# Patient Record
Sex: FEMALE | Race: WHITE | Employment: OTHER | URBAN - METROPOLITAN AREA
[De-identification: names, ages, dates, MRNs, and addresses within clinical notes are randomized per-mention and may not be internally consistent; named-entity substitution may affect disease eponyms.]

---

## 2018-10-20 ENCOUNTER — APPOINTMENT (OUTPATIENT)
Dept: GENERAL RADIOLOGY | Age: 73
End: 2018-10-20
Payer: MEDICARE

## 2018-10-20 ENCOUNTER — HOSPITAL ENCOUNTER (EMERGENCY)
Age: 73
Discharge: HOME | End: 2018-10-20
Attending: EMERGENCY MEDICINE
Payer: MEDICARE

## 2018-10-20 VITALS
OXYGEN SATURATION: 96 % | WEIGHT: 120 LBS | TEMPERATURE: 98 F | BODY MASS INDEX: 18.19 KG/M2 | HEIGHT: 68 IN | RESPIRATION RATE: 16 BRPM | SYSTOLIC BLOOD PRESSURE: 148 MMHG | HEART RATE: 45 BPM | DIASTOLIC BLOOD PRESSURE: 54 MMHG

## 2018-10-20 DIAGNOSIS — M54.10 RADICULAR LOW BACK PAIN: ICD-10-CM

## 2018-10-20 DIAGNOSIS — M25.552 LEFT HIP PAIN: Primary | ICD-10-CM

## 2018-10-20 PROCEDURE — 73502 X-RAY EXAM HIP UNI 2-3 VIEWS: CPT

## 2018-10-20 PROCEDURE — 6370000000 HC RX 637 (ALT 250 FOR IP): Performed by: EMERGENCY MEDICINE

## 2018-10-20 PROCEDURE — 72100 X-RAY EXAM L-S SPINE 2/3 VWS: CPT

## 2018-10-20 PROCEDURE — 99283 EMERGENCY DEPT VISIT LOW MDM: CPT

## 2018-10-20 PROCEDURE — 96372 THER/PROPH/DIAG INJ SC/IM: CPT

## 2018-10-20 PROCEDURE — 6360000002 HC RX W HCPCS: Performed by: EMERGENCY MEDICINE

## 2018-10-20 RX ORDER — OXYCODONE AND ACETAMINOPHEN 10; 325 MG/1; MG/1
1 TABLET ORAL EVERY 8 HOURS PRN
Qty: 18 TABLET | Refills: 0 | Status: SHIPPED | OUTPATIENT
Start: 2018-10-20 | End: 2018-10-30

## 2018-10-20 RX ORDER — OXYCODONE AND ACETAMINOPHEN 10; 325 MG/1; MG/1
1 TABLET ORAL ONCE
Status: COMPLETED | OUTPATIENT
Start: 2018-10-20 | End: 2018-10-20

## 2018-10-20 RX ORDER — DEXAMETHASONE SODIUM PHOSPHATE 10 MG/ML
10 INJECTION, SOLUTION INTRAMUSCULAR; INTRAVENOUS ONCE
Status: COMPLETED | OUTPATIENT
Start: 2018-10-20 | End: 2018-10-20

## 2018-10-20 RX ORDER — CYCLOBENZAPRINE HCL 10 MG
10 TABLET ORAL 3 TIMES DAILY PRN
Qty: 20 TABLET | Refills: 0 | Status: SHIPPED | OUTPATIENT
Start: 2018-10-20 | End: 2018-10-30

## 2018-10-20 RX ORDER — ORPHENADRINE CITRATE 30 MG/ML
60 INJECTION INTRAMUSCULAR; INTRAVENOUS ONCE
Status: COMPLETED | OUTPATIENT
Start: 2018-10-20 | End: 2018-10-20

## 2018-10-20 RX ORDER — PREDNISONE 20 MG/1
TABLET ORAL
Qty: 10 TABLET | Refills: 0 | Status: SHIPPED | OUTPATIENT
Start: 2018-10-20 | End: 2018-10-30

## 2018-10-20 RX ADMIN — ORPHENADRINE CITRATE 60 MG: 30 INJECTION INTRAMUSCULAR; INTRAVENOUS at 07:56

## 2018-10-20 RX ADMIN — DEXAMETHASONE SODIUM PHOSPHATE 10 MG: 10 INJECTION INTRAMUSCULAR; INTRAVENOUS at 07:56

## 2018-10-20 RX ADMIN — OXYCODONE HYDROCHLORIDE AND ACETAMINOPHEN 1 TABLET: 10; 325 TABLET ORAL at 09:45

## 2018-10-20 RX ADMIN — HYDROMORPHONE HYDROCHLORIDE 1 MG: 1 INJECTION, SOLUTION INTRAMUSCULAR; INTRAVENOUS; SUBCUTANEOUS at 07:56

## 2018-10-20 RX ADMIN — OXYCODONE HYDROCHLORIDE AND ACETAMINOPHEN 1 TABLET: 10; 325 TABLET ORAL at 11:24

## 2018-10-20 ASSESSMENT — PAIN DESCRIPTION - ORIENTATION
ORIENTATION: LEFT
ORIENTATION: LEFT

## 2018-10-20 ASSESSMENT — PAIN SCALES - GENERAL
PAINLEVEL_OUTOF10: 10
PAINLEVEL_OUTOF10: 4
PAINLEVEL_OUTOF10: 9
PAINLEVEL_OUTOF10: 4
PAINLEVEL_OUTOF10: 10

## 2018-10-20 ASSESSMENT — ENCOUNTER SYMPTOMS
VOMITING: 0
DIARRHEA: 0
COUGH: 0
EYE REDNESS: 0
EYE DISCHARGE: 0
ABDOMINAL DISTENTION: 0
SORE THROAT: 0
SHORTNESS OF BREATH: 0
SINUS PRESSURE: 0
BACK PAIN: 0
EYE PAIN: 0
WHEEZING: 0
NAUSEA: 0

## 2018-10-20 ASSESSMENT — PAIN DESCRIPTION - LOCATION
LOCATION: HIP
LOCATION: HIP

## 2018-10-20 ASSESSMENT — PAIN DESCRIPTION - PAIN TYPE: TYPE: CHRONIC PAIN

## 2018-10-20 NOTE — ED NOTES
Discharge instructions given, medications and follow up instructions reviewed. Patient verbalized understanding, no other noted or stated problems at this time. Patient will follow up with physicians as directed.       Martina Steel RN  10/20/18 [id number]

## 2018-10-20 NOTE — ED PROVIDER NOTES
Patient with long history of sciatic-type symptoms. The last couple weeks, she's had more frequent symptoms on the left but, she states that she is normally able to [facility] it off\" it is not that bad. This morning, she is awake and early morning with severe left-sided symptoms. No recent trauma. The history is provided by the patient and the spouse. Leg Injury   Location:  Hip  Time since incident:  2 weeks  Injury: no    Hip location:  L hip  Pain details:     Quality:  Shooting    Radiates to: Let flank. Severity:  Severe    Onset quality:  Gradual    Duration:  2 weeks    Timing:  Constant    Progression:  Waxing and waning  Chronicity:  Chronic  Dislocation: no    Relieved by: position. Exacerbated by: movement. Ineffective treatments:  None tried  Associated symptoms: no back pain and no fever        Review of Systems   Constitutional: Negative for chills and fever. HENT: Negative for ear pain, sinus pressure and sore throat. Eyes: Negative for pain, discharge and redness. Respiratory: Negative for cough, shortness of breath and wheezing. Cardiovascular: Negative for chest pain. Gastrointestinal: Negative for abdominal distention, diarrhea, nausea and vomiting. Genitourinary: Negative for dysuria and frequency. Musculoskeletal: Positive for arthralgias. Negative for back pain. Skin: Negative for rash and wound. Neurological: Negative for weakness and headaches. Hematological: Negative for adenopathy. All other systems reviewed and are negative. Physical Exam   Constitutional: She is oriented to person, place, and time. She appears well-developed and well-nourished. HENT:   Head: Normocephalic and atraumatic. Eyes: Pupils are equal, round, and reactive to light. Neck: Normal range of motion. Neck supple. Cardiovascular: Normal rate, regular rhythm and normal heart sounds. No murmur heard.   Pulmonary/Chest: Effort normal and breath sounds normal. No respiratory allergies. -------------------------------------------------- RESULTS -------------------------------------------------  Labs:  No results found for this visit on 10/20/18. Radiology:  XR HIP LEFT (2-3 VIEWS)   Final Result   No acute osseous abnormality. XR LUMBAR SPINE (2-3 VIEWS)   Final Result   Nonspecific straightening of the lumbar lordosis with mild lumbar   spondylosis          ------------------------- NURSING NOTES AND VITALS REVIEWED ---------------------------  Date / Time Roomed:  10/20/2018  7:20 AM  ED Bed Assignment:  13/13    The nursing notes within the ED encounter and vital signs as below have been reviewed. BP (!) 148/54   Pulse (!) 45   Temp 98 °F (36.7 °C) (Oral)   Resp 16   Ht 5' 8\" (1.727 m)   Wt 120 lb (54.4 kg)   SpO2 96%   BMI 18.25 kg/m²   Oxygen Saturation Interpretation: Normal      ------------------------------------------ PROGRESS NOTES ------------------------------------------  11:22 AM  I have spoken with the patient and discussed todays results, in addition to providing specific details for the plan of care and counseling regarding the diagnosis and prognosis. Their questions are answered at this time and they are agreeable with the plan. I discussed at length with them reasons for immediate return here for re evaluation. They will followup with their primary care physician by calling their office on Monday.      --------------------------------- ADDITIONAL PROVIDER NOTES ---------------------------------  At this time the patient is without objective evidence of an acute process requiring hospitalization or inpatient management. They have remained hemodynamically stable throughout their entire ED visit and are stable for discharge with outpatient follow-up. The plan has been discussed in detail and they are aware of the specific conditions for emergent return, as well as the importance of follow-up.       New Prescriptions    CYCLOBENZAPRINE

## 2019-12-18 ENCOUNTER — HOSPITAL ENCOUNTER (OUTPATIENT)
Dept: GENERAL RADIOLOGY | Age: 74
Discharge: HOME | End: 2019-12-20
Payer: MEDICARE

## 2019-12-18 DIAGNOSIS — Z12.31 VISIT FOR SCREENING MAMMOGRAM: ICD-10-CM

## 2019-12-18 PROCEDURE — 77063 BREAST TOMOSYNTHESIS BI: CPT

## 2021-02-01 ENCOUNTER — IMMUNIZATION (OUTPATIENT)
Dept: PRIMARY CARE CLINIC | Age: 76
End: 2021-02-01
Payer: MEDICARE

## 2021-02-01 DIAGNOSIS — Z23 NEED FOR VACCINATION: Primary | ICD-10-CM

## 2021-02-01 PROCEDURE — 0001A COVID-19, PFIZER VACCINE 30MCG/0.3ML DOSE: CPT | Performed by: PHYSICIAN ASSISTANT

## 2021-02-01 PROCEDURE — 91300 COVID-19, PFIZER VACCINE 30MCG/0.3ML DOSE: CPT | Performed by: PHYSICIAN ASSISTANT

## 2021-02-22 ENCOUNTER — IMMUNIZATION (OUTPATIENT)
Dept: PRIMARY CARE CLINIC | Age: 76
End: 2021-02-22
Payer: MEDICARE

## 2021-02-22 PROCEDURE — 0002A COVID-19, PFIZER VACCINE 30MCG/0.3ML DOSE: CPT | Performed by: NURSE PRACTITIONER

## 2021-02-22 PROCEDURE — 91300 COVID-19, PFIZER VACCINE 30MCG/0.3ML DOSE: CPT | Performed by: NURSE PRACTITIONER

## 2021-09-27 ENCOUNTER — IMMUNIZATION (OUTPATIENT)
Dept: PRIMARY CARE CLINIC | Age: 76
End: 2021-09-27
Payer: MEDICARE

## 2021-09-27 PROCEDURE — 0003A COVID-19, PFIZER VACCINE 30MCG/0.3ML DOSE: CPT | Performed by: FAMILY MEDICINE

## 2021-09-27 PROCEDURE — 91300 COVID-19, PFIZER VACCINE 30MCG/0.3ML DOSE: CPT | Performed by: FAMILY MEDICINE

## 2022-01-31 ENCOUNTER — HOSPITAL ENCOUNTER (EMERGENCY)
Age: 77
Discharge: HOME | End: 2022-01-31
Payer: COMMERCIAL

## 2022-01-31 VITALS
SYSTOLIC BLOOD PRESSURE: 109 MMHG | OXYGEN SATURATION: 95 % | TEMPERATURE: 98.9 F | HEIGHT: 68 IN | HEART RATE: 109 BPM | BODY MASS INDEX: 18.94 KG/M2 | RESPIRATION RATE: 20 BRPM | WEIGHT: 125 LBS | DIASTOLIC BLOOD PRESSURE: 67 MMHG

## 2022-01-31 DIAGNOSIS — B34.9 VIRAL SYNDROME: Primary | ICD-10-CM

## 2022-01-31 LAB — SARS-COV-2, NAAT: NOT DETECTED

## 2022-01-31 PROCEDURE — 87635 SARS-COV-2 COVID-19 AMP PRB: CPT

## 2022-01-31 PROCEDURE — 99212 OFFICE O/P EST SF 10 MIN: CPT

## 2022-01-31 NOTE — ED PROVIDER NOTES
[facility]  Department of Emergency Medicine  UC Encounter Note  22   9:22 AM EST      NAME: Tez Barros  :  1945  MRN:  [id number]    Chief Complaint: Headache (started today with chills achey  tired), Fatigue, and Concern For COVID-19      This is a 65-year-old female the presents to urgent care complaining of some body aches chills achy feels tired fatigue and some mild URI symptoms and a headache since last night. She did take some over-the-counter medications and the headache is gone away. She denies any chest pain or shortness of breath. At this time. Has been vaccinated for Covid including the booster but wants to have a Covid test.  She denies abdominal pain nausea vomiting diarrhea or urinary symptoms. On first contact patient she appears to be in no acute distress. Review of Systems  Pertinent positives and negatives are stated within HPI, all other systems reviewed and are negative. Physical Exam  Vitals and nursing note reviewed. Constitutional:       Appearance: She is well-developed. HENT:      Head: Normocephalic and atraumatic. Jaw: There is normal jaw occlusion. Right Ear: Hearing, tympanic membrane, ear canal and external ear normal.      Left Ear: Hearing, tympanic membrane, ear canal and external ear normal.      Nose: Nose normal.      Right Sinus: No maxillary sinus tenderness or frontal sinus tenderness. Left Sinus: No maxillary sinus tenderness or frontal sinus tenderness. Mouth/Throat:      Mouth: Mucous membranes are moist. No angioedema. Pharynx: Oropharynx is clear. Uvula midline. Eyes:      General: Lids are normal.      Conjunctiva/sclera: Conjunctivae normal.      Pupils: Pupils are equal, round, and reactive to light. Cardiovascular:      Rate and Rhythm: Normal rate and regular rhythm. Heart sounds: Normal heart sounds. No murmur heard.       Pulmonary:      Effort: Pulmonary effort is normal.      Breath sounds: Normal breath sounds. Abdominal:      General: Bowel sounds are normal.      Palpations: Abdomen is soft. Abdomen is not rigid. Tenderness: There is no abdominal tenderness. There is no guarding or rebound. Musculoskeletal:      Cervical back: Normal range of motion and neck supple. Skin:     General: Skin is warm and dry. Findings: No abrasion or rash. Neurological:      General: No focal deficit present. Mental Status: She is alert and oriented to person, place, and time. GCS: GCS eye subscore is 4. GCS verbal subscore is 5. GCS motor subscore is 6. Cranial Nerves: No cranial nerve deficit. Sensory: No sensory deficit. Coordination: Coordination normal.      Gait: Gait normal.         Procedures    MDM  Number of Diagnoses or Management Options  Viral syndrome  Diagnosis management comments: Patient is in no acute distress. Covid test was negative. She may just have a mild viral syndrome I did recommend taking over-the-counter cough and cold medications Tylenol ibuprofen for any aches or pains or fever. Come back if symptoms worsen. --------------------------------------------- PAST HISTORY ---------------------------------------------  Past Medical History:  has a past medical history of Thyroid disease. Past Surgical History:  has no past surgical history on file. Social History:  reports that she has quit smoking. She has never used smokeless tobacco. She reports current alcohol use. Family History: family history is not on file. The patients home medications have been reviewed. Allergies: Patient has no known allergies.     -------------------------------------------------- RESULTS -------------------------------------------------  Results for orders placed or performed during the hospital encounter of 01/31/22   COVID-19, Rapid    Specimen: Nasopharyngeal Swab   Result Value Ref Range    SARS-CoV-2, NAAT Not Detected Not Detected     No orders to display       ------------------------- NURSING NOTES AND VITALS REVIEWED ---------------------------   The nursing notes within the ED encounter and vital signs as below have been reviewed. /67   Pulse 109   Temp 98.9 °F (37.2 °C) (Infrared)   Resp 20   Ht 5' 8\" (1.727 m)   Wt 125 lb (56.7 kg)   SpO2 95%   BMI 19.01 kg/m²   Oxygen Saturation Interpretation: Normal      ------------------------------------------ PROGRESS NOTES ------------------------------------------   I have spoken with the patient and discussed todays results, in addition to providing specific details for the plan of care and counseling regarding the diagnosis and prognosis. Their questions are answered at this time and they are agreeable with the plan.      --------------------------------- ADDITIONAL PROVIDER NOTES ---------------------------------     This patient is stable for discharge. I have shared the specific conditions for return, as well as the importance of follow-up. * NOTE: This report was transcribed using voice recognition software. Every effort was made to ensure accuracy; however, inadvertent computerized transcription errors may be present.    --------------------------------- IMPRESSION AND DISPOSITION ---------------------------------    IMPRESSION  1.  Viral syndrome        DISPOSITION  Disposition: Discharge to home  Patient condition is good       aVne Daigle PA-C  01/31/22 4112

## 2022-04-05 ENCOUNTER — HOSPITAL ENCOUNTER (OUTPATIENT)
Dept: GENERAL RADIOLOGY | Age: 77
Discharge: HOME | End: 2022-04-07
Payer: COMMERCIAL

## 2022-04-05 DIAGNOSIS — Z12.31 VISIT FOR SCREENING MAMMOGRAM: ICD-10-CM

## 2022-04-05 PROCEDURE — 77063 BREAST TOMOSYNTHESIS BI: CPT

## 2022-04-25 ENCOUNTER — APPOINTMENT (OUTPATIENT)
Dept: GENERAL RADIOLOGY | Age: 77
DRG: 065 | End: 2022-04-25
Payer: MEDICARE

## 2022-04-25 ENCOUNTER — HOSPITAL ENCOUNTER (INPATIENT)
Age: 77
LOS: 2 days | Discharge: HOME | DRG: 065 | End: 2022-04-27
Attending: EMERGENCY MEDICINE | Admitting: INTERNAL MEDICINE
Payer: MEDICARE

## 2022-04-25 ENCOUNTER — APPOINTMENT (OUTPATIENT)
Dept: MRI IMAGING | Age: 77
DRG: 065 | End: 2022-04-25
Payer: MEDICARE

## 2022-04-25 ENCOUNTER — HOSPITAL ENCOUNTER (EMERGENCY)
Age: 77
Discharge: ANOTHER HOSPITAL | DRG: 065 | End: 2022-04-25
Payer: MEDICARE

## 2022-04-25 ENCOUNTER — APPOINTMENT (OUTPATIENT)
Dept: CT IMAGING | Age: 77
DRG: 065 | End: 2022-04-25
Payer: MEDICARE

## 2022-04-25 VITALS
OXYGEN SATURATION: 96 % | SYSTOLIC BLOOD PRESSURE: 113 MMHG | DIASTOLIC BLOOD PRESSURE: 71 MMHG | TEMPERATURE: 97.2 F | HEIGHT: 68 IN | HEART RATE: 94 BPM | BODY MASS INDEX: 18.79 KG/M2 | WEIGHT: 124 LBS | RESPIRATION RATE: 18 BRPM

## 2022-04-25 DIAGNOSIS — I63.9 CEREBROVASCULAR ACCIDENT (CVA), UNSPECIFIED MECHANISM (HCC): Primary | ICD-10-CM

## 2022-04-25 DIAGNOSIS — I49.9 IRREGULAR HEART BEAT: ICD-10-CM

## 2022-04-25 DIAGNOSIS — I48.91 NEW ONSET ATRIAL FIBRILLATION (HCC): ICD-10-CM

## 2022-04-25 DIAGNOSIS — R51.9 NONINTRACTABLE HEADACHE, UNSPECIFIED CHRONICITY PATTERN, UNSPECIFIED HEADACHE TYPE: Primary | ICD-10-CM

## 2022-04-25 PROBLEM — I63.411 EMBOLIC STROKE INVOLVING RIGHT MIDDLE CEREBRAL ARTERY (HCC): Status: ACTIVE | Noted: 2022-04-25

## 2022-04-25 PROBLEM — E03.9 HYPOTHYROIDISM: Status: ACTIVE | Noted: 2022-04-25

## 2022-04-25 LAB
ALBUMIN SERPL-MCNC: 4.2 G/DL (ref 3.5–5.2)
ALP BLD-CCNC: 90 U/L (ref 35–104)
ALT SERPL-CCNC: 16 U/L (ref 0–32)
ANION GAP SERPL CALCULATED.3IONS-SCNC: 12 MMOL/L (ref 7–16)
AST SERPL-CCNC: 23 U/L (ref 0–31)
BASOPHILS ABSOLUTE: 0.03 E9/L (ref 0–0.2)
BASOPHILS RELATIVE PERCENT: 0.5 % (ref 0–2)
BILIRUB SERPL-MCNC: 1 MG/DL (ref 0–1.2)
BUN BLDV-MCNC: 16 MG/DL (ref 6–23)
CALCIUM SERPL-MCNC: 9.5 MG/DL (ref 8.6–10.2)
CHLORIDE BLD-SCNC: 103 MMOL/L (ref 98–107)
CO2: 24 MMOL/L (ref 22–29)
CREAT SERPL-MCNC: 0.9 MG/DL (ref 0.5–1)
EKG ATRIAL RATE: 119 BPM
EKG Q-T INTERVAL: 332 MS
EKG QRS DURATION: 112 MS
EKG QTC CALCULATION (BAZETT): 444 MS
EKG R AXIS: -45 DEGREES
EKG T AXIS: 78 DEGREES
EKG VENTRICULAR RATE: 108 BPM
EOSINOPHILS ABSOLUTE: 0.06 E9/L (ref 0.05–0.5)
EOSINOPHILS RELATIVE PERCENT: 1.1 % (ref 0–6)
GFR AFRICAN AMERICAN: >60
GFR NON-AFRICAN AMERICAN: >60 ML/MIN/1.73
GLUCOSE BLD-MCNC: 117 MG/DL (ref 74–99)
HCT VFR BLD CALC: 44 % (ref 34–48)
HEMOGLOBIN: 14.5 G/DL (ref 11.5–15.5)
IMMATURE GRANULOCYTES #: 0.01 E9/L
IMMATURE GRANULOCYTES %: 0.2 % (ref 0–5)
LYMPHOCYTES ABSOLUTE: 1.64 E9/L (ref 1.5–4)
LYMPHOCYTES RELATIVE PERCENT: 29.6 % (ref 20–42)
MCH RBC QN AUTO: 31.5 PG (ref 26–35)
MCHC RBC AUTO-ENTMCNC: 33 % (ref 32–34.5)
MCV RBC AUTO: 95.4 FL (ref 80–99.9)
MONOCYTES ABSOLUTE: 0.49 E9/L (ref 0.1–0.95)
MONOCYTES RELATIVE PERCENT: 8.8 % (ref 2–12)
NEUTROPHILS ABSOLUTE: 3.31 E9/L (ref 1.8–7.3)
NEUTROPHILS RELATIVE PERCENT: 59.8 % (ref 43–80)
PDW BLD-RTO: 12.9 FL (ref 11.5–15)
PLATELET # BLD: 246 E9/L (ref 130–450)
PMV BLD AUTO: 9.5 FL (ref 7–12)
POTASSIUM REFLEX MAGNESIUM: 4.4 MMOL/L (ref 3.5–5)
PRO-BNP: 2341 PG/ML (ref 0–450)
RBC # BLD: 4.61 E12/L (ref 3.5–5.5)
SODIUM BLD-SCNC: 139 MMOL/L (ref 132–146)
T4 FREE: 1.8 NG/DL (ref 0.93–1.7)
T4 TOTAL: 10.2 MCG/DL (ref 4.5–11.7)
TOTAL PROTEIN: 7.5 G/DL (ref 6.4–8.3)
TROPONIN, HIGH SENSITIVITY: 11 NG/L (ref 0–9)
TROPONIN, HIGH SENSITIVITY: 11 NG/L (ref 0–9)
TSH SERPL DL<=0.05 MIU/L-ACNC: 0.26 UIU/ML (ref 0.27–4.2)
WBC # BLD: 5.5 E9/L (ref 4.5–11.5)

## 2022-04-25 PROCEDURE — 85025 COMPLETE CBC W/AUTO DIFF WBC: CPT

## 2022-04-25 PROCEDURE — 99211 OFF/OP EST MAY X REQ PHY/QHP: CPT

## 2022-04-25 PROCEDURE — 84436 ASSAY OF TOTAL THYROXINE: CPT

## 2022-04-25 PROCEDURE — 96374 THER/PROPH/DIAG INJ IV PUSH: CPT

## 2022-04-25 PROCEDURE — 2580000003 HC RX 258: Performed by: INTERNAL MEDICINE

## 2022-04-25 PROCEDURE — 6370000000 HC RX 637 (ALT 250 FOR IP): Performed by: INTERNAL MEDICINE

## 2022-04-25 PROCEDURE — 71046 X-RAY EXAM CHEST 2 VIEWS: CPT

## 2022-04-25 PROCEDURE — 36415 COLL VENOUS BLD VENIPUNCTURE: CPT

## 2022-04-25 PROCEDURE — 2060000000 HC ICU INTERMEDIATE R&B

## 2022-04-25 PROCEDURE — 84443 ASSAY THYROID STIM HORMONE: CPT

## 2022-04-25 PROCEDURE — 83880 ASSAY OF NATRIURETIC PEPTIDE: CPT

## 2022-04-25 PROCEDURE — 84484 ASSAY OF TROPONIN QUANT: CPT

## 2022-04-25 PROCEDURE — 99222 1ST HOSP IP/OBS MODERATE 55: CPT | Performed by: INTERNAL MEDICINE

## 2022-04-25 PROCEDURE — 84439 ASSAY OF FREE THYROXINE: CPT

## 2022-04-25 PROCEDURE — 80053 COMPREHEN METABOLIC PANEL: CPT

## 2022-04-25 PROCEDURE — 70450 CT HEAD/BRAIN W/O DYE: CPT

## 2022-04-25 PROCEDURE — 99285 EMERGENCY DEPT VISIT HI MDM: CPT

## 2022-04-25 PROCEDURE — 70553 MRI BRAIN STEM W/O & W/DYE: CPT

## 2022-04-25 PROCEDURE — 6360000002 HC RX W HCPCS: Performed by: EMERGENCY MEDICINE

## 2022-04-25 PROCEDURE — 6360000002 HC RX W HCPCS: Performed by: INTERNAL MEDICINE

## 2022-04-25 PROCEDURE — 93005 ELECTROCARDIOGRAM TRACING: CPT

## 2022-04-25 PROCEDURE — 6370000000 HC RX 637 (ALT 250 FOR IP)

## 2022-04-25 PROCEDURE — A9577 INJ MULTIHANCE: HCPCS | Performed by: RADIOLOGY

## 2022-04-25 PROCEDURE — 6360000004 HC RX CONTRAST MEDICATION: Performed by: RADIOLOGY

## 2022-04-25 RX ORDER — SODIUM CHLORIDE 0.9 % (FLUSH) 0.9 %
5-40 SYRINGE (ML) INJECTION PRN
Status: DISCONTINUED | OUTPATIENT
Start: 2022-04-25 | End: 2022-04-27 | Stop reason: HOSPADM

## 2022-04-25 RX ORDER — DIAZEPAM 5 MG/ML
5 INJECTION, SOLUTION INTRAMUSCULAR; INTRAVENOUS ONCE
Status: COMPLETED | OUTPATIENT
Start: 2022-04-25 | End: 2022-04-25

## 2022-04-25 RX ORDER — ONDANSETRON 4 MG/1
4 TABLET, ORALLY DISINTEGRATING ORAL EVERY 8 HOURS PRN
Status: DISCONTINUED | OUTPATIENT
Start: 2022-04-25 | End: 2022-04-27 | Stop reason: HOSPADM

## 2022-04-25 RX ORDER — SODIUM CHLORIDE 0.9 % (FLUSH) 0.9 %
5-40 SYRINGE (ML) INJECTION EVERY 12 HOURS SCHEDULED
Status: DISCONTINUED | OUTPATIENT
Start: 2022-04-25 | End: 2022-04-27 | Stop reason: HOSPADM

## 2022-04-25 RX ORDER — SODIUM CHLORIDE 9 MG/ML
25 INJECTION, SOLUTION INTRAVENOUS PRN
Status: DISCONTINUED | OUTPATIENT
Start: 2022-04-25 | End: 2022-04-27 | Stop reason: HOSPADM

## 2022-04-25 RX ORDER — M-VIT,TX,IRON,MINS/CALC/FOLIC 27MG-0.4MG
1 TABLET ORAL DAILY
COMMUNITY

## 2022-04-25 RX ORDER — ACETAMINOPHEN 325 MG/1
650 TABLET ORAL EVERY 6 HOURS PRN
Status: DISCONTINUED | OUTPATIENT
Start: 2022-04-25 | End: 2022-04-27 | Stop reason: HOSPADM

## 2022-04-25 RX ORDER — DIAZEPAM 5 MG/ML
5 INJECTION, SOLUTION INTRAMUSCULAR; INTRAVENOUS
Status: ACTIVE | OUTPATIENT
Start: 2022-04-25 | End: 2022-04-25

## 2022-04-25 RX ORDER — ENOXAPARIN SODIUM 100 MG/ML
40 INJECTION SUBCUTANEOUS DAILY
Status: DISCONTINUED | OUTPATIENT
Start: 2022-04-25 | End: 2022-04-27

## 2022-04-25 RX ORDER — ASPIRIN 81 MG/1
81 TABLET, CHEWABLE ORAL DAILY
Status: DISCONTINUED | OUTPATIENT
Start: 2022-04-25 | End: 2022-04-27 | Stop reason: HOSPADM

## 2022-04-25 RX ORDER — ONDANSETRON 2 MG/ML
4 INJECTION INTRAMUSCULAR; INTRAVENOUS EVERY 6 HOURS PRN
Status: DISCONTINUED | OUTPATIENT
Start: 2022-04-25 | End: 2022-04-27 | Stop reason: HOSPADM

## 2022-04-25 RX ORDER — POLYETHYLENE GLYCOL 3350 17 G/17G
17 POWDER, FOR SOLUTION ORAL DAILY PRN
Status: DISCONTINUED | OUTPATIENT
Start: 2022-04-25 | End: 2022-04-27 | Stop reason: HOSPADM

## 2022-04-25 RX ORDER — ACETAMINOPHEN 650 MG/1
650 SUPPOSITORY RECTAL EVERY 6 HOURS PRN
Status: DISCONTINUED | OUTPATIENT
Start: 2022-04-25 | End: 2022-04-27 | Stop reason: HOSPADM

## 2022-04-25 RX ORDER — ACETAMINOPHEN 160 MG
TABLET,DISINTEGRATING ORAL
COMMUNITY

## 2022-04-25 RX ORDER — SODIUM CHLORIDE 9 MG/ML
INJECTION, SOLUTION INTRAVENOUS CONTINUOUS
Status: DISCONTINUED | OUTPATIENT
Start: 2022-04-25 | End: 2022-04-26

## 2022-04-25 RX ADMIN — METOPROLOL TARTRATE 25 MG: 25 TABLET, FILM COATED ORAL at 11:56

## 2022-04-25 RX ADMIN — GADOBENATE DIMEGLUMINE 11 ML: 529 INJECTION, SOLUTION INTRAVENOUS at 13:21

## 2022-04-25 RX ADMIN — SODIUM CHLORIDE: 9 INJECTION, SOLUTION INTRAVENOUS at 16:52

## 2022-04-25 RX ADMIN — ASPIRIN 81 MG 81 MG: 81 TABLET ORAL at 16:52

## 2022-04-25 RX ADMIN — Medication 10 ML: at 23:01

## 2022-04-25 RX ADMIN — ENOXAPARIN SODIUM 40 MG: 100 INJECTION SUBCUTANEOUS at 16:52

## 2022-04-25 RX ADMIN — DIAZEPAM 5 MG: 10 INJECTION, SOLUTION INTRAMUSCULAR; INTRAVENOUS at 12:42

## 2022-04-25 RX ADMIN — SODIUM CHLORIDE: 9 INJECTION, SOLUTION INTRAVENOUS at 15:26

## 2022-04-25 ASSESSMENT — ENCOUNTER SYMPTOMS
DIARRHEA: 0
ABDOMINAL PAIN: 0
NAUSEA: 0
VOMITING: 0
SHORTNESS OF BREATH: 0
COUGH: 0
VOICE CHANGE: 0
WHEEZING: 0
BACK PAIN: 0
PHOTOPHOBIA: 0
TROUBLE SWALLOWING: 0

## 2022-04-25 ASSESSMENT — PAIN DESCRIPTION - DESCRIPTORS
DESCRIPTORS: DISCOMFORT
DESCRIPTORS: ACHING

## 2022-04-25 ASSESSMENT — PAIN SCALES - GENERAL
PAINLEVEL_OUTOF10: 5
PAINLEVEL_OUTOF10: 5
PAINLEVEL_OUTOF10: 0
PAINLEVEL_OUTOF10: 4

## 2022-04-25 ASSESSMENT — PAIN - FUNCTIONAL ASSESSMENT
PAIN_FUNCTIONAL_ASSESSMENT: 0-10

## 2022-04-25 ASSESSMENT — PAIN DESCRIPTION - LOCATION
LOCATION: HEAD

## 2022-04-25 ASSESSMENT — PAIN DESCRIPTION - ONSET: ONSET: GRADUAL

## 2022-04-25 ASSESSMENT — PAIN DESCRIPTION - DIRECTION: RADIATING_TOWARDS: FOREHEAD

## 2022-04-25 ASSESSMENT — PAIN DESCRIPTION - ORIENTATION: ORIENTATION: RIGHT

## 2022-04-25 ASSESSMENT — PAIN DESCRIPTION - PAIN TYPE: TYPE: ACUTE PAIN

## 2022-04-25 NOTE — CARE COORDINATION
SS Note: No Covid testing. Pt is in ED and being admitted inpatient. Pt on room air. SW met with pt for initial assessment and transition of care, pt stated she resides alone, bedroom and bathroom on first floor, two steps to enter home. Stated was independent prior to admission including driving. Stated no past SNF, HHC, or DME. Stated her PCP is Dr. Anibal Don. Patient has prescription coverage, uses [address]. Pt plans to return home upon discharge, stated car is here and plans to drive self home, denies any needs including Clara Kelsey.   Electronically signed by MARISA Allred on 4/25/2022 at 3:07 PM

## 2022-04-25 NOTE — ED PROVIDER NOTES
65y.o. year old female presenting to the emergency room with concerns of headache beginning 4 days ago. Patient reports that symptom's onset 4 days prior. Worsen with nothing. Improves with Tylenol. Severity of 7 out of 10 pain, with no radiation. Symptoms are improving in timing. Symptoms described as pain behind her right ear. Patient reports associated symptoms of atrial fibrillation, noted to urgent care. Patient in no acute distress. Denies any chest pain or shortness of breath. Previous history of supraventricular tachycardia, but no known history of atrial fibrillation per patient. Chief Complaint   Patient presents with    Headache     behind right ear \"for days\"    Atrial Fibrillation     was at urgent care for headache and was told to come here because she is in a fib. denies chest pain and dyspnea. . she feels tired. Review of Systems   Constitutional: Negative for chills, fatigue and fever. HENT: Negative for trouble swallowing and voice change. Eyes: Negative for photophobia and visual disturbance. Respiratory: Negative for cough, shortness of breath and wheezing. Cardiovascular: Negative for chest pain and leg swelling. Gastrointestinal: Negative for abdominal pain, diarrhea, nausea and vomiting. Genitourinary: Negative for dysuria, flank pain, frequency and urgency. Musculoskeletal: Negative for arthralgias, back pain and neck pain. Skin: Negative for rash and wound. Neurological: Positive for headaches. Negative for dizziness, syncope, weakness and numbness. Psychiatric/Behavioral: Negative for behavioral problems and confusion. The patient is not nervous/anxious. Physical Exam  Vitals reviewed. Constitutional:       General: She is not in acute distress. Appearance: Normal appearance. HENT:      Head: Normocephalic.       Right Ear: External ear normal.      Left Ear: External ear normal.      Nose: Nose normal.      Mouth/Throat:      Mouth: Mucous membranes are moist.   Eyes:      General:         Right eye: No discharge. Left eye: No discharge. Conjunctiva/sclera: Conjunctivae normal.      Pupils: Pupils are equal, round, and reactive to light. Cardiovascular:      Rate and Rhythm: Tachycardia present. Rhythm irregular. Heart sounds: No friction rub. No gallop. Pulmonary:      Effort: Pulmonary effort is normal. No respiratory distress. Breath sounds: No stridor. No wheezing, rhonchi or rales. Abdominal:      General: There is no distension. Palpations: Abdomen is soft. Tenderness: There is no abdominal tenderness. There is no right CVA tenderness, left CVA tenderness, guarding or rebound. Musculoskeletal:         General: No tenderness or deformity. Normal range of motion. Cervical back: Normal range of motion and neck supple. No rigidity or tenderness. Skin:     General: Skin is warm. Coloration: Skin is not jaundiced. Findings: No erythema. Neurological:      Mental Status: She is alert and oriented to person, place, and time. Sensory: No sensory deficit. Motor: No weakness. Psychiatric:         Mood and Affect: Mood normal.         Behavior: Behavior normal.          Procedures     EKG: This EKG is signed by emergency department physician. Rate: 102  Rhythm: Atrial fibrillation  AXIS:left  ST Changes:no St elevation noted  Interpretation: atrial fibrillation (new onset)  Comparison: no previous EKG available     MDM  Number of Diagnoses or Management Options  Cerebrovascular accident (CVA), unspecified mechanism ([address]  New onset atrial fibrillation [facility])  Diagnosis management comments: 79-year-old female presented emergency department complaints of headache x4 days. Patient sent in from urgent care after atrial fibrillation noted. Denies any previous history of atrial fibrillation. Denies any chest pain or trouble breathing. Headache has been improving.   Located behind right ear. No elevations in white blood cells. Chest x-ray with borderline cardiomegaly. CT head shows low-attenuation posterior right parietal lobe, MRI recommended. MRI brain showed acute ischemia in the right middle cerebral arterial territory. Consulted Dr. Eligio Smith, has agreed to consult on patient at [facility]. Spoke to Dr. Lavinia Harvey, will plan to admit the patient at this time. NIH remains 0. ED Course as of 04/25/22 1506   Mon Apr 25, 2022   1024 ATTENDING PROVIDER ATTESTATION:     I have personally performed and/or participated in the history, exam, medical decision making, and procedures and agree with all pertinent clinical information unless otherwise noted. I have also reviewed and agree with the past medical, family and social history unless otherwise noted. I have discussed this patient in detail with the resident, and provided the instruction and education regarding patient here for evaluation of atrial fibrillation. She went to the urgent care today for a mild frontal headache and was found to be in atrial fibrillation so they sent her here. She denies chest pain, palpitations or shortness of breath. Denies lightheadedness or syncope. Has only very mild frontal headache with no eye pain or vision changes. No fevers. No neck pain. No leg pain or swelling. No history of blood clots. No extremity numbness, tingling, paresthesias or weakness. .  My findings/plan: Patient laying the bed resting comfortably no distress. Heart rate regular with mild irregularity on rhythm. Lungs are clear and equal.  Abdomen soft and nontender. No adenopathy or meningeal signs. No thyromegaly or palpable masses. She has no pretibial edema or calf pain. No jaundice or icterus. No focal neurologic deficit. No temporal artery tenderness. [NC]   4706 Patient complaining claustrophobia, premedicated before MRI. [NC]   6936 NIH 0 at this time.  No neurological complaints at this time. [FITZ]      ED Course User Index  [FITZ] Yessy Krishnamurthy DO  [NC] Tierraleilani Hunt DO        ED Course as of 04/25/22 1506   Mon Apr 25, 2022   1024 ATTENDING PROVIDER ATTESTATION:     I have personally performed and/or participated in the history, exam, medical decision making, and procedures and agree with all pertinent clinical information unless otherwise noted. I have also reviewed and agree with the past medical, family and social history unless otherwise noted. I have discussed this patient in detail with the resident, and provided the instruction and education regarding patient here for evaluation of atrial fibrillation. She went to the urgent care today for a mild frontal headache and was found to be in atrial fibrillation so they sent her here. She denies chest pain, palpitations or shortness of breath. Denies lightheadedness or syncope. Has only very mild frontal headache with no eye pain or vision changes. No fevers. No neck pain. No leg pain or swelling. No history of blood clots. No extremity numbness, tingling, paresthesias or weakness. .  My findings/plan: Patient laying the bed resting comfortably no distress. Heart rate regular with mild irregularity on rhythm. Lungs are clear and equal.  Abdomen soft and nontender. No adenopathy or meningeal signs. No thyromegaly or palpable masses. She has no pretibial edema or calf pain. No jaundice or icterus. No focal neurologic deficit. No temporal artery tenderness. [NC]   3202 Patient complaining claustrophobia, premedicated before MRI. [NC]   7828 NIH 0 at this time. No neurological complaints at this time. [FITZ]      ED Course User Index  [FITZ] Yessy Krishnamurthy DO  [NC] Tierra Hunt DO       --------------------------------------------- PAST HISTORY ---------------------------------------------  Past Medical History:  has a past medical history of Thyroid disease.     Past Surgical History:  has a past surgical history that includes Breast enhancement surgery. Social History:  reports that she quit smoking about 30 years ago. Her smoking use included cigarettes. She has never used smokeless tobacco. She reports current alcohol use. Family History: family history is not on file. The patients home medications have been reviewed. Allergies: Patient has no known allergies.     -------------------------------------------------- RESULTS -------------------------------------------------    LABS:  Results for orders placed or performed during the hospital encounter of 04/25/22   CBC with Auto Differential   Result Value Ref Range    WBC 5.5 4.5 - 11.5 E9/L    RBC 4.61 3.50 - 5.50 E12/L    Hemoglobin 14.5 11.5 - 15.5 g/dL    Hematocrit 44.0 34.0 - 48.0 %    MCV 95.4 80.0 - 99.9 fL    MCH 31.5 26.0 - 35.0 pg    MCHC 33.0 32.0 - 34.5 %    RDW 12.9 11.5 - 15.0 fL    Platelets 768 618 - 356 E9/L    MPV 9.5 7.0 - 12.0 fL    Neutrophils % 59.8 43.0 - 80.0 %    Immature Granulocytes % 0.2 0.0 - 5.0 %    Lymphocytes % 29.6 20.0 - 42.0 %    Monocytes % 8.8 2.0 - 12.0 %    Eosinophils % 1.1 0.0 - 6.0 %    Basophils % 0.5 0.0 - 2.0 %    Neutrophils Absolute 3.31 1.80 - 7.30 E9/L    Immature Granulocytes # 0.01 E9/L    Lymphocytes Absolute 1.64 1.50 - 4.00 E9/L    Monocytes Absolute 0.49 0.10 - 0.95 E9/L    Eosinophils Absolute 0.06 0.05 - 0.50 E9/L    Basophils Absolute 0.03 0.00 - 0.20 E9/L   Comprehensive Metabolic Panel w/ Reflex to MG   Result Value Ref Range    Sodium 139 132 - 146 mmol/L    Potassium reflex Magnesium 4.4 3.5 - 5.0 mmol/L    Chloride 103 98 - 107 mmol/L    CO2 24 22 - 29 mmol/L    Anion Gap 12 7 - 16 mmol/L    Glucose 117 (H) 74 - 99 mg/dL    BUN 16 6 - 23 mg/dL    CREATININE 0.9 0.5 - 1.0 mg/dL    GFR Non-African American >60 >=60 mL/min/1.73    GFR African American >60     Calcium 9.5 8.6 - 10.2 mg/dL    Total Protein 7.5 6.4 - 8.3 g/dL    Albumin 4.2 3.5 - 5.2 g/dL    Total Bilirubin 1.0 0.0 - 1.2 mg/dL Alkaline Phosphatase 90 35 - 104 U/L    ALT 16 0 - 32 U/L    AST 23 0 - 31 U/L   Troponin   Result Value Ref Range    Troponin, High Sensitivity 11 (H) 0 - 9 ng/L   Brain Natriuretic Peptide   Result Value Ref Range    Pro-BNP 2,341 (H) 0 - 450 pg/mL   TSH   Result Value Ref Range    TSH 0.260 (L) 0.270 - 4.200 uIU/mL   T4   Result Value Ref Range    T4, Total 10.2 4.5 - 11.7 mcg/dL   Troponin   Result Value Ref Range    Troponin, High Sensitivity 11 (H) 0 - 9 ng/L   EKG 12 Lead   Result Value Ref Range    Ventricular Rate 108 BPM    Atrial Rate 119 BPM    QRS Duration 112 ms    Q-T Interval 332 ms    QTc Calculation (Bazett) 444 ms    R Axis -45 degrees    T Axis 78 degrees       RADIOLOGY:  MRI BRAIN W WO CONTRAST   Final Result   Acute ischemia in the right middle cerebral artery territory. CT Head WO Contrast   Final Result   1. 3.3 x 2.7 x 2.5 cm abnormal area of low attenuation within the posterior   right parietal lobe. This may represent a subacute infarct or possibly a   mass. Significant surrounding vasogenic edema is not appreciated. There is   no appreciable mass effect. Dedicated MRI within without IV contrast is   recommended for further evaluation   2. There is no intracranial hemorrhage. XR CHEST (2 VW)   Final Result   Borderline cardiomegaly and calcification at the mitral annulus. ------------------------- NURSING NOTES AND VITALS REVIEWED ---------------------------  Date / Time Roomed:  4/25/2022  9:49 AM  ED Bed Assignment:  01/01    The nursing notes within the ED encounter and vital signs as below have been reviewed.      Patient Vitals for the past 24 hrs:   BP Temp Temp src Pulse Resp SpO2 Height Weight   04/25/22 1424 93/70 -- -- 78 20 95 % -- --   04/25/22 1158 101/71 -- -- 86 20 95 % -- --   04/25/22 1156 101/71 -- -- 86 -- -- -- --   04/25/22 0952 (!) 133/98 98.2 °F (36.8 °C) Oral 112 20 95 % 5' 8\" (1.727 m) 124 lb (56.2 kg)   04/25/22 0849 (!) 153/76 96.6 °F (35.9 °C) Infrared 97 -- -- -- --       Oxygen Saturation Interpretation: Normal    ------------------------------------------ PROGRESS NOTES ------------------------------------------  Re-evaluation(s):  Time: 200  Patients symptoms show no change  Repeat physical examination is not changed    Counseling:  I have spoken with the patient and discussed todays results, in addition to providing specific details for the plan of care and counseling regarding the diagnosis and prognosis. Their questions are answered at this time and they are agreeable with the plan of admission.    --------------------------------- ADDITIONAL PROVIDER NOTES ---------------------------------  Consultations:  Time: 1430. Spoke with Dr. Daryn Baig. Discussed case. They will admit the patient. This patient's ED course included: a personal history and physicial examination, re-evaluation prior to disposition, multiple bedside re-evaluations, IV medications, cardiac monitoring and continuous pulse oximetry    This patient has remained hemodynamically stable during their ED course. Diagnosis:  1. Cerebrovascular accident (CVA), unspecified mechanism ([address]    2. New onset atrial fibrillation (HCC)        Disposition:  Patient's disposition: Admit  Patient's condition is stable. Attending was present and available throughout encounter including all critical portions;  See Attending Note/Attestation for Final Plan       Spring Garcia DO  Resident  04/25/22 4088

## 2022-04-25 NOTE — H&P
[address] [place], [place] [address]ist Group   History and Physical      CHIEF COMPLAINT: Headache    History of Present Illness:  65 y.o. female with a history of hypothyroidism and remote tobacco use presents with headache. On Saturday, she noticed a headache and pressure in the back of her head on the right. She took Aleve with some relief, but has noted she has been very sleepy over the last few days. No other provoking or palliating factors. Denies any numbness, weakness, difficulty with speech, difficulty with swallowing, nausea, vomiting, or heart palpitations. In the ED, vital signs significant for tachycardia and EKG showed atrial fibrillation. CT of the head without contrast showed a large area with low attenuation in the posterior right parietal lobe. Follow-up MRI confirmed acute ischemia in the right MCA territory. At this time, her NIHSS is 0 and her headache is nearly gone. Informant(s) for H&P: Patient, ED resident physician, chart review    REVIEW OF SYSTEMS:  no fevers, chills, cp, sob, n/v, ha, vision/hearing changes, wt changes, hot/cold flashes, other open skin lesions, diarrhea, constipation, dysuria/hematuria unless noted in HPI. Complete ROS performed with the patient and is otherwise negative. PMH:  Past Medical History:   Diagnosis Date    Atrial fibrillation ([address] 04/25/2022    hypothyroidism     Ischemic stroke ([facility] 04/25/2022       Surgical History:  Past Surgical History:   Procedure Laterality Date    BREAST ENHANCEMENT SURGERY         Medications Prior to Admission:    Prior to Admission medications    Medication Sig Start Date End Date Taking? Authorizing Provider   Naproxen Sodium (ALEVE PO) Take by mouth    Historical Provider, MD   levothyroxine (SYNTHROID) 125 MCG tablet Take 125 mcg by mouth Daily    Historical Provider, MD       Allergies:    Patient has no known allergies. Social History:    reports that she quit smoking about 30 years ago.  Her smoking use included cigarettes. She has never used smokeless tobacco. She reports current alcohol use. Family History:   family history includes Atrial Fibrillation in her mother; Heart Failure in her brother; Pneumonia in her father; Stroke in her mother. PHYSICAL EXAM:  Vitals:  /86   Pulse 94   Temp 97.8 °F (36.6 °C) (Oral)   Resp 24   Ht 5' 8\" (1.727 m)   Wt 124 lb (56.2 kg)   SpO2 94%   BMI 18.85 kg/m²     General Appearance: alert and oriented to person, place and time and in no acute distress  Skin: warm and dry  Head: normocephalic and atraumatic  Eyes: pupils equal, round, and reactive to light, extraocular eye movements intact, conjunctivae normal  Neck: neck supple and non tender without mass   Pulmonary/Chest: clear to auscultation bilaterally- no wheezes, rales or rhonchi, normal air movement, no respiratory distress  Cardiovascular: Irregular rhythm, controlled rate, S1, S2. No carotid bruits  Abdomen: soft, non-tender, non-distended, normal bowel sounds, no masses or organomegaly  Extremities: no cyanosis, no clubbing and no edema  Neurologic: no cranial nerve deficit and speech normal    LABS:  Recent Labs     04/25/22  1010      K 4.4      CO2 24   BUN 16   CREATININE 0.9   GLUCOSE 117*   CALCIUM 9.5       Recent Labs     04/25/22  1010   WBC 5.5   RBC 4.61   HGB 14.5   HCT 44.0   MCV 95.4   MCH 31.5   MCHC 33.0   RDW 12.9      MPV 9.5       No results for input(s): POCGLU in the last 72 hours. TSH:    Lab Results   Component Value Date    TSH 0.260 04/25/2022       Radiology: XR CHEST (2 VW)    Result Date: 4/25/2022  EXAMINATION: TWO XRAY VIEWS OF THE CHEST 4/25/2022 9:34 am COMPARISON: None. HISTORY: ORDERING SYSTEM PROVIDED HISTORY: tachycardia TECHNOLOGIST PROVIDED HISTORY: Reason for exam:->tachycardia FINDINGS: Borderline cardiomegaly. Normal pulmonary vascularity. Lungs are clear. Neither costophrenic angle is blunted.   There is calcification of the mitral annulus. Borderline cardiomegaly and calcification at the mitral annulus. CT Head WO Contrast    Result Date: 4/25/2022  EXAMINATION: CT OF THE HEAD WITHOUT CONTRAST  4/25/2022 11:28 am TECHNIQUE: CT of the head was performed without the administration of intravenous contrast. Dose modulation, iterative reconstruction, and/or weight based adjustment of the mA/kV was utilized to reduce the radiation dose to as low as reasonably achievable. COMPARISON: None. HISTORY: ORDERING SYSTEM PROVIDED HISTORY: headache, atrial fibrillation TECHNOLOGIST PROVIDED HISTORY: Reason for exam:->headache, atrial fibrillation Has a \"code stroke\" or \"stroke alert\" been called? ->No Decision Support Exception - unselect if not a suspected or confirmed emergency medical condition->Emergency Medical Condition ([place]) FINDINGS: BRAIN/VENTRICLES: There is no acute intracranial hemorrhage. Within the posterior right parietal lobe there is a 3.3 x 2.7 x 2.5 cm low attenuated focus. Significant surrounding edema is not appreciated. There is no mass effect. There is no effacement of the right lateral ventricle. There is no midline shift. The left cerebral hemisphere is unremarkable. The brainstem is unremarkable. ORBITS: The visualized portion of the orbits demonstrate no acute abnormality. SINUSES: The visualized paranasal sinuses and mastoid air cells demonstrate no acute abnormality. There is complete opacification of the left maxillary sinus. SOFT TISSUES/SKULL:  No acute abnormality of the visualized skull or soft tissues. 1. 3.3 x 2.7 x 2.5 cm abnormal area of low attenuation within the posterior right parietal lobe. This may represent a subacute infarct or possibly a mass. Significant surrounding vasogenic edema is not appreciated. There is no appreciable mass effect. Dedicated MRI within without IV contrast is recommended for further evaluation 2. There is no intracranial hemorrhage.      MRI BRAIN W WO CONTRAST    Result Date: 4/25/2022  EXAMINATION: MRI OF THE BRAIN WITHOUT AND WITH CONTRAST  4/25/2022 12:47 pm TECHNIQUE: Multiplanar multisequence MRI of the head/brain was performed without and with the administration of intravenous contrast. COMPARISON: CT head 04/25/2022 HISTORY: ORDERING SYSTEM PROVIDED HISTORY: abnormal CT findings TECHNOLOGIST PROVIDED HISTORY: Reason for exam:->abnormal CT findings Decision Support Exception - unselect if not a suspected or confirmed emergency medical condition->Emergency Medical Condition ([place]) FINDINGS: There is restricted diffusion within the right middle cerebral artery territory. The largest area within the right temporoparietal region measures approximately 6.4 x 2.6 cm in size. There is a small focus of restricted diffusion in the high right frontoparietal cortex which measures approximately 5 mm in size. There are tiny foci of restricted diffusion along the right corona radiata. There is restricted diffusion at the right insular cortex. There is associated effacement of the right temporoparietal sulci. There are no additional areas of abnormal signal intensity within the brain parenchyma. There is no evidence of mass or midline shift. The ventricles are normal size and configuration. No extra-axial fluid collections or acute hemorrhage. There are no areas of abnormal contrast enhancement. Normal flow voids are noted within the vessels at the base of the brain. There is complete opacification of left maxillary sinus. The remaining visualized paranasal sinuses and mastoid air cells are clear. There are bilateral lens replacements. Acute ischemia in the right middle cerebral artery territory.        EKG: Atrial fibrillation with rapid ventricular response    ASSESSMENT AND PLAN:      Principal Problem:    Embolic stroke involving right middle cerebral artery [facility])  Active Problems:    New onset atrial fibrillation (HCC)    Hypothyroidism  Resolved Problems:    * No resolved hospital problems. *      1. Right MCA embolic stroke  -Given aspirin in the ED. We will continue aspirin for now and hold off on anticoagulation given onset of symptoms was several days ago and there would be high risk for hemorrhagic conversion.  -Neurology consulted, and will follow up recommendation regarding start of anticoagulation  -Gentle IV fluid hydration  -Not a candidate for tPA or intervention given NIHSS of 0 and time of onset being greater than 24 hours. We will however obtain MRA head and neck to assess for any large vessel disease  -Obtain echocardiogram    2. New onset atrial fibrillation with rapid ventricular response  -Minute dose of metoprolol tartrate in the ED for rate control. We will monitor heart rate and add standing dose if needed  -Holding off on anticoagulation at this point given risk of hemorrhagic conversion as noted above    3. Hypothyroidism  -TSH slightly low, but in the setting of acute illness. She will need repeat TSH in a few weeks to assess need of decreasing dose of levothyroxine    Code Status: Limited/DNR-CCA no intubation, no pressors, no ICU level of care *  DVT prophylaxis: Prophylactic dose enoxaparin    Disposition: Admit to intermediate care    *Addendum 16:18 Patient expressed to RN that she wanted to be DNR CC.  RN explained the difference between [facility] and DNR-CC, and I also reiterated this. Patient is agreeable to treating her current conditions, but explains that should she get to the point of decompensation of any kind, she would not want any aggressive measures including CPR, mechanical ventilation, pressors, or ICU level treatment. CODE STATUS updated to be limited code with no aggressive measures. NOTE: This report was transcribed using voice recognition software.  Every effort was made to ensure accuracy; however, inadvertent computerized transcription errors may be present.     Electronically signed by [facility] DO Gabriella on 4/25/2022 at 3:35 PM

## 2022-04-25 NOTE — ACP (ADVANCE CARE PLANNING)
Advance Care Planning     Advance Care Planning (ACP) Physician/NP/PA (Provider) Conversation      Date of ACP Conversation: 4/25/2022    Conversation Conducted with:   Patient with Decision Making [address] Maker:    Current Designated Health Care Decision Maker:      Note: Assess and validate information in current ACP documents, as indicated. If no Authorized Decision Maker has previously been identified, then patient chooses [address]:  \"Who would you like to name as your primary health care decision-maker? \"             N/A    Note: If the relationship of these Decision-Makers to the patient does NOT follow your state's Next of Kin hierarchy, recommend that patient complete ACP document that meets state-specific requirements to allow them to act on the patient's behalf when appropriate. Care Preferences:    Hospitalization: \"If your health worsens and it becomes clear that your chance of recovery is unlikely, what would your preference be regarding hospitalization? \"  Estevan Anderson DO, made a recommendation, and the patient agrees to receive comfort-focused treatment without hospitalization. This is based on conversation documented in H & P 4/25/2022      Ventilation: \"If you were in your present state of health and suddenly became very ill and were unable to breathe on your own, what would your preference be about the use of a ventilator (breathing machine) if it was available to you? \"    No    \"If your health worsens and it becomes clear that your chance of recovery is unlikely, what would your preference be about the use of a ventilator (breathing machine) if it was available to you? \"   no    Resuscitation:  \"CPR works best to restart the heart when there is a sudden event, like a heart attack, in someone who is otherwise healthy.  Unfortunately, CPR does not typically restart the heart for people who have serious health conditions or who are very sick.\"    \"In the event your heart stopped as a result of an underlying serious health condition, would you want attempts to be made to restart your heart (answer \"yes\" for attempt to resuscitate) or would you prefer a natural death (answer \"no\" for do not attempt to resuscitate)? \"   no     NOTE: If the patient has a valid advance directive AND provides care preference(s) that are inconsistent with that prior directive, advise the patient to consider either: creating a new advance directive that complies with state-specific requirements; or, if that is not possible, orally revoking that prior directive in accordance with state-specific requirements, which must be documented in the EHR.     Conversation Outcomes / Follow-Up Plan:   Limited code order entered (DNR-CCA no intubation, no defibrillation, no CPR, no pressors or ICU level of care)      Length of Voluntary ACP Conversation in minutes:  <16 minutes (Non-Billable)    Padmaja Evangelista DO

## 2022-04-25 NOTE — ED PROVIDER NOTES
HPI:  4/25/22,   Time: 9:06 AM EDT         Piero Erazo is a 65 y.o. female presenting to the urgent care for headache, beginning two days ago. The complaint has been persistent, moderate in severity, and worsened by changing position. Patient presents with a chief complaint of headache that has been present for two days and was sudden onset on Saturday. Patient states that on Saturday she felt a sudden onset of pressure behind the right ear that was 8/10 pain, describing it as pressure, and localized to the area. Patient states that she has had mastoiditis and has pain in the same area, however that pain was stabbing in nature. Patient states that the pain was relieved by rest, and that she slept all of Saturday night, all Sunday, and all Sunday night before presenting to urgent care today. Patient also complains of associated bodywide fatigue and nausea without emesis and states today that the pain is no longer localized but globally around the entire head. Patient denies any numbness, tingling, double vision, blurred vision. Patient was asked if she has any history of an irregular heartbeat, to which she confirms that she does, but cannot recall what it was. No EKG on file for reference. Decision was made to transfer the patient via POV to [address] [place] [address] for further cardiac assessment and diagnostic testing. ROS:   Pertinent positives and negatives are stated within HPI, all other systems reviewed and are negative.  --------------------------------------------- PAST HISTORY ---------------------------------------------  Past Medical History:  has a past medical history of Thyroid disease. Past Surgical History:  has a past surgical history that includes Breast enhancement surgery. Social History:  reports that she quit smoking about 30 years ago. Her smoking use included cigarettes. She has never used smokeless tobacco. She reports current alcohol use.     Family History: family history is not on file.     The patients home medications have been reviewed. Allergies: Patient has no known allergies. -------------------------------------------------- RESULTS -------------------------------------------------  All laboratory and radiology results have been personally reviewed by myself   LABS:  No results found for this visit on 04/25/22. RADIOLOGY:  Interpreted by Radiologist.  No orders to display       ------------------------- NURSING NOTES AND VITALS REVIEWED ---------------------------   The nursing notes within the ED encounter and vital signs as below have been reviewed. /71   Pulse 94   Temp 97.2 °F (36.2 °C) (Infrared)   Resp 18   Ht 5' 8\" (1.727 m)   Wt 124 lb (56.2 kg)   SpO2 96%   BMI 18.85 kg/m²   Oxygen Saturation Interpretation: Normal      ---------------------------------------------------PHYSICAL EXAM--------------------------------------      Constitutional/General: Alert and oriented x3, well appearing, non toxic in NAD  Head: NC/AT, atraumatic and non tender to the right mastoid area  Eyes: PERRL, EOMI, 3 mm and briskly reactive. No nystagmus  Mouth: Oropharynx clear, handling secretions, no trismus  Neck: Supple, full ROM, no meningeal signs  Pulmonary: Lungs clear to auscultation bilaterally, no wheezes, rales, or rhonchi. Not in respiratory distress  Cardiovascular:  Irregular rate and rhythm, grade 2 systolic murmurs, no gallops, or rubs. 2+ distal pulses  Abdomen: Soft, non tender, non distended,   Extremities: Moves all extremities x 4.  Warm and well perfused  Skin: warm and dry without rash  Neurologic: GCS 15,  Psych: Normal Affect      ------------------------------ ED COURSE/MEDICAL DECISION MAKING----------------------  Medications - No data to display      Medical Decision Making:    Patient advised the limitations at this current urgent care she will need to go to the emergency room for evaluation of the irregular heartbeat as well as her complaints of headache there is no old CAT scan in chart for review. There is no prior EKG in chart for review. She is neurologically intact. EMS was offered however she declined stating that she feels well enough to drive to the emergency room. Call was placed to [facility] ER attending made aware patient, never further evaluation. Counseling: The emergency provider has spoken with the patient and discussed todays results, in addition to providing specific details for the plan of care and counseling regarding the diagnosis and prognosis. Questions are answered at this time and they are agreeable with the plan.      --------------------------------- IMPRESSION AND DISPOSITION ---------------------------------    IMPRESSION  1. Nonintractable headache, unspecified chronicity pattern, unspecified headache type    2.  Irregular heart beat        DISPOSITION  Disposition: Discharge to [facility]  Patient condition is stable                 LILIAN Bailon CNP  04/25/22 [id number]

## 2022-04-26 ENCOUNTER — APPOINTMENT (OUTPATIENT)
Dept: MRI IMAGING | Age: 77
DRG: 065 | End: 2022-04-26
Payer: MEDICARE

## 2022-04-26 ENCOUNTER — APPOINTMENT (OUTPATIENT)
Dept: ULTRASOUND IMAGING | Age: 77
DRG: 065 | End: 2022-04-26
Payer: MEDICARE

## 2022-04-26 PROBLEM — I50.22 CHRONIC HFREF (HEART FAILURE WITH REDUCED EJECTION FRACTION) (HCC): Status: ACTIVE | Noted: 2022-04-26

## 2022-04-26 LAB
ALBUMIN SERPL-MCNC: 3.5 G/DL (ref 3.5–5.2)
ALP BLD-CCNC: 80 U/L (ref 35–104)
ALT SERPL-CCNC: 18 U/L (ref 0–32)
ANION GAP SERPL CALCULATED.3IONS-SCNC: 10 MMOL/L (ref 7–16)
AST SERPL-CCNC: 25 U/L (ref 0–31)
BASOPHILS ABSOLUTE: 0.04 E9/L (ref 0–0.2)
BASOPHILS RELATIVE PERCENT: 0.7 % (ref 0–2)
BILIRUB SERPL-MCNC: 0.7 MG/DL (ref 0–1.2)
BUN BLDV-MCNC: 16 MG/DL (ref 6–23)
CALCIUM SERPL-MCNC: 8.9 MG/DL (ref 8.6–10.2)
CHLORIDE BLD-SCNC: 106 MMOL/L (ref 98–107)
CHOLESTEROL, TOTAL: 160 MG/DL (ref 0–199)
CO2: 24 MMOL/L (ref 22–29)
CREAT SERPL-MCNC: 0.9 MG/DL (ref 0.5–1)
EOSINOPHILS ABSOLUTE: 0.15 E9/L (ref 0.05–0.5)
EOSINOPHILS RELATIVE PERCENT: 2.5 % (ref 0–6)
GFR AFRICAN AMERICAN: >60
GFR NON-AFRICAN AMERICAN: >60 ML/MIN/1.73
GLUCOSE BLD-MCNC: 89 MG/DL (ref 74–99)
HCT VFR BLD CALC: 40.7 % (ref 34–48)
HDLC SERPL-MCNC: 55 MG/DL
HEMOGLOBIN: 13.7 G/DL (ref 11.5–15.5)
IMMATURE GRANULOCYTES #: 0.01 E9/L
IMMATURE GRANULOCYTES %: 0.2 % (ref 0–5)
LDL CHOLESTEROL CALCULATED: 87 MG/DL (ref 0–99)
LV EF: 38 %
LVEF MODALITY: NORMAL
LYMPHOCYTES ABSOLUTE: 1.94 E9/L (ref 1.5–4)
LYMPHOCYTES RELATIVE PERCENT: 32.1 % (ref 20–42)
MCH RBC QN AUTO: 31.6 PG (ref 26–35)
MCHC RBC AUTO-ENTMCNC: 33.7 % (ref 32–34.5)
MCV RBC AUTO: 93.8 FL (ref 80–99.9)
MONOCYTES ABSOLUTE: 0.53 E9/L (ref 0.1–0.95)
MONOCYTES RELATIVE PERCENT: 8.8 % (ref 2–12)
NEUTROPHILS ABSOLUTE: 3.37 E9/L (ref 1.8–7.3)
NEUTROPHILS RELATIVE PERCENT: 55.7 % (ref 43–80)
PDW BLD-RTO: 12.7 FL (ref 11.5–15)
PLATELET # BLD: 213 E9/L (ref 130–450)
PMV BLD AUTO: 9.9 FL (ref 7–12)
POTASSIUM SERPL-SCNC: 4.3 MMOL/L (ref 3.5–5)
RBC # BLD: 4.34 E12/L (ref 3.5–5.5)
SODIUM BLD-SCNC: 140 MMOL/L (ref 132–146)
TOTAL PROTEIN: 6.3 G/DL (ref 6.4–8.3)
TRIGL SERPL-MCNC: 90 MG/DL (ref 0–149)
VLDLC SERPL CALC-MCNC: 18 MG/DL
WBC # BLD: 6 E9/L (ref 4.5–11.5)

## 2022-04-26 PROCEDURE — 80053 COMPREHEN METABOLIC PANEL: CPT

## 2022-04-26 PROCEDURE — 70547 MR ANGIOGRAPHY NECK W/O DYE: CPT

## 2022-04-26 PROCEDURE — 6370000000 HC RX 637 (ALT 250 FOR IP): Performed by: PSYCHIATRY & NEUROLOGY

## 2022-04-26 PROCEDURE — 6360000002 HC RX W HCPCS: Performed by: INTERNAL MEDICINE

## 2022-04-26 PROCEDURE — 6370000000 HC RX 637 (ALT 250 FOR IP): Performed by: INTERNAL MEDICINE

## 2022-04-26 PROCEDURE — 2060000000 HC ICU INTERMEDIATE R&B

## 2022-04-26 PROCEDURE — 93306 TTE W/DOPPLER COMPLETE: CPT

## 2022-04-26 PROCEDURE — 85025 COMPLETE CBC W/AUTO DIFF WBC: CPT

## 2022-04-26 PROCEDURE — 93880 EXTRACRANIAL BILAT STUDY: CPT

## 2022-04-26 PROCEDURE — 80061 LIPID PANEL: CPT

## 2022-04-26 PROCEDURE — 99233 SBSQ HOSP IP/OBS HIGH 50: CPT | Performed by: INTERNAL MEDICINE

## 2022-04-26 PROCEDURE — 36415 COLL VENOUS BLD VENIPUNCTURE: CPT

## 2022-04-26 PROCEDURE — 2580000003 HC RX 258: Performed by: INTERNAL MEDICINE

## 2022-04-26 PROCEDURE — 70544 MR ANGIOGRAPHY HEAD W/O DYE: CPT

## 2022-04-26 RX ORDER — LISINOPRIL 5 MG/1
5 TABLET ORAL DAILY
Status: DISCONTINUED | OUTPATIENT
Start: 2022-04-27 | End: 2022-04-27 | Stop reason: HOSPADM

## 2022-04-26 RX ORDER — DIAZEPAM 5 MG/ML
5 INJECTION, SOLUTION INTRAMUSCULAR; INTRAVENOUS
Status: COMPLETED | OUTPATIENT
Start: 2022-04-26 | End: 2022-04-26

## 2022-04-26 RX ORDER — ATORVASTATIN CALCIUM 40 MG/1
40 TABLET, FILM COATED ORAL NIGHTLY
Status: DISCONTINUED | OUTPATIENT
Start: 2022-04-26 | End: 2022-04-27 | Stop reason: HOSPADM

## 2022-04-26 RX ORDER — METOPROLOL SUCCINATE 25 MG/1
12.5 TABLET, EXTENDED RELEASE ORAL DAILY
Status: DISCONTINUED | OUTPATIENT
Start: 2022-04-27 | End: 2022-04-27 | Stop reason: HOSPADM

## 2022-04-26 RX ADMIN — LEVOTHYROXINE SODIUM 125 MCG: 25 TABLET ORAL at 05:46

## 2022-04-26 RX ADMIN — DIAZEPAM 5 MG: 5 INJECTION, SOLUTION INTRAMUSCULAR; INTRAVENOUS at 07:46

## 2022-04-26 RX ADMIN — Medication 10 ML: at 20:39

## 2022-04-26 RX ADMIN — ASPIRIN 81 MG 81 MG: 81 TABLET ORAL at 10:38

## 2022-04-26 RX ADMIN — Medication 10 ML: at 10:39

## 2022-04-26 RX ADMIN — ATORVASTATIN CALCIUM 40 MG: 40 TABLET, FILM COATED ORAL at 20:35

## 2022-04-26 NOTE — CARE COORDINATION
4/26/22 1508 CM note: NO COVID TESTING. Room air. Discharge plan remains home and pt declines [address] or any other needs. Pts vehicle is in the parking lot and plans to drive herself home.  Electronically signed by Marguerite Espinosa RN on 4/26/2022 at 3:09 PM

## 2022-04-26 NOTE — PROGRESS NOTES
[address] [place], [place] [address]ist   Progress Note    Admitting Date and Time: 4/25/2022  9:49 AM  Admit Dx: New onset atrial fibrillation ([facility] [address] [I48.91]  Ischemic stroke ([facility] [address] [I63.9]  Cerebrovascular accident (CVA), unspecified mechanism ([facility] [I63.9]    Subjective/interval history:    Pt admitted yesterday afternoon with ischemic stroke likely due to atrial fibrillation. Her only symptom was mild headache, and this has resolved. No weakness, numbness, facial asymmetry, difficulty with speech, or difficulty swallowing. Echocardiogram was done today and shows moderately reduced ejection fraction at 35 to 40%. She does not have any heart failure symptoms at this time. No ischemic work-up in the past.    ROS: denies fever, chills, cp, sob, n/v, HA unless stated above.  atorvastatin  40 mg Oral Nightly    [START ON 4/27/2022] metoprolol succinate  12.5 mg Oral Daily    [START ON 4/27/2022] lisinopril  5 mg Oral Daily    levothyroxine  125 mcg Oral Daily    sodium chloride flush  5-40 mL IntraVENous 2 times per day    enoxaparin  40 mg SubCUTAneous Daily    aspirin  81 mg Oral Daily     sodium chloride flush, 5-40 mL, PRN  sodium chloride, 25 mL, PRN  ondansetron, 4 mg, Q8H PRN   Or  ondansetron, 4 mg, Q6H PRN  polyethylene glycol, 17 g, Daily PRN  acetaminophen, 650 mg, Q6H PRN   Or  acetaminophen, 650 mg, Q6H PRN  perflutren lipid microspheres, 1.5 mL, ONCE PRN         Objective:    /82   Pulse 77   Temp 98.7 °F (37.1 °C) (Oral)   Resp 18   Ht 5' 8\" (1.727 m)   Wt 124 lb (56.2 kg)   SpO2 96%   BMI 18.85 kg/m²   General Appearance: alert and oriented to person, place and time and in no acute distress  Skin: warm and dry  Head: normocephalic and atraumatic  Eyes: pupils equal, round, and reactive to light, extraocular eye movements intact, conjunctivae normal  Neck: neck supple and non tender without mass   Pulmonary/Chest: Nonlabored on room air.   Clear to auscultation bilaterally  Cardiovascular: normal rate, normal S1 and S2 and no carotid bruits  Abdomen: soft, non-tender, non-distended, normal bowel sounds, no masses or organomegaly  Extremities: no cyanosis, no clubbing and no edema  Neurologic: no cranial nerve deficit and speech normal      Recent Labs     04/25/22  1010 04/26/22  0435    140   K 4.4 4.3    106   CO2 24 24   BUN 16 16   CREATININE 0.9 0.9   GLUCOSE 117* 89   CALCIUM 9.5 8.9       Recent Labs     04/25/22  1010 04/26/22  0435   ALKPHOS 90 80   PROT 7.5 6.3*   LABALBU 4.2 3.5   BILITOT 1.0 0.7   AST 23 25   ALT 16 18       Recent Labs     04/25/22  1010 04/26/22  0435   WBC 5.5 6.0   RBC 4.61 4.34   HGB 14.5 13.7   HCT 44.0 40.7   MCV 95.4 93.8   MCH 31.5 31.6   MCHC 33.0 33.7   RDW 12.9 12.7    213   MPV 9.5 9.9         Radiology:   US CAROTID ARTERY BILATERAL   Final Result   The right internal carotid artery demonstrates 0-50% stenosis. The left internal carotid artery demonstrates 0-50% stenosis. Bilateral vertebral arteries are patent with flow in the normal direction. MRA NECK WO CONTRAST   Final Result   1. No apparent arterial high grade stenosis, occlusion or aneurysm within the   head. 2. No significant stenosis within the cervical ICAs per NASCET criteria. Imaged portions of the cervical vertebral and common carotid arteries are   unremarkable, however please note that the origins were not included within   the field of view. 3. Please note that study is slightly suboptimal given nonvisualization of   the arch vessel origins, which were not included within the field of view. MRA HEAD WO CONTRAST   Final Result   1. No apparent arterial high grade stenosis, occlusion or aneurysm within the   head. 2. No significant stenosis within the cervical ICAs per NASCET criteria.    Imaged portions of the cervical vertebral and common carotid arteries are   unremarkable, however please note that the origins were not included within   the field of view. 3. Please note that study is slightly suboptimal given nonvisualization of   the arch vessel origins, which were not included within the field of view. MRI BRAIN W WO CONTRAST   Final Result   Acute ischemia in the right middle cerebral artery territory. CT Head WO Contrast   Final Result   1. 3.3 x 2.7 x 2.5 cm abnormal area of low attenuation within the posterior   right parietal lobe. This may represent a subacute infarct or possibly a   mass. Significant surrounding vasogenic edema is not appreciated. There is   no appreciable mass effect. Dedicated MRI within without IV contrast is   recommended for further evaluation   2. There is no intracranial hemorrhage. XR CHEST (2 VW)   Final Result   Borderline cardiomegaly and calcification at the mitral annulus. Assessment and Plan:  Principal Problem:    Embolic stroke involving right middle cerebral artery (HCC)  Active Problems:    New onset atrial fibrillation (HCC)    Hypothyroidism    Chronic HFrEF (heart failure with reduced ejection fraction) ([facility] [address]  Resolved Problems:    * No resolved hospital problems. *        1. Right MCA embolic stroke  -Given aspirin in the ED. We will continue aspirin for now and hold off on anticoagulation given onset of symptoms was several days ago and there would be high risk for hemorrhagic conversion. -MRI brain, MRA neck, and carotid ultrasound did not show any significant large vessel stenosis  -Neurology recommendations appreciated  -Echocardiogram does not show thrombus or atrial septal defect, though visualization was poor     2. New onset atrial fibrillation with rapid ventricular response  -Minute dose of metoprolol tartrate in the ED for rate control  -Holding off on anticoagulation at this point given risk of hemorrhagic conversion as noted above     3. Hypothyroidism  -TSH slightly low, but in the setting of acute illness.   She will need repeat TSH in a few weeks to assess need of decreasing dose of levothyroxine    4. Chronic heart failure with reduced ejection fraction  -EF 35 to 40% on echocardiogram  -No symptoms of acute exacerbation at this time  -Start low-dose metoprolol succinate and lisinopril tomorrow  -Cardiology consult requested. She will likely need ischemic work-up at some, however in the setting of acute stroke, will defer to cardiology recommendation     Code Status: Limited/DNR-CCA no intubation, no pressors, no ICU level of care per discussion with patient 4/25  DVT prophylaxis: Prophylactic dose enoxaparin     Disposition: Plan for discharge home. Timeframe pending further heart failure work-up      NOTE: This report was transcribed using voice recognition software. Every effort was made to ensure accuracy; however, inadvertent computerized transcription errors may be present.      Electronically signed by Bennie Joseph DO on 4/26/2022 at 6:48 PM

## 2022-04-26 NOTE — PLAN OF CARE
Problem: Discharge Planning  Goal: Discharge to home or other facility with appropriate resources  4/26/2022 1239 by Silvia Pacheco RN  Outcome: Progressing  4/26/2022 0427 by Anna Briscoe RN  Outcome: Progressing     Problem: Pain  Goal: Verbalizes/displays adequate comfort level or baseline comfort level  4/26/2022 1239 by Silvia Pacheco RN  Outcome: Progressing  4/26/2022 0427 by Anna Briscoe RN  Outcome: Progressing     Problem: Safety - Adult  Goal: Free from fall injury  4/26/2022 1239 by Silvia Pacheco RN  Outcome: Progressing  4/26/2022 0427 by Anna Briscoe RN  Outcome: Progressing

## 2022-04-26 NOTE — PROGRESS NOTES
Pt wants to discuss Stroke status/updates with MD before taking Lovenox, pt refused this morning until then.

## 2022-04-26 NOTE — CONSULTS
History Of Present Illness: CHIEF COMPLAINT: Headache     History of Present Illness:  65 y.o. female with a history of hypothyroidism and remote tobacco use presents with headache. On Saturday, she noticed a headache and pressure in the back of her head on the right. She took Aleve with some relief, but has noted she has been very sleepy over the last few days. No other provoking or palliating factors. Denies any numbness, weakness, difficulty with speech, difficulty with swallowing, nausea, vomiting, or heart palpitations.     In the ED, vital signs significant for tachycardia and EKG showed atrial fibrillation. CT of the head without contrast showed a large area with low attenuation in the posterior right parietal lobe. Follow-up MRI confirmed acute ischemia in the right MCA territory. At this time, her NIHSS is 0 and her headache is nearly gone.     Informant(s) for H&P: Patient, ED resident physician, chart review  As above per hospitalist.    The patient is a 65 y.o. female with significant past medical history of see below who presents with above. The patient has the following symptoms:    Change in level of consciousness: alert    New Weakness: no    Numbness or Tingling: no    Difficulty Swallowing: no    Current Medications:   Scheduled Meds:   levothyroxine  125 mcg Oral Daily    sodium chloride flush  5-40 mL IntraVENous 2 times per day    enoxaparin  40 mg SubCUTAneous Daily    aspirin  81 mg Oral Daily     Continuous Infusions:   sodium chloride 50 mL/hr at 04/25/22 1652    sodium chloride       PRN Meds:sodium chloride flush, sodium chloride, ondansetron **OR** ondansetron, polyethylene glycol, acetaminophen **OR** acetaminophen, perflutren lipid microspheres    Allergies:  Patient has no known allergies. Social History:   TOBACCO:   reports that she quit smoking about 30 years ago. Her smoking use included cigarettes.  She has never used smokeless tobacco.  ETOH:   reports current alcohol use. Past Medical History:        Diagnosis Date    Atrial fibrillation ([facility] [address] 04/25/2022    hypothyroidism     Ischemic stroke ([facility] [address] 04/25/2022       Past Surgical History:        Procedure Laterality Date    BREAST ENHANCEMENT SURGERY           Outside reports reviewed: ER records, historical medical records, lab reports and radiology reports. Patient's medications, allergies, past medical, surgical, social and family histories were reviewed and updated as appropriate. Review of Systems  A comprehensive review of systems was negative except for:       Objective:     Neuro exam 120/100 p 72 t 98  General: normal orientation and alertness. Cranial nerve testing was normal. Visual fields full to confrontation  Funduscopic eye exam revealed not testable. Motor exam: normal strength and muscle mass. Deep tendon reflexes were 1+ bilaterally. Plantar responses were flexor bilaterally. Cerebellar exam noted finger to nose without dysmetria. Sensation was normal to joint position sense, light touch and a pin prick . Lilia Graven       Assessment:   Probable right mca branch cardioembolic stroke secondary to newly diagnosed atrial fibrillation  MRA incomplete but essentially negative      Plan:   Carotid us  Cardiology evaluation  Recommend waiting approximately 2 weeks before anticoagulating to reduce risk of hemorrhagic conversion  Recommend repeat mri of brain in 2 months to rule out alternative explanations such as neoplasm (headache unusual for stroke)  Statin  Once anticoagulation started, from neurology viewpoint, would not combine with asa  Follow up with stroke clinic [facility] neurology [place]

## 2022-04-27 VITALS
SYSTOLIC BLOOD PRESSURE: 126 MMHG | WEIGHT: 124 LBS | HEART RATE: 78 BPM | TEMPERATURE: 98.1 F | BODY MASS INDEX: 18.79 KG/M2 | DIASTOLIC BLOOD PRESSURE: 93 MMHG | RESPIRATION RATE: 16 BRPM | OXYGEN SATURATION: 98 % | HEIGHT: 68 IN

## 2022-04-27 PROBLEM — I42.9 CARDIOMYOPATHY (HCC): Status: ACTIVE | Noted: 2022-04-26

## 2022-04-27 PROCEDURE — 6370000000 HC RX 637 (ALT 250 FOR IP): Performed by: INTERNAL MEDICINE

## 2022-04-27 PROCEDURE — 2580000003 HC RX 258: Performed by: INTERNAL MEDICINE

## 2022-04-27 PROCEDURE — 99223 1ST HOSP IP/OBS HIGH 75: CPT | Performed by: INTERNAL MEDICINE

## 2022-04-27 PROCEDURE — 99239 HOSP IP/OBS DSCHRG MGMT >30: CPT | Performed by: INTERNAL MEDICINE

## 2022-04-27 RX ORDER — ATORVASTATIN CALCIUM 40 MG/1
40 TABLET, FILM COATED ORAL NIGHTLY
Qty: 30 TABLET | Refills: 0 | Status: SHIPPED | OUTPATIENT
Start: 2022-04-27 | End: 2022-05-27 | Stop reason: SDUPTHER

## 2022-04-27 RX ORDER — ASPIRIN 81 MG/1
81 TABLET, CHEWABLE ORAL DAILY
Qty: 30 TABLET | Refills: 0 | Status: SHIPPED | OUTPATIENT
Start: 2022-04-28 | End: 2022-06-24

## 2022-04-27 RX ORDER — METOPROLOL SUCCINATE 25 MG/1
12.5 TABLET, EXTENDED RELEASE ORAL DAILY
Qty: 30 TABLET | Refills: 0 | Status: SHIPPED | OUTPATIENT
Start: 2022-04-28 | End: 2022-05-20 | Stop reason: DRUGHIGH

## 2022-04-27 RX ORDER — LISINOPRIL 5 MG/1
5 TABLET ORAL DAILY
Qty: 30 TABLET | Refills: 0 | Status: SHIPPED | OUTPATIENT
Start: 2022-04-28 | End: 2022-05-24 | Stop reason: SDUPTHER

## 2022-04-27 RX ADMIN — METOPROLOL SUCCINATE 12.5 MG: 25 TABLET, EXTENDED RELEASE ORAL at 08:39

## 2022-04-27 RX ADMIN — LISINOPRIL 5 MG: 5 TABLET ORAL at 08:40

## 2022-04-27 RX ADMIN — Medication 10 ML: at 09:00

## 2022-04-27 RX ADMIN — ASPIRIN 81 MG 81 MG: 81 TABLET ORAL at 08:39

## 2022-04-27 RX ADMIN — ACETAMINOPHEN 650 MG: 325 TABLET ORAL at 12:15

## 2022-04-27 RX ADMIN — LEVOTHYROXINE SODIUM 125 MCG: 25 TABLET ORAL at 05:00

## 2022-04-27 RX ADMIN — ACETAMINOPHEN 650 MG: 325 TABLET ORAL at 04:48

## 2022-04-27 ASSESSMENT — PAIN DESCRIPTION - LOCATION
LOCATION: SHOULDER

## 2022-04-27 ASSESSMENT — PAIN DESCRIPTION - ORIENTATION
ORIENTATION: RIGHT

## 2022-04-27 ASSESSMENT — PAIN SCALES - GENERAL
PAINLEVEL_OUTOF10: 6
PAINLEVEL_OUTOF10: 5
PAINLEVEL_OUTOF10: 4

## 2022-04-27 NOTE — CONSULTS
INPATIENT CARDIOLOGY CONSULT    Name: Brandi Fair    Age: 65 y.o. Date of Admission: 2022  9:49 AM    Date of Service: 2022    Reason for Consultation: Cardiomyopathy    Referring Physician: Lio Fernández DO    Primary Cardiologist: Remotely known to [address] [place] [address]    History of Present Illness:   Brandi Fair is a 65 y.o. female (no prior association [facility] cardiology) who presented on 2022 for further evaluation of headache. She was found to have right MCA branch presumed embolic stroke from newly diagnosed atrial fibrillation. As part of stroke work-up, echocardiogram showed moderately reduced LV systolic function EF 35 to 40% and has been consulted for this reason. She feels well. She denies chest pain, shortness breath, palpitations, any awareness of being out of rhythm, focal neurodeficits, edema. She is typically very active and does not endorse anginal symptoms. States she was evaluated many years ago for SVT and ablation was considered but her symptoms improved. Review of Systems:   Complete review of systems negative except as described above.     Past Medical History:  Past Medical History:   Diagnosis Date    Atrial fibrillation ([facility] [address] 2022    hypothyroidism     Ischemic stroke ([facility] [address] 2022       Past Surgical History:  Past Surgical History:   Procedure Laterality Date    BREAST ENHANCEMENT SURGERY         Family History:  Family History   Problem Relation Age of Onset    Stroke Mother     Atrial Fibrillation Mother     Pneumonia Father     Heart Failure Brother        Social History:  Social History     Tobacco Use    Smoking status: Former Smoker     Types: Cigarettes     Quit date:      Years since quittin.3    Smokeless tobacco: Never Used   Substance Use Topics    Alcohol use: Yes     Comment: occasionally    Drug use: Not on file       Allergies:  No Known Allergies    Home Medications:  Prior to Admission medications Medication Sig Start Date End Date Taking?  Authorizing Provider   Multiple Vitamins-Minerals (THERAPEUTIC MULTIVITAMIN-MINERALS) tablet Take 1 tablet by mouth daily   Yes Historical Provider, MD   Cholecalciferol (VITAMIN D3) 50 MCG (2000 UT) CAPS Take by mouth Patient unsure if this is the amount   Yes Historical Provider, MD   Naproxen Sodium (ALEVE PO) Take by mouth    Historical Provider, MD   levothyroxine (SYNTHROID) 125 MCG tablet Take 125 mcg by mouth Daily    Historical Provider, MD       Current Medications:    Current Facility-Administered Medications:     atorvastatin (LIPITOR) tablet 40 mg, 40 mg, Oral, Nightly, Jp Gaspar MD, 40 mg at 04/26/22 2035    metoprolol succinate (TOPROL XL) extended release tablet 12.5 mg, 12.5 mg, Oral, Daily, Arthur Quiroz DO    lisinopril (PRINIVIL;ZESTRIL) tablet 5 mg, 5 mg, Oral, Daily, Arthur Quiroz DO    levothyroxine (SYNTHROID) tablet 125 mcg, 125 mcg, Oral, Daily, Arthur Quiroz DO, 125 mcg at 04/27/22 0500    sodium chloride flush 0.9 % injection 5-40 mL, 5-40 mL, IntraVENous, 2 times per day, Arthur Quiroz DO, 10 mL at 04/26/22 2039    sodium chloride flush 0.9 % injection 5-40 mL, 5-40 mL, IntraVENous, PRN, Sharon Quiroz DO    0.9 % sodium chloride infusion, 25 mL, IntraVENous, PRN, Sharon Quiroz DO    enoxaparin (LOVENOX) injection 40 mg, 40 mg, SubCUTAneous, Daily, Arthur Quiroz DO, 40 mg at 04/25/22 1652    ondansetron (ZOFRAN-ODT) disintegrating tablet 4 mg, 4 mg, Oral, Q8H PRN **OR** ondansetron (ZOFRAN) injection 4 mg, 4 mg, IntraVENous, Q6H PRN, Sharon Quiroz DO    polyethylene glycol (GLYCOLAX) packet 17 g, 17 g, Oral, Daily PRN, Rashad Arredondo DO    acetaminophen (TYLENOL) tablet 650 mg, 650 mg, Oral, Q6H PRN, 650 mg at 04/27/22 0448 **OR** acetaminophen (TYLENOL) suppository 650 mg, 650 mg, Rectal, Q6H PRN, Arthur Quiroz DO    perflutren lipid microspheres (DEFINITY) injection 1.65 mg, 1.5 mL, IntraVENous, ONCE PRN, Diogenes Lorenzo DO    aspirin chewable tablet 81 mg, 81 mg, Oral, Daily, Arthur Quiroz DO, 81 mg at 04/26/22 1038    Physical Exam:  /80   Pulse 85   Temp 98.3 °F (36.8 °C) (Oral)   Resp 18   Ht 5' 8\" (1.727 m)   Wt 124 lb (56.2 kg)   SpO2 98%   BMI 18.85 kg/m²   Wt Readings from Last 3 Encounters:   04/25/22 124 lb (56.2 kg)   04/25/22 124 lb (56.2 kg)   04/25/22 124 lb (56.2 kg)     Appearance: Well-appearing female, awake, alert, no acute respiratory distress  Skin: Intact, no rash  Head: Normocephalic, atraumatic  Eyes: EOMI, no conjunctival erythema  ENMT: No pharyngeal erythema, MMM, no rhinorrhea  Neck: Supple, no elevated JVP, no carotid bruits  Lungs: Clear to auscultation bilaterally. No wheezes, rales, or rhonchi.   Cardiac: PMI nondisplaced, irregular rhythm with a normal rate, normal S1 & S2, no murmurs  Abdomen: Soft, nontender, +bowel sounds  Extremities: Moves all extremities x 4, no lower extremity edema  Neurologic: No focal motor deficits apparent, normal mood and affect  Peripheral Pulses: Intact posterior tibial pulses bilaterally    Intake/Output:    Intake/Output Summary (Last 24 hours) at 4/27/2022 3932  Last data filed at 4/26/2022 2259  Gross per 24 hour   Intake 1080 ml   Output --   Net 1080 ml     I/O this shift:  In: 240 [P.O.:240]  Out: -     Laboratory Tests:  Recent Labs     04/25/22  1010 04/26/22  0435    140   K 4.4 4.3    106   CO2 24 24   BUN 16 16   CREATININE 0.9 0.9   GLUCOSE 117* 89   CALCIUM 9.5 8.9     No results found for: MG  Recent Labs     04/25/22  1010 04/26/22  0435   ALKPHOS 90 80   ALT 16 18   AST 23 25   PROT 7.5 6.3*   BILITOT 1.0 0.7   LABALBU 4.2 3.5     Recent Labs     04/25/22  1010 04/26/22  0435   WBC 5.5 6.0   RBC 4.61 4.34   HGB 14.5 13.7   HCT 44.0 40.7   MCV 95.4 93.8   MCH 31.5 31.6   MCHC 33.0 33.7   RDW 12.9 12.7    213   MPV 9.5 9.9     No results found for: CKTOTAL, CKMB, CKMBINDEX, TROPONINI  No results found for: INR, PROTIME  Lab Results   Component Value Date    TSH 0.260 (L) 2022     No results found for: LABA1C  No results found for: EAG  Lab Results   Component Value Date    CHOL 160 2022     Lab Results   Component Value Date    TRIG 90 2022     Lab Results   Component Value Date    HDL 55 2022     Lab Results   Component Value Date    LDLCALC 87 2022     Lab Results   Component Value Date    LABVLDL 18 2022     No results found for: CHOLHDLRATIO  Recent Labs     22  1010   PROBNP 2,341*       Cardiac Tests:  EK2022: Atrial fibrillation 108 beats minute. Left axis deviation. Possible prior septal infarct. Telemetry:   Atrial fibrillation 90s, occasional PVCs    Chest X-ray:   22    Impression   Borderline cardiomegaly and calcification at the mitral annulus. MRI brain  Impression   Acute ischemia in the right middle cerebral artery territory. MRA  Impression   1. No apparent arterial high grade stenosis, occlusion or aneurysm within the   head. 2. No significant stenosis within the cervical ICAs per NASCET criteria. Imaged portions of the cervical vertebral and common carotid arteries are   unremarkable, however please note that the origins were not included within   the field of view. 3. Please note that study is slightly suboptimal given nonvisualization of   the arch vessel origins, which were not included within the field of view. Echocardiogram:   TTE 22   Normal left ventricular size. LV systolic function is moderately reduced. Ejection fraction is visually estimated at 35-40%. Abnormal diastolic function. No regional wall motion abnormalities seen. Mild left ventricular concentric hypertrophy noted. The left atrium is severely dilated. Agitated saline injected for shunt evaluation; no obvious shunt but   visualization poor and can not be excluded.    Mild tricuspid regurgitation. Stress test:      Cardiac catheterization:     -------------------------------------------------------------------------------------------------------------------------------------------------------------  IMPRESSION:  1. New onset cardiomyopathy EF 35-40%. Unknown etiology  2. Newly diagnosed atrial fibrillation, unknown duration. [id number] score 5  3. Acute right MCA stroke, suspected embolic due to AF  4. Hypothyroidism  5. DNR/DNI    RECOMMENDATIONS:  Stable from a cardiac standpoint. No anginal symptoms or evidence of CHF.  GDMT initiated by primary service, metoprolol succinate and lisinopril, continue   Aspirin for now, transition to oral anticoagulation (after 2 weeks per neurology recommendation to reduce risk of hemorrhagic transformation) for further stroke risk reduction   Continue statin   Outpatient ischemic evaluation: Stress test versus coronary CTA versus cath   If remains in atrial fibrillation at outpatient follow-up, will arrange cardioversion once on appropriate anticoagulation and with XANDER guidance given stroke and LV dysfunction   Otherwise okay for discharge from cardiology standpoint   Follow-up with me in the office in a few weeks   Will be available as needed, please call with questions    Thank you for allowing me to participate in your patient's care. Please feel free to contact me if you have any questions or concerns. Zoey Nesbitt MD, [address] [facility]    NOTE: This report was transcribed using voice recognition software. Every effort was made to ensure accuracy; however, inadvertent computerized transcription errors may be present.

## 2022-04-27 NOTE — FLOWSHEET NOTE
04/27/22 0944   Treatment Team Notification   Reason for Communication Evaluate  (Pt has concerns about Lovenox )   Team Member Name Dr Trell Fisher Attending Provider   Method of Communication Face to face   Response Other (Comment); See orders  (Dr aware & is d/c patient & will be stopping Lovenox )

## 2022-04-27 NOTE — PLAN OF CARE
Problem: Discharge Planning  Goal: Discharge to home or other facility with appropriate resources  Outcome: Completed     Problem: Pain  Goal: Verbalizes/displays adequate comfort level or baseline comfort level  Outcome: Completed     Problem: Safety - Adult  Goal: Free from fall injury  Outcome: Completed

## 2022-04-27 NOTE — DISCHARGE SUMMARY
[facility] Physician Discharge Summary       Lindsey Juan MD  [address] [facility] [address]  [phone]    Call  schedule appointment to be seen in 2-4 weeks     Ashleigh Howe MD  [facility] [phone]    Call today        Activity level: Resume normal activity    Diet: ADULT DIET; Regular    Labs: Routine labs per primary care physician. Also recommend checking TSH in 1 to 2 months    Condition at discharge: Stable    Dispo: Home    Patient Magdiel Rahman  [id number]  65 y.o.  1945    Admit date: 4/25/2022    Discharge date and time:  4/27/2022  9:47 AM    Admission Diagnoses: Principal Problem:    Embolic stroke involving right middle cerebral artery [facility])  Active Problems:    New onset atrial fibrillation ([facility] [address]    Hypothyroidism    Cardiomyopathy ([facility] [address]  Resolved Problems:    * No resolved hospital problems. *      Discharge Diagnoses: Principal Problem:    Embolic stroke involving right middle cerebral artery (HCC)  Active Problems:    New onset atrial fibrillation ([address]    Hypothyroidism    Cardiomyopathy ([facility] [address]  Resolved Problems:    * No resolved hospital problems. *      Consults:  IP CONSULT TO NEUROLOGY  IP CONSULT TO NEUROLOGY  IP CONSULT TO CARDIOLOGY  IP CONSULT TO CARDIOLOGY  IP CONSULT TO SPIRITUAL SERVICES    Procedures: None    Hospital Course: This is a 74-year-old female with a history significant for hypothyroidism that was admitted to the hospital with ischemic stroke. Her only symptom was intermittent headache. CT of the head in the ED followed by MRI showed findings consistent with acute ischemia in the right MCA territory. EKG revealed atrial fibrillation. She was started on aspirin and statin; holding off on anticoagulation for 2 to 4 weeks per neurology recommendation to prevent hemorrhagic conversion. MRA of the head and neck did not show any large vessel stenosis.   Due to incomplete visualization of the arteries in the neck on MRA images, carotid ultrasound was done which did not show any significant carotid or vertebral stenosis. Echocardiogram was done and did not show thrombus or clear evidence of PFO, however did show cardiomyopathy with ejection fraction 35 to 40%. She has a family history of heart failure, but no known personal history of heart failure or cardiomyopathy. She was started on guideline directed medical therapy with metoprolol succinate and lisinopril. Cardiology was consulted for recommendations for ischemic evaluation, and recommended following up in the office for possible stress test versus CTA coronary arteries versus left heart catheterization. Stable for discharge home with outpatient follow-up with primary care physician, cardiology, and stroke clinic. She also inquired about primary care within the [facility] system, and a list of primary care physicians and offices was included with discharge papers. Discussed signs and symptoms that would warrant immediate medical attention including numbness, weakness, difficulty with speech, difficulty with swallowing, chest pain, shortness of breath, etc.  She states understanding. Discharge Exam:  Vitals:    04/26/22 1600 04/26/22 2030 04/27/22 0451 04/27/22 0839   BP: 103/82 107/68 105/80 (!) 126/93   Pulse: 77 80 85 78   Resp: 18 18 18    Temp: 98.7 °F (37.1 °C) 97.9 °F (36.6 °C) 98.3 °F (36.8 °C)    TempSrc: Oral Oral Oral    SpO2: 96% 97% 98%    Weight:       Height:           General Appearance: alert and oriented to person, place and time and in no acute distress  Skin: warm and dry  Head: normocephalic and atraumatic  Eyes: pupils equal, round, and reactive to light, extraocular eye movements intact, conjunctivae normal  Neck: neck supple and non tender without mass   Pulmonary/Chest: Nonlabored on room air.   Clear to auscultation bilaterally  Cardiovascular: normal rate, normal S1 and S2 and no carotid bruits  Abdomen: soft, non-tender, non-distended, normal bowel sounds, no masses or organomegaly  Extremities: no cyanosis, no clubbing and no edema  Neurologic: no cranial nerve deficit and speech normal.  Muscle strength intact throughout. Sensation intact throughout. Reflexes normal.  No dysdiadochokinesia, ataxia, or dysmetria    I/O last 3 completed shifts: In: 0153 [P.O.:1070; I.V.:20]  Out: -   No intake/output data recorded. LABS:  Recent Labs     04/25/22  1010 04/26/22  0435    140   K 4.4 4.3    106   CO2 24 24   BUN 16 16   CREATININE 0.9 0.9   GLUCOSE 117* 89   CALCIUM 9.5 8.9       Recent Labs     04/25/22  1010 04/26/22  0435   WBC 5.5 6.0   RBC 4.61 4.34   HGB 14.5 13.7   HCT 44.0 40.7   MCV 95.4 93.8   MCH 31.5 31.6   MCHC 33.0 33.7   RDW 12.9 12.7    213   MPV 9.5 9.9       No results for input(s): POCGLU in the last 72 hours. Imaging:  Echo Complete    Result Date: 4/26/2022  Transthoracic Echocardiography Report (TTE)  Demographics   Patient Name        Effie Covert Gender               Female   Medical Record      [id number]     Room Number          6920  Number   Account #           [de-identified]    Procedure Date       04/26/2022   Corporate ID                     Ordering Physician   Accession Number    [id number]   Referring Physician   Date of Birth       1945   Sonographer          Nanette Torrez [facility]   Age                 65 year(s)   Interpreting         Yomaira Savage MD                                   Physician                                    Any Other  Procedure Type of Study   TTE procedure:Echo Complete W/Doppler & Color Flow. Procedure Date Date: 04/26/2022 Start: 08:53 AM Study Location: Echo Lab Technical Quality: Poor visualization due to poor acoustical window. Indications:CVA. Patient Status: Routine Height: 68 inches Weight: 124 pounds BSA: 1.67 m^2 BMI: 18.85 kg/m^2 BP: 121/68 mmHg  Findings   Left Ventricle  Normal left ventricular size. LV systolic function is moderately reduced. Ejection fraction is visually estimated at 35-40%. Abnormal diastolic function. No regional wall motion abnormalities seen. Mild left ventricular concentric hypertrophy noted. Right Ventricle  The right ventricle was not clearly visualized. Left Atrium  The left atrium is severely dilated. MADAI 84 ml/m2. The interatrial septum appears intact. Agitated saline injected for shunt evaluation; no obvious shunt but  visualization poor and can not be excluded. Right Atrium  Right Atrium is not clearly visualized. Mitral Valve  Moderate mitral annular calcification. Mild bileaflet mitral valve prolapse. No evidence of mitral valve stenosis. Mild mitral regurgitation. Tricuspid Valve  The tricuspid valve appears structurally normal.  Mild tricuspid regurgitation. RVSP is at least 23 mmHg. Aortic Valve  Individual aortic valve leaflets are not clearly visualized. The aortic valve is probably trileaflet. No hemodynamically significant aortic stenosis is present. No evidence of aortic valve regurgitation. Pulmonic Valve  The pulmonic valve was not well visualized. No evidence of pulmonic valve stenosis. No evidence of any pulmonic regurgitation. Pericardial Effusion  No evidence of pericardial effusion. Aorta  Aortic root dimension within normal limits. Miscellaneous  Normal Inferior Vena Cava diameter and respiratory variation. Conclusions   Summary  Technically difficult study - limited visualization due to breast  implants. Normal left ventricular size. LV systolic function is moderately reduced. Ejection fraction is visually estimated at 35-40%. Abnormal diastolic function. No regional wall motion abnormalities seen. Mild left ventricular concentric hypertrophy noted. The left atrium is severely dilated. Agitated saline injected for shunt evaluation; no obvious shunt but  visualization poor and can not be excluded.   Mild tricuspid regurgitation. Signature   ----------------------------------------------------------------  Electronically signed by Logan Maguire MD(Interpreting  physician) on 04/26/2022 05:02 PM  ----------------------------------------------------------------  M-Mode/2D Measurements & Calculations   LV Diastolic    LV Systolic Dimension: 3.5   AV Cusp Separation: 1.9 cmLA  Dimension: 4.2  cm                           Dimension: 4.2 cmAO Root  cm              LV Volume Diastolic: 36.3 ml Dimension: 3 cm  LV FS:16.7 %    LV Volume Systolic: 54.7 ml  LV PW           LV EDV/LV EDV Index: 24.3  Diastolic: 1.2  PH/59 IB/R^6XV ESV/LV ESV  cm              Index: 49.7 ml/30ml/ m^2     RV Diastolic Dimension: 2.6  LV PW Systolic: EF Calculated: 47.7 %        cm  1.3 cm          LV Mass Index: 113 l/min*m^2 RV Systolic Dimension: 1.7 cm  Septum          LV Length: 6 cm              LA/Aorta: 8.52  Diastolic: 1.3  cm              LVOT: 2 cm                   LA volume/Index: 139.9 ml  Septum  Systolic: 1.5  cm   LV Mass: 189.19  g  Doppler Measurements & Calculations   MV Peak E-Wave:   AV Peak Velocity: 0.99 m/s     LVOT Peak Velocity: 0.73  1.22 m/s          AV Peak Gradient: 3.93 mmHg    m/s  MV Peak A-Wave:   AV Mean Velocity: 0.72 m/s     LVOT Mean Velocity: 0.46  0.01 m/s          AV Mean Gradient: 2.3 mmHg     m/s  MV E/A Ratio:     AV VTI: 19 cm                  LVOT Peak Gradient: 2.1  203.33            AV Area (Continuity):2.43 cm^2 mmHgLVOT Mean Gradient: 1  MV Peak Gradient:                                mmHg  9.2 mmHg          LVOT VTI: 14.7 cm  MV Mean Gradient:  2.6 mmHg  MV Mean Velocity: Pulm. Vein A Reversal          TR Velocity:2.26 m/s  0.66 m/s          Duration:387.5 msec            TR Gradient:20.41 mmHg  MV Deceleration   Pulm. Vein D Velocity:0.34     PV Peak Velocity: 0.61  Time: 222.6 msec  m/sPulm.  Vein A Reversal       m/s  MV P1/2t: 61.9    Velocity:0.29 m/s              PV Peak Gradient: 1.49  msec Pulm. Vein S Velocity: 0.41    mmHg  MVA by PHT:3.55   m/s                            PV Mean Velocity: 0.46  cm^2                                             m/s  MV Area                                          PV Mean Gradient: 0.9  (continuity): 1.3                                mmHg  cm^2  [email or web address] [id number]    XR CHEST (2 VW)    Result Date: 4/25/2022  EXAMINATION: TWO XRAY VIEWS OF THE CHEST 4/25/2022 9:34 am COMPARISON: None. HISTORY: ORDERING SYSTEM PROVIDED HISTORY: tachycardia TECHNOLOGIST PROVIDED HISTORY: Reason for exam:->tachycardia FINDINGS: Borderline cardiomegaly. Normal pulmonary vascularity. Lungs are clear. Neither costophrenic angle is blunted. There is calcification of the mitral annulus. Borderline cardiomegaly and calcification at the mitral annulus. CT Head WO Contrast    Result Date: 4/25/2022  EXAMINATION: CT OF THE HEAD WITHOUT CONTRAST  4/25/2022 11:28 am TECHNIQUE: CT of the head was performed without the administration of intravenous contrast. Dose modulation, iterative reconstruction, and/or weight based adjustment of the mA/kV was utilized to reduce the radiation dose to as low as reasonably achievable. COMPARISON: None. HISTORY: ORDERING SYSTEM PROVIDED HISTORY: headache, atrial fibrillation TECHNOLOGIST PROVIDED HISTORY: Reason for exam:->headache, atrial fibrillation Has a \"code stroke\" or \"stroke alert\" been called? ->No Decision Support Exception - unselect if not a suspected or confirmed emergency medical condition->Emergency Medical Condition (MA) FINDINGS: BRAIN/VENTRICLES: There is no acute intracranial hemorrhage. Within the posterior right parietal lobe there is a 3.3 x 2.7 x 2.5 cm low attenuated focus. Significant surrounding edema is not appreciated. There is no mass effect. There is no effacement of the right lateral ventricle. There is no midline shift. The left cerebral hemisphere is unremarkable. The brainstem is unremarkable. ORBITS: The visualized portion of the orbits demonstrate no acute abnormality. SINUSES: The visualized paranasal sinuses and mastoid air cells demonstrate no acute abnormality. There is complete opacification of the left maxillary sinus. SOFT TISSUES/SKULL:  No acute abnormality of the visualized skull or soft tissues. 1. 3.3 x 2.7 x 2.5 cm abnormal area of low attenuation within the posterior right parietal lobe. This may represent a subacute infarct or possibly a mass. Significant surrounding vasogenic edema is not appreciated. There is no appreciable mass effect. Dedicated MRI within without IV contrast is recommended for further evaluation 2. There is no intracranial hemorrhage. MRA HEAD WO CONTRAST    Result Date: 4/26/2022  EXAMINATION: MRA OF THE HEAD WITHOUT CONTRAST; MRA OF THE NECK WITHOUT CONTRAST 4/26/2022 8:39 am TECHNIQUE: MRA of the head was performed utilizing time-of-flight imaging with MIP images. No intravenous contrast was administered.; Multiplanar multisequence MRA of the neck was performed without the administration of intravenous contrast. Stenosis of the internal carotid arteries measured using NASCET criteria. COMPARISON: MR brain 04/25/2022 HISTORY: ORDERING SYSTEM PROVIDED HISTORY: stroke FINDINGS: MRA HEAD: ANTERIOR CIRCULATION: No significant stenosis of the intracranial internal carotid, anterior cerebral, or middle cerebral arteries. POSTERIOR CIRCULATION: No significant stenosis of the vertebral, basilar, or posterior cerebral arteries. ANEURYSM: No intracranial aneurysm is seen. MRA NECK: AORTIC ARCH/ARCH VESSELS: Not included within the field of view. CAROTID ARTERIES: No dissection, arterial injury, or hemodynamically significant stenosis by NASCET criteria. VERTEBRAL ARTERIES: No dissection, arterial injury, or significant stenosis.      1. No apparent arterial high grade stenosis, occlusion or aneurysm within the head. 2. No significant stenosis within the cervical ICAs per NASCET criteria. Imaged portions of the cervical vertebral and common carotid arteries are unremarkable, however please note that the origins were not included within the field of view. 3. Please note that study is slightly suboptimal given nonvisualization of the arch vessel origins, which were not included within the field of view. MRA NECK WO CONTRAST    Result Date: 4/26/2022  EXAMINATION: MRA OF THE HEAD WITHOUT CONTRAST; MRA OF THE NECK WITHOUT CONTRAST 4/26/2022 8:39 am TECHNIQUE: MRA of the head was performed utilizing time-of-flight imaging with MIP images. No intravenous contrast was administered.; Multiplanar multisequence MRA of the neck was performed without the administration of intravenous contrast. Stenosis of the internal carotid arteries measured using NASCET criteria. COMPARISON: MR brain 04/25/2022 HISTORY: ORDERING SYSTEM PROVIDED HISTORY: stroke FINDINGS: MRA HEAD: ANTERIOR CIRCULATION: No significant stenosis of the intracranial internal carotid, anterior cerebral, or middle cerebral arteries. POSTERIOR CIRCULATION: No significant stenosis of the vertebral, basilar, or posterior cerebral arteries. ANEURYSM: No intracranial aneurysm is seen. MRA NECK: AORTIC ARCH/ARCH VESSELS: Not included within the field of view. CAROTID ARTERIES: No dissection, arterial injury, or hemodynamically significant stenosis by NASCET criteria. VERTEBRAL ARTERIES: No dissection, arterial injury, or significant stenosis. 1. No apparent arterial high grade stenosis, occlusion or aneurysm within the head. 2. No significant stenosis within the cervical ICAs per NASCET criteria. Imaged portions of the cervical vertebral and common carotid arteries are unremarkable, however please note that the origins were not included within the field of view.  3. Please note that study is slightly suboptimal given nonvisualization of the arch vessel origins, which were not included within the field of view. MRI BRAIN W WO CONTRAST    Result Date: 4/25/2022  EXAMINATION: MRI OF THE BRAIN WITHOUT AND WITH CONTRAST  4/25/2022 12:47 pm TECHNIQUE: Multiplanar multisequence MRI of the head/brain was performed without and with the administration of intravenous contrast. COMPARISON: CT head 04/25/2022 HISTORY: ORDERING SYSTEM PROVIDED HISTORY: abnormal CT findings TECHNOLOGIST PROVIDED HISTORY: Reason for exam:->abnormal CT findings Decision Support Exception - unselect if not a suspected or confirmed emergency medical condition->Emergency Medical Condition (MA) FINDINGS: There is restricted diffusion within the right middle cerebral artery territory. The largest area within the right temporoparietal region measures approximately 6.4 x 2.6 cm in size. There is a small focus of restricted diffusion in the high right frontoparietal cortex which measures approximately 5 mm in size. There are tiny foci of restricted diffusion along the right corona radiata. There is restricted diffusion at the right insular cortex. There is associated effacement of the right temporoparietal sulci. There are no additional areas of abnormal signal intensity within the brain parenchyma. There is no evidence of mass or midline shift. The ventricles are normal size and configuration. No extra-axial fluid collections or acute hemorrhage. There are no areas of abnormal contrast enhancement. Normal flow voids are noted within the vessels at the base of the brain. There is complete opacification of left maxillary sinus. The remaining visualized paranasal sinuses and mastoid air cells are clear. There are bilateral lens replacements. Acute ischemia in the right middle cerebral artery territory.      US CAROTID ARTERY BILATERAL    Result Date: 4/26/2022  EXAMINATION: ULTRASOUND EVALUATION OF THE CAROTID ARTERIES 4/26/2022 TECHNIQUE: Duplex ultrasound using B-mode/gray scaled imaging, Doppler spectral analysis and color flow Doppler was obtained of the carotid arteries. COMPARISON: None. HISTORY: ORDERING SYSTEM PROVIDED HISTORY: stroke TECHNOLOGIST PROVIDED HISTORY: Reason for exam:->stroke What reading provider will be dictating this exam?->CRC FINDINGS: RIGHT: The right common carotid artery demonstrates peak systolic velocities of 76 and 73 cm/sec in the proximal and distal segments respectively. The right internal carotid artery demonstrates the systolic velocities of 777, 108, 145  cm/sec in the proximal, mid and distal segments respectively. The external carotid artery is patent. The vertebral artery demonstrates normal antegrade flow. No evidence of focal atherosclerotic plaque. ICA/CCA ratio of 2.0 LEFT: The left common carotid artery demonstrates peak systolic velocities of 075 and 56 cm/sec in the proximal and distal segments respectively. The left internal carotid artery demonstrates the systolic velocities of 51, 108, 104 cm/sec in the proximal, mid and distal segments respectively. The external carotid artery is patent. The vertebral artery demonstrates normal antegrade flow. No evidence of focal atherosclerotic plaque. ICA/CCA ratio of 1.9     The right internal carotid artery demonstrates 0-50% stenosis. The left internal carotid artery demonstrates 0-50% stenosis. Bilateral vertebral arteries are patent with flow in the normal direction.          Patient Instructions:   Current Discharge Medication List      START taking these medications    Details   atorvastatin (LIPITOR) 40 MG tablet Take 1 tablet by mouth nightly  Qty: 30 tablet, Refills: 0      lisinopril (PRINIVIL;ZESTRIL) 5 MG tablet Take 1 tablet by mouth daily  Qty: 30 tablet, Refills: 0      metoprolol succinate (TOPROL XL) 25 MG extended release tablet Take 0.5 tablets by mouth daily  Qty: 30 tablet, Refills: 0      aspirin 81 MG chewable tablet Take 1 tablet by mouth daily  Qty: 30 tablet, Refills: 0         CONTINUE these medications which have NOT CHANGED    Details   Multiple Vitamins-Minerals (THERAPEUTIC MULTIVITAMIN-MINERALS) tablet Take 1 tablet by mouth daily      Cholecalciferol (VITAMIN D3) 50 MCG (2000 UT) CAPS Take by mouth Patient unsure if this is the amount      levothyroxine (SYNTHROID) 125 MCG tablet Take 125 mcg by mouth Daily         STOP taking these medications       Naproxen Sodium (ALEVE PO) Comments:   Reason for Stopping:                 Note that greater than 30 minutes was spent in preparing discharge papers, discussing discharge with patient, medication review, etc.    NOTE: This report was transcribed using voice recognition software. Every effort was made to ensure accuracy; however, inadvertent computerized transcription errors may be present.      Signed:  Electronically signed by Placido Lamb DO on 4/27/2022 at 9:47 AM

## 2022-04-27 NOTE — PROGRESS NOTES
S: no complaints  Objective:      Neuro exam 106/80 p 82 t 98  General: normal orientation and alertness. Cranial nerve testing was normal. Visual fields full to confrontation  Funduscopic eye exam revealed not testable. Motor exam: normal strength and muscle mass. Deep tendon reflexes were 1+ bilaterally. Plantar responses were flexor bilaterally. Cerebellar exam noted finger to nose without dysmetria. Sensation was normal to joint position sense, light touch and a pin prick . .        Assessment:   Probable right mca branch cardioembolic stroke secondary to newly diagnosed atrial fibrillation  MRA incomplete but essentially negative  Carotid us negative        Plan:     Cardiology evaluation in progress  Recommend waiting approximately 2 weeks before anticoagulating to reduce risk of hemorrhagic conversion  Recommend repeat mri of brain in 2 months to rule out alternative explanations such as neoplasm (headache unusual for stroke)  Statin (lipid profile excellent--defer to pcp any adjustments)  Once anticoagulation started, from neurology viewpoint, would not combine with asa  Follow up with stroke clinic [facility] neurology [place]  Sign off.  Thanks for consult

## 2022-04-28 ENCOUNTER — TELEPHONE (OUTPATIENT)
Dept: ADMINISTRATIVE | Age: 77
End: 2022-04-28

## 2022-05-20 ENCOUNTER — OFFICE VISIT (OUTPATIENT)
Dept: CARDIOLOGY CLINIC | Age: 77
End: 2022-05-20
Payer: MEDICARE

## 2022-05-20 VITALS
SYSTOLIC BLOOD PRESSURE: 126 MMHG | RESPIRATION RATE: 16 BRPM | BODY MASS INDEX: 18.7 KG/M2 | HEIGHT: 68 IN | HEART RATE: 75 BPM | DIASTOLIC BLOOD PRESSURE: 76 MMHG | WEIGHT: 123.4 LBS

## 2022-05-20 DIAGNOSIS — I48.91 NEW ONSET ATRIAL FIBRILLATION (HCC): Primary | ICD-10-CM

## 2022-05-20 DIAGNOSIS — I42.9 CARDIOMYOPATHY, UNSPECIFIED TYPE (HCC): ICD-10-CM

## 2022-05-20 DIAGNOSIS — I50.42 CHRONIC COMBINED SYSTOLIC AND DIASTOLIC CONGESTIVE HEART FAILURE (HCC): ICD-10-CM

## 2022-05-20 PROCEDURE — 99215 OFFICE O/P EST HI 40 MIN: CPT | Performed by: INTERNAL MEDICINE

## 2022-05-20 PROCEDURE — 93000 ELECTROCARDIOGRAM COMPLETE: CPT | Performed by: INTERNAL MEDICINE

## 2022-05-20 RX ORDER — METOPROLOL SUCCINATE 25 MG/1
25 TABLET, EXTENDED RELEASE ORAL DAILY
Qty: 30 TABLET | Refills: 3 | Status: SHIPPED
Start: 2022-05-20 | End: 2022-06-06 | Stop reason: SDUPTHER

## 2022-05-20 NOTE — PROGRESS NOTES
OUTPATIENT CARDIOLOGY FOLLOW-UP    Name: Kate Bonilla    Age: 65 y.o. Primary Care Physician: Bennie Rosas MD    Date of Service: 2022    Chief Complaint:   Chief Complaint   Patient presents with    Atrial Fibrillation     HFU- Patient has no complaints. Interim History:   Here for hospital follow-up. Seen as a new patient at [facility] 2022, she presented with a headache and was found to have a right MCA stroke. Never had any neurologic deficits. She was found to be in new onset of atrial fibrillation of unknown duration to which the stroke was attributed. Echo showed EF of 35 to 40%. She was started on guideline directed medical therapy and presents today for follow-up. She is feeling well. She denies chest pain, shortness of breath, palpitations. She is very active, walks 5 miles at the mall daily and runs up the stairs. Has noticed a little bit more easy fatigability and occasional lightheadedness. She has not follow-up with the stroke center yet. She has not yet been started on anticoagulation, per recommendation of neurology was to wait several weeks. There is also recommendation to repeat MRI to rule out any malignancy given her unusual presentation of headache which is not common for stroke.     Review of Systems:   Negative except as scribed above    Past Medical History:  Past Medical History:   Diagnosis Date    Atrial fibrillation ([facility] 2022    hypothyroidism     Ischemic stroke ([facility] 2022       Past Surgical History:  Past Surgical History:   Procedure Laterality Date    BREAST ENHANCEMENT SURGERY         Family History:  Family History   Problem Relation Age of Onset    Stroke Mother     Atrial Fibrillation Mother     Pneumonia Father     Heart Failure Brother        Social History:  Social History     Tobacco Use    Smoking status: Former Smoker     Types: Cigarettes     Quit date:      Years since quittin.4    Smokeless tobacco: Never Used   Vaping Use    Vaping Use: Never used   Substance Use Topics    Alcohol use: Yes     Comment: occasionally    Drug use: Never        Allergies:  No Known Allergies    Current Medications:    Current Outpatient Medications:     apixaban (ELIQUIS) 5 MG TABS tablet, Take 1 tablet by mouth 2 times daily, Disp: 60 tablet, Rfl: 5    metoprolol succinate (TOPROL XL) 25 MG extended release tablet, Take 1 tablet by mouth daily, Disp: 30 tablet, Rfl: 3    atorvastatin (LIPITOR) 40 MG tablet, Take 1 tablet by mouth nightly, Disp: 30 tablet, Rfl: 0    lisinopril (PRINIVIL;ZESTRIL) 5 MG tablet, Take 1 tablet by mouth daily, Disp: 30 tablet, Rfl: 0    aspirin 81 MG chewable tablet, Take 1 tablet by mouth daily, Disp: 30 tablet, Rfl: 0    Multiple Vitamins-Minerals (THERAPEUTIC MULTIVITAMIN-MINERALS) tablet, Take 1 tablet by mouth daily, Disp: , Rfl:     Cholecalciferol (VITAMIN D3) 50 MCG (2000 UT) CAPS, Take by mouth Patient unsure if this is the amount, Disp: , Rfl:     levothyroxine (SYNTHROID) 125 MCG tablet, Take 125 mcg by mouth Daily, Disp: , Rfl:     Physical Exam:  /76   Pulse 75   Resp 16   Ht 5' 8\" (1.727 m)   Wt 123 lb 6.4 oz (56 kg)   BMI 18.76 kg/m²   Wt Readings from Last 3 Encounters:   05/20/22 123 lb 6.4 oz (56 kg)   04/25/22 124 lb (56.2 kg)   04/25/22 124 lb (56.2 kg)     Appearance: Slender well-appearing elderly female, looks younger than stated age, awake, alert and oriented x 3, no acute respiratory distress  Skin: Intact, no rash  Head: Normocephalic, atraumatic  Eyes: EOMI, no conjunctival erythema  ENMT: No pharyngeal erythema, MMM, no rhinorrhea  Neck: Supple, no elevated JVP, no carotid bruits  Lungs: Clear to auscultation bilaterally. No wheezes, rales, or rhonchi.   Cardiac: Irregular rhythm with a normal rate, +S1S2, no murmurs apparent  Abdomen: Soft, nontender, +bowel sounds  Extremities: Moves all extremities x 4, no lower extremity edema  Neurologic: No focal motor deficits apparent, normal mood and affect, alert and oriented x 3  Peripheral Pulses: Intact posterior tibial pulses bilaterally    Laboratory Tests:  Lab Results   Component Value Date    CREATININE 0.9 2022    BUN 16 2022     2022    K 4.3 2022     2022    CO2 24 2022     No results found for: MG  Lab Results   Component Value Date    WBC 6.0 2022    HGB 13.7 2022    HCT 40.7 2022    MCV 93.8 2022     2022     Lab Results   Component Value Date    ALT 18 2022    AST 25 2022    ALKPHOS 80 2022    BILITOT 0.7 2022     No results found for: CKTOTAL, CKMB, CKMBINDEX, TROPONINI  No results found for: INR, PROTIME  Lab Results   Component Value Date    TSH 0.260 (L) 2022     No results found for: LABA1C  No results found for: EAG  Lab Results   Component Value Date    CHOL 160 2022     Lab Results   Component Value Date    TRIG 90 2022     Lab Results   Component Value Date    HDL 55 2022     Lab Results   Component Value Date    LDLCALC 87 2022     Lab Results   Component Value Date    LABVLDL 18 2022     No results found for: CHOLHDLRATIO  No results for input(s): PROBNP in the last 72 hours. Cardiac Tests:  EC2022: Atrial fibrillation 75 bpm.  Left anterior fascicular block. Poor R wave progression.     Echocardiogram:   TTE 22   Normal left ventricular size.   LV systolic function is moderately reduced.   Ejection fraction is visually estimated at 35-40%.   Abnormal diastolic function.   No regional wall motion abnormalities seen.   Mild left ventricular concentric hypertrophy noted.   The left atrium is severely dilated.   Agitated saline injected for shunt evaluation; no obvious shunt but   visualization poor and can not be excluded.   Mild tricuspid regurgitation    Stress test:      Cardiac catheterization:     MRI brain encounter    The patient's current medication list, allergies, problem list and results of all previously ordered testing were reviewed at today's visit.     Eliazar Mcneill MD, [facility]

## 2022-05-24 RX ORDER — LISINOPRIL 5 MG/1
5 TABLET ORAL DAILY
Qty: 30 TABLET | Refills: 5 | Status: SHIPPED | OUTPATIENT
Start: 2022-05-24

## 2022-05-27 RX ORDER — ATORVASTATIN CALCIUM 40 MG/1
40 TABLET, FILM COATED ORAL NIGHTLY
Qty: 30 TABLET | Refills: 5 | Status: SHIPPED
Start: 2022-05-27 | End: 2022-06-24

## 2022-06-06 RX ORDER — METOPROLOL SUCCINATE 25 MG/1
25 TABLET, EXTENDED RELEASE ORAL DAILY
Qty: 90 TABLET | Refills: 3 | Status: SHIPPED | OUTPATIENT
Start: 2022-06-06

## 2022-06-07 ENCOUNTER — TELEPHONE (OUTPATIENT)
Dept: CARDIOLOGY CLINIC | Age: 77
End: 2022-06-07

## 2022-06-07 NOTE — TELEPHONE ENCOUNTER
Patient called stating that when she takes Eliquis (with food) she gets extremely nauseous. She is asking for an alternative to this medication. Please advise.

## 2022-06-14 ENCOUNTER — TELEPHONE (OUTPATIENT)
Dept: CARDIOLOGY | Age: 77
End: 2022-06-14

## 2022-06-14 NOTE — TELEPHONE ENCOUNTER
Talked with patient regarding scheduled stress test Thursday, 6/16/2022. Instructed to remain NPO after midnight. Avoid all caffeine products for 12hrs, ok to drink water & take morning meds. Covid pre-check list reviewed, also instructed to wear mask to appt. Questions answered to patients satisfaction.

## 2022-06-16 ENCOUNTER — HOSPITAL ENCOUNTER (OUTPATIENT)
Dept: CARDIOLOGY | Age: 77
Discharge: HOME | End: 2022-06-16
Payer: MEDICARE

## 2022-06-16 VITALS
RESPIRATION RATE: 16 BRPM | SYSTOLIC BLOOD PRESSURE: 131 MMHG | HEART RATE: 73 BPM | BODY MASS INDEX: 18.64 KG/M2 | HEIGHT: 68 IN | WEIGHT: 123 LBS | DIASTOLIC BLOOD PRESSURE: 79 MMHG

## 2022-06-16 DIAGNOSIS — I42.9 CARDIOMYOPATHY, UNSPECIFIED TYPE (HCC): ICD-10-CM

## 2022-06-16 DIAGNOSIS — I48.91 NEW ONSET ATRIAL FIBRILLATION (HCC): ICD-10-CM

## 2022-06-16 PROCEDURE — 3430000000 HC RX DIAGNOSTIC RADIOPHARMACEUTICAL: Performed by: INTERNAL MEDICINE

## 2022-06-16 PROCEDURE — 93018 CV STRESS TEST I&R ONLY: CPT | Performed by: INTERNAL MEDICINE

## 2022-06-16 PROCEDURE — 93016 CV STRESS TEST SUPVJ ONLY: CPT | Performed by: INTERNAL MEDICINE

## 2022-06-16 PROCEDURE — 78452 HT MUSCLE IMAGE SPECT MULT: CPT

## 2022-06-16 PROCEDURE — 78452 HT MUSCLE IMAGE SPECT MULT: CPT | Performed by: INTERNAL MEDICINE

## 2022-06-16 PROCEDURE — 6360000002 HC RX W HCPCS: Performed by: INTERNAL MEDICINE

## 2022-06-16 PROCEDURE — 93017 CV STRESS TEST TRACING ONLY: CPT

## 2022-06-16 PROCEDURE — A9502 TC99M TETROFOSMIN: HCPCS | Performed by: INTERNAL MEDICINE

## 2022-06-16 PROCEDURE — 2580000003 HC RX 258: Performed by: INTERNAL MEDICINE

## 2022-06-16 RX ORDER — SODIUM CHLORIDE 0.9 % (FLUSH) 0.9 %
10 SYRINGE (ML) INJECTION PRN
Status: DISCONTINUED | OUTPATIENT
Start: 2022-06-16 | End: 2022-06-17 | Stop reason: HOSPADM

## 2022-06-16 RX ADMIN — SODIUM CHLORIDE, PRESERVATIVE FREE 10 ML: 5 INJECTION INTRAVENOUS at 09:46

## 2022-06-16 RX ADMIN — TETROFOSMIN 10.2 MILLICURIE: 0.23 INJECTION, POWDER, LYOPHILIZED, FOR SOLUTION INTRAVENOUS at 09:46

## 2022-06-16 RX ADMIN — SODIUM CHLORIDE, PRESERVATIVE FREE 10 ML: 5 INJECTION INTRAVENOUS at 11:06

## 2022-06-16 RX ADMIN — REGADENOSON 0.4 MG: 0.08 INJECTION, SOLUTION INTRAVENOUS at 11:05

## 2022-06-16 RX ADMIN — TETROFOSMIN 34.5 MILLICURIE: 0.23 INJECTION, POWDER, LYOPHILIZED, FOR SOLUTION INTRAVENOUS at 11:05

## 2022-06-16 NOTE — PROCEDURES
[address] [facility]. Suzy Giordano [address] [place] [address]  [phone]                 Pharmacologic Stress Nuclear Gated SPECT Study    Name: [facility] Account Number: [de-identified]    :  1945          Sex: female         Date of Study:  2022    Height: 5' 8\" (172.7 cm)         Weight: 123 lb (55.8 kg)     Ordering Provider: Margie Cabrera MD          PCP: Serina Lucia MD      Cardiologist: Margie Cabrera MD             Interpreting Physician: Callie Guido MD  _________________________________________________________________________________    Indication:   Detecting the presence and location of coronary artery disease    Clinical History:   Patient has no known history of coronary artery disease. Resting ECG:    Atrial fibrillation with septal infarct pattern and with non specific ST changes      Procedure:   Pharmacologic stress testing was performed with regadenoson 0.4 mg for 15 seconds. The heart rate was 73 at baseline and portia to 106 beats during the infusion. The blood pressure at baseline was 131/79 and blood pressure at the end of infusion was 121/83. Blood pressure response was normal during the stress procedure. The patient tolerated the infusion well. ECG during the infusion did not change. IMAGING: Myocardial perfusion imaging was performed at rest 30-35 minutes following the intravenous injection of 10.2 mCi of (Tc-tetrofosmin) followed by 10 ml of Normal Saline. As per infusion protocol, the patient was injected intravenously with 34.5 mCi of (Tc-tetrofosmin) followed by 10 ml of Normal Saline. Gated post-stress tomographic imaging was performed 45 minutes after stress. FINDINGS: The overall quality of the study was good. Left ventricular cavity size was noted to be normal.    Rotational analog analysis demonstrated soft tissue diaphragmatic attenuation.     A moderate defect was present in the basal inferoseptal wall(s) that was  small size on the post regadeson images          The resting images show no change. Gated SPECT left ventricular ejection fraction was calculated to be 51%, with normal myocardial thickening and wall motion. Impression:    1. Electrocardiographically normal regadenoson infusion with a clinically  non-ischemic response when compared to the baseline ECG  2. Myocardial perfusion imaging was normal with attenuation artifact. 3. Overall left ventricular systolic function was normal without regional wall motion abnormalities. 4. Low risk general pharmacologic stress test.    Thank you for sending your patient to this [employer] [facility].      Electronically signed by Resa Bumpers, MD on 6/16/2022 at 12:27 PM

## 2022-06-20 NOTE — RESULT ENCOUNTER NOTE
Stress test showed normal blood flow to the heart, and a low normal pumping function with EF 51%. EKG portion showed she was still in atrial fibrillation. If she is still feeling fatigued, I recommend proceeding with a XANDER guided cardioversion to restore normal rhythm. If she truly feels asymptomatic and cannot tell that she is out of rhythm, then a simple rate control strategy and leaving her in A. fib may also be reasonable, as long as she is protected from stroke with the blood thinner. She was supposed to have followed up with the stroke center to be cleared to initiate anticoagulation, prior to starting it  (she was also supposed to have a repeat MRI). If she is nauseous with apixaban, we can try her on rivaroxaban 15 mg daily (adjusted for renal function as creatinine clearance is 46 mL/min) once cleared by the stroke clinic to initiate anticoagulation. Otherwise continue same meds and follow-up as scheduled.

## 2022-06-21 NOTE — TELEPHONE ENCOUNTER
Contacted patient with stress results and recommendations per Dr. Maykel Harvey. Patient verbalized understanding. She indicates that she has been able to tolerate apixaban since she had follow-up with PCP who discovered that her liver enzymes were elevated and discontinued her cholesterol medications. She does wish to proceed with XANDER/DCCV and this will be scheduled with Dr. Maykel Harvey at [facility] the week of 6/27/2022.    ----- Message from Felton Zamora MD sent at 6/20/2022  5:48 PM EDT -----  Stress test showed normal blood flow to the heart, and a low normal pumping function with EF 51%. EKG portion showed she was still in atrial fibrillation. If she is still feeling fatigued, I recommend proceeding with a XANDER guided cardioversion to restore normal rhythm. If she truly feels asymptomatic and cannot tell that she is out of rhythm, then a simple rate control strategy and leaving her in A. fib may also be reasonable, as long as she is protected from stroke with the blood thinner. She was supposed to have followed up with the stroke center to be cleared to initiate anticoagulation, prior to starting it  (she was also supposed to have a repeat MRI). If she is nauseous with apixaban, we can try her on rivaroxaban 15 mg daily (adjusted for renal function as creatinine clearance is 46 mL/min) once cleared by the stroke clinic to initiate anticoagulation. Otherwise continue same meds and follow-up as scheduled.

## 2022-06-24 RX ORDER — ROSUVASTATIN CALCIUM 20 MG/1
20 TABLET, COATED ORAL DAILY
COMMUNITY
End: 2022-10-21

## 2022-06-24 NOTE — PROGRESS NOTES
Nat PRE-ADMISSION TESTING INSTRUCTIONS    The Preadmission Testing patient is instructed accordingly using the following criteria (check applicable):    ARRIVAL INSTRUCTIONS:  [x] Parking the day of Surgery is located in the Main Entrance lot. Upon entering the door, make an immediate right to the surgery reception desk    [x] Bring photo ID and insurance card    [x] Bring in a copy of Living will or Durable Power of  papers. [x] Please be sure to arrange for responsible adult to provide transportation to and from the hospital    [x] Please arrange for responsible adult to be with you for the 24 hour period post procedure due to having anesthesia    [x] If you awake am of surgery not feeling well or have temperature >100 please call [phone]    GENERAL INSTRUCTIONS:    [x] Nothing by mouth after midnight, including gum, candy, mints or water    [x] You may brush your teeth, but do not swallow any water    [x] Take medications as instructed with 1-2 oz of water    [x] Stop herbal supplements and vitamins 5 days prior to procedure    [x] Follow preop dosing of blood thinners per physician instructions    [] Take 1/2 dose of evening insulin, but no insulin after midnight    [] No oral diabetic medications after midnight    [] If diabetic and have low blood sugar or feel symptomatic, take 1-2oz apple juice only    [] Bring inhalers day of surgery    [] Bring C-PAP/ Bi-Pap day of surgery    [] Bring urine specimen day of surgery    [x] Shower or bath with soap, lather and rinse well, AM of Surgery, no lotion, powders or creams     [] Follow bowel prep as instructed per surgeon    [x] No tobacco products within 24 hours of surgery     [x] No alcohol or illegal drug use within 24 hours of surgery.     [x] Jewelry, body piercing's, eyeglasses, contact lenses and dentures are not permitted into surgery (bring cases)      [x] Please do not wear any nail polish, make up or hair products on the day of surgery    [x] You can expect a call the business day prior to procedure to notify you if your arrival time changes    [x] If you receive a survey after surgery we would greatly appreciate your comments    [] Parent/guardian of a minor must accompany their child and remain on the premises  the entire time they are under our care     [] Pediatric patients may bring favorite toy, blanket or comfort item with them    [] A caregiver or family member must remain with the patient during their stay if they are mentally handicapped, have dementia, disoriented or unable to use a call light or would be a safety concern if left unattended    [x] Please notify surgeon if you develop any illness between now and time of surgery (cold, cough, sore throat, fever, nausea, vomiting) or any signs of infections  including skin, wounds, and dental.    [x]  The Outpatient Pharmacy is available to fill your prescription here on your day of surgery, ask your preop nurse for details    [x] Other instructions: wear loose, comfortable clothing  EDUCATIONAL MATERIALS PROVIDED:    [] PAT Preoperative Education Packet/Booklet     [] Medication List    [] Transfusion bracelet applied with instructions    [] Shower with soap, lather and rinse well, and use CHG wipes provided the evening before surgery as instructed    [] Incentive spirometer with instructions

## 2022-06-28 ENCOUNTER — HOSPITAL ENCOUNTER (OUTPATIENT)
Dept: NON INVASIVE DIAGNOSTICS | Age: 77
Discharge: HOME | End: 2022-06-28
Payer: MEDICARE

## 2022-06-28 ENCOUNTER — ANESTHESIA (OUTPATIENT)
Dept: NON INVASIVE DIAGNOSTICS | Age: 77
End: 2022-06-28

## 2022-06-28 ENCOUNTER — ANESTHESIA EVENT (OUTPATIENT)
Dept: NON INVASIVE DIAGNOSTICS | Age: 77
End: 2022-06-28

## 2022-06-28 VITALS
HEART RATE: 68 BPM | RESPIRATION RATE: 16 BRPM | WEIGHT: 120 LBS | HEIGHT: 68 IN | OXYGEN SATURATION: 96 % | SYSTOLIC BLOOD PRESSURE: 123 MMHG | BODY MASS INDEX: 18.19 KG/M2 | TEMPERATURE: 96.8 F | DIASTOLIC BLOOD PRESSURE: 56 MMHG

## 2022-06-28 LAB
EKG ATRIAL RATE: 51 BPM
EKG P AXIS: 83 DEGREES
EKG P-R INTERVAL: 188 MS
EKG Q-T INTERVAL: 526 MS
EKG QRS DURATION: 112 MS
EKG QTC CALCULATION (BAZETT): 423 MS
EKG R AXIS: -49 DEGREES
EKG T AXIS: 19 DEGREES
EKG VENTRICULAR RATE: 39 BPM
LV EF: 48 %
LVEF MODALITY: NORMAL

## 2022-06-28 PROCEDURE — 6360000002 HC RX W HCPCS: Performed by: NURSE ANESTHETIST, CERTIFIED REGISTERED

## 2022-06-28 PROCEDURE — 7100000010 HC PHASE II RECOVERY - FIRST 15 MIN

## 2022-06-28 PROCEDURE — 93320 DOPPLER ECHO COMPLETE: CPT

## 2022-06-28 PROCEDURE — 3700000001 HC ADD 15 MINUTES (ANESTHESIA)

## 2022-06-28 PROCEDURE — 92960 CARDIOVERSION ELECTRIC EXT: CPT | Performed by: INTERNAL MEDICINE

## 2022-06-28 PROCEDURE — 93325 DOPPLER ECHO COLOR FLOW MAPG: CPT

## 2022-06-28 PROCEDURE — 2580000003 HC RX 258: Performed by: NURSE ANESTHETIST, CERTIFIED REGISTERED

## 2022-06-28 PROCEDURE — 3700000000 HC ANESTHESIA ATTENDED CARE

## 2022-06-28 PROCEDURE — 93005 ELECTROCARDIOGRAM TRACING: CPT

## 2022-06-28 PROCEDURE — 93325 DOPPLER ECHO COLOR FLOW MAPG: CPT | Performed by: INTERNAL MEDICINE

## 2022-06-28 PROCEDURE — 7100000011 HC PHASE II RECOVERY - ADDTL 15 MIN

## 2022-06-28 PROCEDURE — 93315 ECHO TRANSESOPHAGEAL: CPT | Performed by: INTERNAL MEDICINE

## 2022-06-28 PROCEDURE — 93312 ECHO TRANSESOPHAGEAL: CPT

## 2022-06-28 PROCEDURE — 93320 DOPPLER ECHO COMPLETE: CPT | Performed by: INTERNAL MEDICINE

## 2022-06-28 RX ORDER — PROCHLORPERAZINE EDISYLATE 5 MG/ML
5 INJECTION INTRAMUSCULAR; INTRAVENOUS
Status: CANCELLED | OUTPATIENT
Start: 2022-06-28 | End: 2022-06-28

## 2022-06-28 RX ORDER — SODIUM CHLORIDE 9 MG/ML
INJECTION, SOLUTION INTRAVENOUS PRN
Status: CANCELLED | OUTPATIENT
Start: 2022-06-28

## 2022-06-28 RX ORDER — FENTANYL CITRATE 50 UG/ML
25 INJECTION, SOLUTION INTRAMUSCULAR; INTRAVENOUS EVERY 5 MIN PRN
Status: CANCELLED | OUTPATIENT
Start: 2022-06-28

## 2022-06-28 RX ORDER — SODIUM CHLORIDE 0.9 % (FLUSH) 0.9 %
5-40 SYRINGE (ML) INJECTION PRN
Status: CANCELLED | OUTPATIENT
Start: 2022-06-28

## 2022-06-28 RX ORDER — LABETALOL HYDROCHLORIDE 5 MG/ML
5 INJECTION, SOLUTION INTRAVENOUS
Status: CANCELLED | OUTPATIENT
Start: 2022-06-28

## 2022-06-28 RX ORDER — HYDRALAZINE HYDROCHLORIDE 20 MG/ML
5 INJECTION INTRAMUSCULAR; INTRAVENOUS
Status: CANCELLED | OUTPATIENT
Start: 2022-06-28

## 2022-06-28 RX ORDER — SODIUM CHLORIDE 0.9 % (FLUSH) 0.9 %
5-40 SYRINGE (ML) INJECTION EVERY 12 HOURS SCHEDULED
Status: CANCELLED | OUTPATIENT
Start: 2022-06-28

## 2022-06-28 RX ORDER — PROPOFOL 10 MG/ML
INJECTION, EMULSION INTRAVENOUS PRN
Status: DISCONTINUED | OUTPATIENT
Start: 2022-06-28 | End: 2022-06-28 | Stop reason: SDUPTHER

## 2022-06-28 RX ORDER — SODIUM CHLORIDE 9 MG/ML
INJECTION, SOLUTION INTRAVENOUS CONTINUOUS PRN
Status: DISCONTINUED | OUTPATIENT
Start: 2022-06-28 | End: 2022-06-28 | Stop reason: SDUPTHER

## 2022-06-28 RX ORDER — DIPHENHYDRAMINE HYDROCHLORIDE 50 MG/ML
12.5 INJECTION INTRAMUSCULAR; INTRAVENOUS
Status: CANCELLED | OUTPATIENT
Start: 2022-06-28 | End: 2022-06-28

## 2022-06-28 RX ORDER — MEPERIDINE HYDROCHLORIDE 25 MG/ML
12.5 INJECTION INTRAMUSCULAR; INTRAVENOUS; SUBCUTANEOUS ONCE
Status: CANCELLED | OUTPATIENT
Start: 2022-06-28 | End: 2022-06-28

## 2022-06-28 RX ADMIN — PROPOFOL 250 MG: 10 INJECTION, EMULSION INTRAVENOUS at 07:51

## 2022-06-28 RX ADMIN — SODIUM CHLORIDE: 9 INJECTION, SOLUTION INTRAVENOUS at 07:32

## 2022-06-28 ASSESSMENT — PAIN - FUNCTIONAL ASSESSMENT: PAIN_FUNCTIONAL_ASSESSMENT: 0-10

## 2022-06-28 ASSESSMENT — LIFESTYLE VARIABLES: SMOKING_STATUS: 0

## 2022-06-28 NOTE — PROGRESS NOTES
Patient reverted back to atrial fibrillation with controlled rate. Has severe bileaflet prolapse but MR did not appear severe. Will arrange office follow up to discuss next steps which may include AAD + repeat [facility] attempt, cardiac MRI to better quantify MR, valve clinic eval, EP eval.    DC home on same meds.     Sofía Howard MD

## 2022-06-28 NOTE — ANESTHESIA POSTPROCEDURE EVALUATION
Department of Anesthesiology  Postprocedure Note    Patient: Rica James  MRN: [id number]  YOB: 1945  Date of evaluation: 6/28/2022      Procedure Summary     Date: 06/28/22 Room / Location: [facility] Echocardiography    Anesthesia Start: 0732 Anesthesia Stop: 6558    Procedure: TRANSESOPHAGEAL ECHO Diagnosis: Paroxysmal atrial fibrillation    Scheduled Providers:  Responsible Provider: Carrie Fernando DO    Anesthesia Type: MAC ASA Status: 3          Anesthesia Type: No value filed.     Home Phase I: Home Score: 10    Home Phase II:        Anesthesia Post Evaluation    Patient location during evaluation: bedside  Patient participation: complete - patient participated  Level of consciousness: awake and alert  Pain score: 0  Airway patency: patent  Nausea & Vomiting: no nausea and no vomiting  Complications: no  Cardiovascular status: blood pressure returned to baseline  Respiratory status: acceptable  Hydration status: euvolemic

## 2022-06-28 NOTE — H&P
H&P    Name: Francy Coughlin    Age: 65 y.o. Primary Care Physician: Erlinda Valle MD    Date of Service: 6/28/2022    Chief Complaint:   No chief complaint on file. Interim History:   Here for hospital follow-up. Seen as a new patient at [facility] 4/2022, she presented with a headache and was found to have a right MCA stroke. Never had any neurologic deficits. She was found to be in new onset of atrial fibrillation of unknown duration to which the stroke was attributed. Echo showed EF of 35 to 40%. She was started on guideline directed medical therapy and presents today for follow-up. She is feeling well. She denies chest pain, shortness of breath, palpitations. She is very active, walks [place] miles at the mall daily and runs up the stairs. Has noticed a little bit more easy fatigability and occasional lightheadedness. She has not follow-up with the stroke center yet. She has not yet been started on anticoagulation, per recommendation of neurology was to wait several weeks. There is also recommendation to repeat MRI to rule out any malignancy given her unusual presentation of headache which is not common for stroke. Update to H&P:  6/28/22: Here for XANDER/DCC. No new issues. Stress showed no ischemia and EF 51% remains fatigued.     Review of Systems:   Negative except as scribed above    Past Medical History:  Past Medical History:   Diagnosis Date    Arthritis     Atrial fibrillation ([address] 04/25/2022    Cardiomyopathy ([address]     new onset    Hyperlipidemia     Hypertension     hypothyroidism     Ischemic stroke ([address] 04/25/2022    pt denies defecits       Past Surgical History:  Past Surgical History:   Procedure Laterality Date    BREAST ENHANCEMENT SURGERY         Family History:  Family History   Problem Relation Age of Onset    Stroke Mother     Atrial Fibrillation Mother     Pneumonia Father     Heart Failure Brother        Social History:  Social History Tobacco Use    Smoking status: Former Smoker     Types: Cigarettes     Quit date:      Years since quittin.5    Smokeless tobacco: Never Used   Vaping Use    Vaping Use: Never used   Substance Use Topics    Alcohol use: Yes     Comment: occasionally    Drug use: Never        Allergies:  No Known Allergies    Current Medications:    Current Outpatient Medications:     rosuvastatin (CRESTOR) 20 MG tablet, Take 20 mg by mouth daily (Patient not taking: Reported on 2022), Disp: , Rfl:     metoprolol succinate (TOPROL XL) 25 MG extended release tablet, Take 1 tablet by mouth daily, Disp: 90 tablet, Rfl: 3    lisinopril (PRINIVIL;ZESTRIL) 5 MG tablet, Take 1 tablet by mouth daily, Disp: 30 tablet, Rfl: 5    apixaban (ELIQUIS) 5 MG TABS tablet, Take 1 tablet by mouth 2 times daily, Disp: 60 tablet, Rfl: 5    Multiple Vitamins-Minerals (THERAPEUTIC MULTIVITAMIN-MINERALS) tablet, Take 1 tablet by mouth daily, Disp: , Rfl:     Cholecalciferol (VITAMIN D3) 50 MCG (2000 UT) CAPS, Take by mouth Patient unsure if this is the amount, Disp: , Rfl:     levothyroxine (SYNTHROID) 125 MCG tablet, Take 125 mcg by mouth Daily, Disp: , Rfl:     Physical Exam:  BP (!) 140/61   Pulse 65   Temp 97.1 °F (36.2 °C)   Resp 20   Ht 5' 8\" (1.727 m)   Wt 120 lb (54.4 kg)   SpO2 97%   BMI 18.25 kg/m²   Wt Readings from Last 3 Encounters:   22 120 lb (54.4 kg)   22 123 lb (55.8 kg)   22 123 lb 6.4 oz (56 kg)     Appearance: Slender well-appearing elderly female, looks younger than stated age, awake, alert and oriented x 3, no acute respiratory distress  Skin: Intact, no rash  Head: Normocephalic, atraumatic  Eyes: EOMI, no conjunctival erythema  ENMT: No pharyngeal erythema, MMM, no rhinorrhea  Neck: Supple, no elevated JVP, no carotid bruits  Lungs: Clear to auscultation bilaterally. No wheezes, rales, or rhonchi.   Cardiac: Irregular rhythm with a normal rate, +S1S2, no murmurs apparent  Abdomen: Soft, nontender, +bowel sounds  Extremities: Moves all extremities x 4, no lower extremity edema  Neurologic: No focal motor deficits apparent, normal mood and affect, alert and oriented x 3  Peripheral Pulses: Intact posterior tibial pulses bilaterally    Laboratory Tests:  Lab Results   Component Value Date    CREATININE 0.9 2022    BUN 16 2022     2022    K 4.3 2022     2022    CO2 24 2022     No results found for: MG  Lab Results   Component Value Date    WBC 6.0 2022    HGB 13.7 2022    HCT 40.7 2022    MCV 93.8 2022     2022     Lab Results   Component Value Date    ALT 18 2022    AST 25 2022    ALKPHOS 80 2022    BILITOT 0.7 2022     No results found for: CKTOTAL, CKMB, CKMBINDEX, TROPONINI  No results found for: INR, PROTIME  Lab Results   Component Value Date    TSH 0.260 (L) 2022     No results found for: LABA1C  No results found for: EAG  Lab Results   Component Value Date    CHOL 160 2022     Lab Results   Component Value Date    TRIG 90 2022     Lab Results   Component Value Date    HDL 55 2022     Lab Results   Component Value Date    LDLCALC 87 2022     Lab Results   Component Value Date    LABVLDL 18 2022     No results found for: CHOLHDLRATIO  No results for input(s): PROBNP in the last 72 hours. Cardiac Tests:  EC2022: Atrial fibrillation 75 bpm.  Left anterior fascicular block. Poor R wave progression.     Echocardiogram:   TTE 22   Normal left ventricular size.   LV systolic function is moderately reduced.   Ejection fraction is visually estimated at 35-40%.   Abnormal diastolic function.   No regional wall motion abnormalities seen.   Mild left ventricular concentric hypertrophy noted.   The left atrium is severely dilated.   Agitated saline injected for shunt evaluation; no obvious shunt but   visualization poor and can not be excluded.   Mild tricuspid regurgitation    Stress test:      Cardiac catheterization:     MRI brain 4/25/2022  Impression   Acute ischemia in the right middle cerebral artery territory. Orders Placed This Encounter   Procedures    ECHO Transesophageal        Requested Prescriptions     Signed Prescriptions Disp Refills    apixaban (ELIQUIS) 5 MG TABS tablet 60 tablet 5     Sig: Take 1 tablet by mouth 2 times daily    metoprolol succinate (TOPROL XL) 25 MG extended release tablet 30 tablet 3     Sig: Take 1 tablet by mouth daily        ASSESSMENT / PLAN:  1. New onset likely nonischemic cardiomyopathy EF 35-40% by echo -> 51% by SPECT. Likely due to arrhythmia  2. Chronic heart failure with reduced ejection fraction. Euvolemic  3. Persistent atrial fibrillation, unknown duration. Diagnosed 4/2022. [id number] score 5  4. Right MCA stroke,  2/3820 suspected embolic due to AF  5. Hypothyroidism    Recommendations:    · XANDER/DCC  · Was previously counseled multiple times to follow up with [facility] stroke clinic prior to initiating anticoagulation and for repeat MRI as recommended during her hospitilization    Risks and benefits of XANDER cardioversion explained to patient, including but not limited to risk of esophageal perforation, CVA, failure to restore sinus rhythm, recurrence of atrial arrhythmias, respiratory problems, skin burns. She understands and agrees to proceed.     Kenna Chong MD, [facility]

## 2022-06-28 NOTE — PROCEDURES
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 Informed consent obtained from patient. Risks, benefits and alternatives to XANDER and DC cardioversion were explained to the patient in detail, and the patient acknowledged understanding. Cardiac rhythm, arterial oxygen saturation, and blood pressure were continuously monitored. Deep sedation with propofol administered by the Department of Anesthesiology. XANDER was performed and there was no evidence of LA or NEVA thrombus. See full XANDER report in Epic. After removal of the probe and verification of adequate sedation, direct current cardioversion was performed as described:    Patch placement: Anterior/posterior  Energy: 150 Joules, synchronized  Number of shocks: 1  Outcome: Sinus rhythm  Complications: None    Impression:  Successful XANDER-guided DC cardioversion of atrial fibrillation to normal sinus rhythm/sinus bradycardia.     Kwan Brown MD, [facility]

## 2022-06-28 NOTE — ANESTHESIA PRE PROCEDURE
Department of Anesthesiology  Preprocedure Note       Name:  Ramin Pardo   Age:  65 y.o.  :  1945                                          MRN:  [id number]         Date:  2022      Surgeon: * No surgeons listed *    Procedure: * No procedures listed *    Medications prior to admission:   Prior to Admission medications    Medication Sig Start Date End Date Taking?  Authorizing Provider   rosuvastatin (CRESTOR) 20 MG tablet Take 20 mg by mouth daily  Patient not taking: Reported on 2022    Historical Provider, MD   metoprolol succinate (TOPROL XL) 25 MG extended release tablet Take 1 tablet by mouth daily 22   Ryland Fleischer, MD   lisinopril (PRINIVIL;ZESTRIL) 5 MG tablet Take 1 tablet by mouth daily 22   Ryland Fleischer, MD   apixaban Sariah Sang) 5 MG TABS tablet Take 1 tablet by mouth 2 times daily 22   Ryland Fleischer, MD   Multiple Vitamins-Minerals (THERAPEUTIC MULTIVITAMIN-MINERALS) tablet Take 1 tablet by mouth daily    Historical Provider, MD   Cholecalciferol (VITAMIN D3) 50 MCG (2000) CAPS Take by mouth Patient unsure if this is the amount    Historical Provider, MD   levothyroxine (SYNTHROID) 125 MCG tablet Take 125 mcg by mouth Daily    Historical Provider, MD       Current medications:    Current Outpatient Medications   Medication Sig Dispense Refill    rosuvastatin (CRESTOR) 20 MG tablet Take 20 mg by mouth daily (Patient not taking: Reported on 2022)      metoprolol succinate (TOPROL XL) 25 MG extended release tablet Take 1 tablet by mouth daily 90 tablet 3    lisinopril (PRINIVIL;ZESTRIL) 5 MG tablet Take 1 tablet by mouth daily 30 tablet 5    apixaban (ELIQUIS) 5 MG TABS tablet Take 1 tablet by mouth 2 times daily 60 tablet 5    Multiple Vitamins-Minerals (THERAPEUTIC MULTIVITAMIN-MINERALS) tablet Take 1 tablet by mouth daily      Cholecalciferol (VITAMIN D3) 50 MCG (2000) CAPS Take by mouth Patient unsure if this is the amount      levothyroxine (SYNTHROID) 125 MCG tablet Take 125 mcg by mouth Daily       No current facility-administered medications for this encounter. 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    New onset atrial fibrillation (HCC) I48.91    Hypothyroidism E03.9    Cardiomyopathy ([facility] I42.9       Past Medical History:        Diagnosis Date    Arthritis     Atrial fibrillation ([facility] 2022    [facility]     new onset    Hyperlipidemia     Hypertension     hypothyroidism     [facility]schem[facility] 2022    pt denies defecits       Past Surgical History:        Procedure Laterality Date    BREAST ENHANCEMENT SURGERY         Social History:    Social History     Tobacco Use    Smoking status: Former Smoker     Types: Cigarettes     Quit date:      Years since quittin.    Smokeless tobacco: Never Used   Substance Use Topics    Alcohol use: Yes     Comment: occasionally                                Counseling given: Not Answered      Vital Signs (Current):   Vitals:    22 0936 22 0620   BP:  (!) 140/61   Pulse:  65   Resp:  20   Temp:  97.1 °F (36.2 °C)   SpO2:  97%   Weight: 120 lb (54.4 kg) 120 lb (54.4 kg)   Height: 5' 8\" (1.727 m) 5' 8\" (1.727 m)                                              BP Readings from Last 3 Encounters:   22 (!) 140/61   22 131/79   22 126/76       NPO Status:                                                                                 BMI:   Wt Readings from Last 3 Encounters:   22 120 lb (54.4 kg)   22 123 lb (55.8 kg)   22 123 lb 6.4 oz (56 kg)     Body mass index is 18.25 kg/m².     CBC:   Lab Results   Component Value Date    WBC 6.0 2022    RBC 4.34 2022    HGB 13.7 2022    HCT 40.7 2022    MCV 93.8 2022    RDW 12.7 2022     2022       CMP: Lab Results   Component Value Date     04/26/2022    K 4.3 04/26/2022    K 4.4 04/25/2022     04/26/2022    CO2 24 04/26/2022    BUN 16 04/26/2022    CREATININE 0.9 04/26/2022    GFRAA >60 04/26/2022    LABGLOM >60 04/26/2022    GLUCOSE 89 04/26/2022    PROT 6.3 04/26/2022    CALCIUM 8.9 04/26/2022    BILITOT 0.7 04/26/2022    ALKPHOS 80 04/26/2022    AST 25 04/26/2022    ALT 18 04/26/2022       POC Tests: No results for input(s): POCGLU, POCNA, POCK, POCCL, POCBUN, POCHEMO, POCHCT in the last 72 hours. Coags: No results found for: PROTIME, INR, APTT    HCG (If Applicable): No results found for: PREGTESTUR, PREGSERUM, HCG, HCGQUANT     ABGs: No results found for: PHART, PO2ART, ZPV9QPE, DDI5RGT, BEART, P3VPOUHJ     Type & Screen (If Applicable):  No results found for: LABABO, LABRH    Drug/Infectious Status (If Applicable):  No results found for: HIV, HEPCAB    COVID-19 Screening (If Applicable):   Lab Results   Component Value Date    COVID19 Not Detected 01/31/2022           Anesthesia Evaluation  Patient summary reviewed and Nursing notes reviewed no history of anesthetic complications:   Airway: Mallampati: III  TM distance: >3 FB   Neck ROM: full  Mouth opening: > = 3 FB   Dental:          Pulmonary:   (+) rales (R>L (fine)), bibasilar     (-) COPD, asthma, sleep apnea and not a current smoker                           Cardiovascular:  Exercise tolerance: good (>4 METS),   (+) hypertension: moderate and no interval change, CAD (Cardiomyopathy):, dysrhythmias: atrial fibrillation, murmur: Grade 2, hyperlipidemia    (-) past MI and  angina    ECG reviewed  Rhythm: irregular  Rate: normal  Echocardiogram reviewed         Beta Blocker:  Dose within 24 Hrs      ROS comment: EKG:  Atrial fibrillation   Left axis -anterior fascicular block. Poor R-wave progression -nonspecific -consider old anterior infarct. ABNORMAL    ECHO:  Normal left ventricular size.   LV systolic function is moderately reduced. Ejection fraction is visually estimated at 35-40%. Abnormal diastolic function. No regional wall motion abnormalities seen. Mild left ventricular concentric hypertrophy noted. The left atrium is severely dilated. Agitated saline injected for shunt evaluation; no obvious shunt but visualization poor and can not be excluded. Mild tricuspid regurgitation. Neuro/Psych:   (+) CVA (Right middle cerebral artery):,    (-) seizures           GI/Hepatic/Renal:        (-) hepatitis and no renal disease       Endo/Other:    (+) hypothyroidism, blood dyscrasia (Eliquis LD 6/28): anticoagulation therapy, arthritis: OA., . Pt had no PAT visit       Abdominal:         (-) obese       Vascular: negative vascular ROS. - DVT and PE. Other Findings:           Anesthesia Plan      MAC     ASA 3       Induction: intravenous. MIPS: Prophylactic antiemetics administered. Anesthetic plan and risks discussed with patient. Plan discussed with VIJAY. Nate Blair DO   6/28/2022      Patient seen and chart reviewed. No interval change in history or exam.   Anesthesia plan discussed, risk/benefits addressed. Patient's concerns and questions answered.      Cade Gonzalez, APRN - CRNA  June 28, 2022  6:45 AM

## 2022-07-21 ENCOUNTER — OFFICE VISIT (OUTPATIENT)
Dept: CARDIOLOGY CLINIC | Age: 77
End: 2022-07-21
Payer: MEDICARE

## 2022-07-21 VITALS
WEIGHT: 119 LBS | RESPIRATION RATE: 14 BRPM | DIASTOLIC BLOOD PRESSURE: 70 MMHG | SYSTOLIC BLOOD PRESSURE: 112 MMHG | BODY MASS INDEX: 18.04 KG/M2 | HEIGHT: 68 IN | HEART RATE: 68 BPM

## 2022-07-21 DIAGNOSIS — I42.9 CARDIOMYOPATHY, UNSPECIFIED TYPE (HCC): ICD-10-CM

## 2022-07-21 DIAGNOSIS — I34.0 NONRHEUMATIC MITRAL VALVE REGURGITATION: ICD-10-CM

## 2022-07-21 DIAGNOSIS — I50.42 CHRONIC COMBINED SYSTOLIC AND DIASTOLIC CONGESTIVE HEART FAILURE (HCC): ICD-10-CM

## 2022-07-21 DIAGNOSIS — I48.19 PERSISTENT ATRIAL FIBRILLATION (HCC): Primary | ICD-10-CM

## 2022-07-21 DIAGNOSIS — I34.1 MITRAL VALVE PROLAPSE: ICD-10-CM

## 2022-07-21 PROCEDURE — 1124F ACP DISCUSS-NO DSCNMKR DOCD: CPT | Performed by: INTERNAL MEDICINE

## 2022-07-21 PROCEDURE — 99214 OFFICE O/P EST MOD 30 MIN: CPT | Performed by: INTERNAL MEDICINE

## 2022-07-21 PROCEDURE — 93000 ELECTROCARDIOGRAM COMPLETE: CPT | Performed by: INTERNAL MEDICINE

## 2022-07-21 NOTE — PROGRESS NOTES
OUTPATIENT CARDIOLOGY FOLLOW-UP    Name: Anusha Weinberg    Age: 65 y.o. Primary Care Physician: Omaira Baxter MD    Date of Service: 7/21/2022    Chief Complaint:   Chief Complaint   Patient presents with    Dizziness    Chest Pain        Interim History:   Here for follow-up regarding atrial fibrillation and cardiomyopathy. Seen as a new patient at [facility] 4/[facility], she presented with a headache and was found to have a right MCA stroke. Never had any neurologic deficits. She was found to be in new onset of atrial fibrillation of unknown duration to which the stroke was attributed. Echo showed EF of 35 to 40%. 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  Cardioversion was initially successful and she went back into atrial fibrillation after 5 minutes. She is feeling well overall. Does note some increased exercise intolerance and occasional dizziness. She is still fairly active, mowing the lawn with a push mower. Few weeks ago had to take breaks when doing that but now is able to do without stopping. She denies chest pain, shortness of breath, lower extreme edema. She notes occasional palpitations.     Review of Systems:   Negative except as scribed above    Past Medical History:  Past Medical History:   Diagnosis Date    Arthritis     Atrial fibrillation ([address] 04/25/2022    Cardiomyopathy ([address]     new onset    Hyperlipidemia     Hypertension     hypothyroidism     Ischemic stroke ([facility] 04/25/2022    pt denies defecits       Past Surgical History:  Past Surgical History:   Procedure Laterality Date    BREAST ENHANCEMENT SURGERY         Family History:  Family History   Problem Relation Age of Onset    Stroke Mother     Atrial Fibrillation Mother     Pneumonia Father     Heart Failure Brother        Social History:  Social History     Tobacco Use    Smoking status: Former     Types: Cigarettes     Quit date:      Years since quittin.5    Smokeless tobacco: Never   Vaping Use    Vaping Use: Never used   Substance Use Topics    Alcohol use: Yes     Comment: occasionally    Drug use: Never        Allergies:  No Known Allergies    Current Medications:    Current Outpatient Medications:     rosuvastatin (CRESTOR) 20 MG tablet, Take 20 mg by mouth in the morning., Disp: , Rfl:     metoprolol succinate (TOPROL XL) 25 MG extended release tablet, Take 1 tablet by mouth daily, Disp: 90 tablet, Rfl: 3    lisinopril (PRINIVIL;ZESTRIL) 5 MG tablet, Take 1 tablet by mouth daily, Disp: 30 tablet, Rfl: 5    apixaban (ELIQUIS) 5 MG TABS tablet, Take 1 tablet by mouth 2 times daily, Disp: 60 tablet, Rfl: 5    Multiple Vitamins-Minerals (THERAPEUTIC MULTIVITAMIN-MINERALS) tablet, Take 1 tablet by mouth daily, Disp: , Rfl:     Cholecalciferol (VITAMIN D3) 50 MCG (2000 UT) CAPS, Take by mouth Patient unsure if this is the amount, Disp: , Rfl:     levothyroxine (SYNTHROID) 125 MCG tablet, Take 125 mcg by mouth Daily, Disp: , Rfl:     Physical Exam:  /70   Pulse 68   Resp 14   Ht 5' 8\" (1.727 m)   Wt 119 lb (54 kg)   BMI 18.09 kg/m²   Wt Readings from Last 3 Encounters:   22 119 lb (54 kg)   22 120 lb (54.4 kg)   22 123 lb (55.8 kg)     Appearance: Slender well-appearing elderly female, looks younger than stated age, awake, alert and oriented x 3, no acute respiratory distress  Skin: Intact, no rash  Head: Normocephalic, atraumatic  Eyes: EOMI, no conjunctival erythema  ENMT: No pharyngeal erythema, MMM, no rhinorrhea  Neck: Supple, no elevated JVP, no carotid bruits  Lungs: Clear to auscultation bilaterally. No wheezes, rales, or rhonchi.   Cardiac: Irregular rhythm with a normal rate, +S1S2, no murmurs apparent  Abdomen: Soft, nontender, +bowel sounds  Extremities: Moves all extremities x 4, no lower extremity edema  Neurologic: No focal motor deficits apparent, normal mood and affect, alert and oriented x 3  Peripheral Pulses: Intact posterior tibial pulses bilaterally    Laboratory Tests:  Lab Results   Component Value Date    CREATININE 0.9 2022    BUN 16 2022     2022    K 4.3 2022     2022    CO2 24 2022     No results found for: MG  Lab Results   Component Value Date    WBC 6.0 2022    HGB 13.7 2022    HCT 40.7 2022    MCV 93.8 2022     2022     Lab Results   Component Value Date    ALT 18 2022    AST 25 2022    ALKPHOS 80 2022    BILITOT 0.7 2022     No results found for: CKTOTAL, CKMB, CKMBINDEX, TROPONINI  No results found for: INR, PROTIME  Lab Results   Component Value Date    TSH 0.260 (L) 2022     No results found for: LABA1C  No results found for: EAG  Lab Results   Component Value Date    CHOL 160 2022     Lab Results   Component Value Date    TRIG 90 2022     Lab Results   Component Value Date    HDL 55 2022     Lab Results   Component Value Date    LDLCALC 87 2022     Lab Results   Component Value Date    LABVLDL 18 2022     No results found for: CHOLHDLRATIO  No results for input(s): PROBNP in the last 72 hours. Cardiac Tests:  EC2022: Atrial fibrillation 68 beats minute. Left anterior fascicular block. Poor R wave progression. Nonspecific ST changes. Echocardiogram:   XANDER 22   Summary   Ejection fraction is visually estimated at 45-50%. No evidence of thrombus within left atrium or appendage. Small PFO noted by color Doppler. 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 Severe bileaflet prolapse, most prominent at A3/P3. Moderate mitral regurgitation. TTE 22   Normal left ventricular size. LV systolic function is moderately reduced. Ejection fraction is visually estimated at 35-40%. Abnormal diastolic function.    No regional wall motion abnormalities seen. Mild left ventricular concentric hypertrophy noted. The left atrium is severely dilated. Agitated saline injected for shunt evaluation; no obvious shunt but   visualization poor and can not be excluded. Mild tricuspid regurgitation    Stress test:      Cardiac catheterization:     MRI brain 4/25/2022  Impression   Acute ischemia in the right middle cerebral artery territory. Orders Placed This Encounter   Procedures    MRI CARDIAC WO CONTRAST    EKG 12 lead        Requested Prescriptions      No prescriptions requested or ordered in this encounter        ASSESSMENT / PLAN:  New onset cardiomyopathy EF 35-40%. 45-50% by more recent XANDER. Possibly arrhythmia related. Stress negative for ischemia. Chronic heart failure with reduced ejection fraction. Euvolemic  Persistent atrial fibrillation, unknown duration. Diagnosed 4/2022. [id number] score 5  Unsuccessful XANDER/DCC attempt 6/28/2022 (return to A. fib after 5 minutes)  Severe bileaflet mitral valve prolapse/Pascal valve, with moderate MR  Right MCA stroke,  7/6109 suspected embolic due to AF  Hypothyroidism  Statin intolerance (atorvastatin and rosuvastatin)    Recommendations: Well compensated from cardiac standpoint. If has severe MR, then she has a class I indication for mitral valve surgery. If however the MR is only moderate which is how it appears on the XANDER, then the indication for surgery is less clear.     Obtain cardiac MRI with phase velocity mapping to quantify MR  Continue GDMT  Metoprolol succinate 25 mg daily  Lisinopril 5 mg daily  Currently no volume overload, diuretics as needed  Continue apixaban for stroke risk reduction  If MR is moderate by CMR, will refer to EP to discuss further efforts for rhythm control strategy  Encouraged her to follow-up with stroke center, scheduled in October [place] to continue exercise regimen  Notify me of any anginal or other cardiac symptoms  Intolerant to statins, consider

## 2022-07-22 ENCOUNTER — TELEPHONE (OUTPATIENT)
Dept: ADMINISTRATIVE | Age: 77
End: 2022-07-22

## 2022-07-22 NOTE — TELEPHONE ENCOUNTER
Contacted patient. Advised that order has been forwarded to [facility] for scheduling. She verbalized understanding.

## 2022-07-26 ENCOUNTER — TELEPHONE (OUTPATIENT)
Dept: CARDIOLOGY CLINIC | Age: 77
End: 2022-07-26

## 2022-07-26 NOTE — TELEPHONE ENCOUNTER
Patient called stating that she has been scheduled for her MRI at [facility]. However, she does state that she is claustrophobic and is asking for something to take prior to MRI to calm her. Please advise.

## 2022-08-14 ENCOUNTER — HOSPITAL ENCOUNTER (EMERGENCY)
Age: 77
Discharge: HOME | End: 2022-08-14
Payer: MEDICARE

## 2022-08-14 VITALS
SYSTOLIC BLOOD PRESSURE: 147 MMHG | OXYGEN SATURATION: 97 % | TEMPERATURE: 97.6 F | BODY MASS INDEX: 17.94 KG/M2 | WEIGHT: 118 LBS | RESPIRATION RATE: 16 BRPM | HEART RATE: 81 BPM | DIASTOLIC BLOOD PRESSURE: 66 MMHG

## 2022-08-14 DIAGNOSIS — N30.00 ACUTE CYSTITIS WITHOUT HEMATURIA: Primary | ICD-10-CM

## 2022-08-14 LAB
BACTERIA: ABNORMAL /HPF
BILIRUBIN URINE: NEGATIVE
BLOOD, URINE: ABNORMAL
CLARITY: ABNORMAL
COLOR: YELLOW
GLUCOSE URINE: NEGATIVE MG/DL
KETONES, URINE: NEGATIVE MG/DL
LEUKOCYTE ESTERASE, URINE: ABNORMAL
NITRITE, URINE: NEGATIVE
PH UA: 6.5 (ref 5–9)
PROTEIN UA: NEGATIVE MG/DL
RBC UA: ABNORMAL /HPF (ref 0–2)
SPECIFIC GRAVITY UA: 1.01 (ref 1–1.03)
UROBILINOGEN, URINE: 0.2 E.U./DL
WBC UA: >20 /HPF (ref 0–5)

## 2022-08-14 PROCEDURE — 99211 OFF/OP EST MAY X REQ PHY/QHP: CPT

## 2022-08-14 PROCEDURE — 81001 URINALYSIS AUTO W/SCOPE: CPT

## 2022-08-14 RX ORDER — CEPHALEXIN 500 MG/1
500 CAPSULE ORAL 2 TIMES DAILY
Qty: 14 CAPSULE | Refills: 0 | Status: SHIPPED | OUTPATIENT
Start: 2022-08-14 | End: 2022-08-21

## 2022-08-14 ASSESSMENT — PAIN - FUNCTIONAL ASSESSMENT: PAIN_FUNCTIONAL_ASSESSMENT: 0-10

## 2022-08-14 ASSESSMENT — PAIN DESCRIPTION - LOCATION: LOCATION: PERINEUM

## 2022-08-14 ASSESSMENT — PAIN DESCRIPTION - DESCRIPTORS: DESCRIPTORS: BURNING

## 2022-08-14 ASSESSMENT — PAIN SCALES - GENERAL: PAINLEVEL_OUTOF10: 7

## 2022-08-14 NOTE — ED PROVIDER NOTES
Department of Emergency [address] [facility]  Provider Note  Admit Date/Time: 8/14/2022 10:42 AM  Room: 02/02  MRN: [id number]  Chief Complaint: Urinary Burning (Pt has pain and burning on urination x 1 week)       History of Present Illness   Source of history provided by:  Patient. History/Exam Limitations: None. Raphael Perez is a 65 y.o. female with history of atrial fibrillation and cardiomyopathy. She reports a several day history of dysuria, frequency, and urgency which worsened over the last few days. Denies any abdominal, back, or flank pain. Denies any fevers, chills, nausea, or vomiting. Denies any recent catheterizations or instrumentation. She was concerned for a UTI so came in for evaluation. ROS    Pertinent positives and negatives are stated within HPI, all other systems reviewed and are negative. Past Surgical History:   Procedure Laterality Date    BREAST ENHANCEMENT SURGERY     Social History:  reports that she quit smoking about 30 years ago. Her smoking use included cigarettes. She has never used smokeless tobacco. She reports current alcohol use. She reports that she does not use drugs. Family History: family history includes Atrial Fibrillation in her mother; Heart Failure in her brother; Pneumonia in her father; Stroke in her mother. Allergies: Patient has no known allergies. Physical Exam   Oxygen Saturation Interpretation: Normal.   ED Triage Vitals [08/14/22 1043]   BP Temp Temp src Heart Rate Resp SpO2 Height Weight   (!) 147/66 97.6 °F (36.4 °C) -- 81 16 97 % -- 118 lb (53.5 kg)       Physical Exam  General: Vitals noted, no distress. Afebrile. Cardiac: Regular, rate, rhythm, no murmur. Pulmonary: Lungs clear bilaterally with good aeration. No adventitious breath sounds. Abdomen: Soft, nonsurgical. Nontender. No peritoneal signs. Normoactive bowel sounds. Back: No CVA tenderness bilaterally. Extremities: No peripheral edema. Negative Homans bilaterally, no cords. Neurovascularly intact throughout. Skin: No rash. Neuro: No gross neurologic deficits. Lab / Imaging Results   (All laboratory and radiology results have been personally reviewed by myself)  Labs:  Results for orders placed or performed during the hospital encounter of 08/14/22   Urinalysis   Result Value Ref Range    Color, UA Yellow Straw/Yellow    Clarity, UA CLOUDY (A) Clear    Glucose, Ur Negative Negative mg/dL    Bilirubin Urine Negative Negative    Ketones, Urine Negative Negative mg/dL    Specific Gravity, UA 1.015 1.005 - 1.030    Blood, Urine MODERATE (A) Negative    pH, UA 6.5 5.0 - 9.0    Protein, UA Negative Negative mg/dL    Urobilinogen, Urine 0.2 <2.0 E.U./dL    Nitrite, Urine Negative Negative    Leukocyte Esterase, Urine LARGE (A) Negative   Microscopic Urinalysis   Result Value Ref Range    WBC, UA >20 (A) 0 - 5 /HPF    RBC, UA 1-3 0 - 2 /HPF    Bacteria, UA MANY (A) None Seen /HPF     Imaging: All Radiology results interpreted by Radiologist unless otherwise noted. No orders to display       ED Course / Medical Decision Making   Medications - No data to display       Consult(s):   None    Procedure(s):   None    Differential Diagnosis: Is extensive but includes cystitis, hemorrhagic cystitis, pyelonephritis, ureterolithiasis, STI, as well as intra-abdominal pathologies such as appendicitis, diverticulitis, ovarian cyst/torsion, etc.    MDM:   This is a 65 y.o. female who presents with a several day history of urinary symptoms. On exam, she exhibits no CVA tenderness and has a nontender abdomen. UA shows a UTI. Patient exhibits no evidence of pyelonephritis nor ureterolithiasis. Will be home-going with the below medications. If applicable, I also discussed other etiologies of hematuria including bladder/ureteral/renal lesions and malignancy.  Discussed following up with the primary physician to ensure the hematuria has cleared following resolution of the acute infection. The patient understands this. Counseling: I discussed the differential, results and discharge plan with the patient and/or family/friend/caregiver if present. I emphasized the importance of follow-up with the physician I referred them to in the timeframe recommended. I explained reasons for the patient to return to the Emergency Department. Additional verbal discharge instructions were also given and discussed with the patient to supplement those generated by the EMR. We also discussed medications that were prescribed (if any) including common side effects and interactions. The patient was advised to abstain from driving, operating heavy machinery or making significant decisions while taking medications such as opiates and muscle relaxers that may impair this. All questions were addressed. They understand return precautions and discharge instructions. The patient and/or family/friend/caregiver expressed understanding. Assessment      1. Acute cystitis without hematuria      Plan   Discharge to home and advised to contact Omaira Baxter MD  [facility] [phone]      As needed   Patient condition is good    New Medications     New Prescriptions    CEPHALEXIN (KEFLEX) 500 MG CAPSULE    Take 1 capsule by mouth in the morning and 1 capsule before bedtime. Do all this for 7 days. Electronically signed by KRISTOPHER Yañez   DD: 8/14/22  **This report was transcribed using voice recognition software. Every effort was made to ensure accuracy; however, inadvertent computerized transcription errors may be present.   END OF ED PROVIDER NOTE          Kandice Ireland [address] [place], [place]  08/14/22 1108

## 2022-08-23 DIAGNOSIS — I34.0 NONRHEUMATIC MITRAL VALVE REGURGITATION: ICD-10-CM

## 2022-08-23 DIAGNOSIS — I34.1 MITRAL VALVE PROLAPSE: ICD-10-CM

## 2022-08-29 NOTE — RESULT ENCOUNTER NOTE
Cardiac MRI from [facility] [facility] clinic was consistent with moderate mitral regurgitation. Please refer to EP for atrial fibrillation to discuss AAD and repeat cardioversion. Will need to keep an eye on the mitral regurgitation and repeat an echo in 6 to 12 months. Follow-up as scheduled, notify if any new cardiac symptoms develop.

## 2022-08-30 ENCOUNTER — TELEPHONE (OUTPATIENT)
Dept: CARDIOLOGY CLINIC | Age: 77
End: 2022-08-30

## 2022-08-30 DIAGNOSIS — I48.19 PERSISTENT ATRIAL FIBRILLATION (HCC): Primary | ICD-10-CM

## 2022-08-30 NOTE — TELEPHONE ENCOUNTER
Left message for patient to contact office. ----- Message from Charito Tai MD sent at 8/29/2022  7:30 PM EDT -----  Cardiac MRI from [facility] was consistent with moderate mitral regurgitation. Please refer to EP for atrial fibrillation to discuss AAD and repeat cardioversion. Will need to keep an eye on the mitral regurgitation and repeat an echo in 6 to 12 months. Follow-up as scheduled, notify if any new cardiac symptoms develop.

## 2022-08-30 NOTE — TELEPHONE ENCOUNTER
Patient returned our call. She was given results and recommendations per Dr. April Thornton. She verbalized understanding. [facility]

## 2022-10-20 NOTE — PROGRESS NOTES
[address] [facility],[address] Floor and [address] [facility] Electrophysiology  Consultation Report  PATIENT: Viry Kamara  MEDICAL RECORD NUMBER: [id number]  DATE OF SERVICE:  10/21/2022  ATTENDING ELECTROPHYSIOLOGIST: Emilee Henry MD  REFERRING PHYSICIAN: Mounika Orta MD and Dawson Goldmann, MD  CHIEF COMPLAINT: Persistent atrial fibrillation and nonischemic cardiomyopathy    HPI: This is a 65 y.o. female with a history of   Patient Active Problem List   Diagnosis    Embolic stroke involving right middle cerebral artery ([address]    New onset atrial fibrillation ([address]    Hypothyroidism    Cardiomyopathy ([address]   who presents to cardiac electrophysiology clinic for consultation of persistent atrial fibrillation and nonischemic cardiomyopathy. The patient was seen by Dr. Bernard Martinez in April of 2022 when she presented to the hospital with headache and was found to have right MCA stroke and new onset AF of unknown duration. Echocardiogram on 4/26/22 showed LV EF of 35-40%, mild bileaflet mitral valve prolapse with mild MR and severe LAE. She subsequently underwent nuclear stress test 6/16/22 which showed low risk result and LV EF of 51%. She underwent XANDER and DC-CV on 6/28/22. 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 She had successful DC- Cardioversion but developed ERAF. The patient underwent cardiac MRI which showed moderate MR and LV EF of 42%. The patent reports feeling overall well but does report minor SOB but denies palpitations. The patient denies any chest pain, dizziness, syncope, orthopnea or paroxysmal nocturnal dyspnea. She presents today in AF with CVR. She is unaware of being out of rhythm. She remains on Toprol XL for rate control. We discussed about AF treatment options which include AAD therapy (Amiodarone versus Tikosyn versus Sotalol) with possible DC-cardioversion versus rate control strategy.  She opts for Amiodarone therapy. Patient Active Problem List    Diagnosis Date Noted    Cardiomyopathy ([facility] 2022     Priority: Medium    Embolic stroke involving right middle cerebral artery ([facility] 2022     Priority: Medium    New onset atrial fibrillation ([facility] 2022     Priority: Medium    Hypothyroidism 2022     Priority: Medium       Past Medical History:   Diagnosis Date    Arthritis     Atrial fibrillation ([facility] 2022    Cardiomyopathy ([facility]     new onset    Hyperlipidemia     Hypertension     hypothyroidism     Ischemic stroke ([facility] 2022    pt denies defecits       Family History   Problem Relation Age of Onset    Stroke Mother     Atrial Fibrillation Mother     Pneumonia Father     Heart Failure Brother        Social History     Tobacco Use    Smoking status: Former     Types: Cigarettes     Quit date:      Years since quittin.    Smokeless tobacco: Never   Substance Use Topics    Alcohol use: Yes     Comment: occasionally       Current Outpatient Medications   Medication Sig Dispense Refill    pravastatin (PRAVACHOL) 20 MG tablet take 1 tablet by mouth once daily      triamcinolone (KENALOG) 0.1 % cream APPLY TO TOPICALLY TO AFFECTED AREA TWO TIMES DAILY      metoprolol succinate (TOPROL XL) 25 MG extended release tablet Take 1 tablet by mouth daily 90 tablet 3    lisinopril (PRINIVIL;ZESTRIL) 5 MG tablet Take 1 tablet by mouth daily 30 tablet 5    apixaban (ELIQUIS) 5 MG TABS tablet Take 1 tablet by mouth 2 times daily 60 tablet 5    Multiple Vitamins-Minerals (THERAPEUTIC MULTIVITAMIN-MINERALS) tablet Take 1 tablet by mouth daily      Cholecalciferol (VITAMIN D3) 50 MCG ( UT) CAPS Take by mouth Patient unsure if this is the amount      levothyroxine (SYNTHROID) 125 MCG tablet Take 125 mcg by mouth Daily       No current facility-administered medications for this visit. No Known Allergies    ROS:   Constitutional: Negative for fever, activity change and appetite change. HENT: Negative for epistaxis. Eyes: Negative for diploplia, blurred vision. Respiratory: Negative for cough, chest tightness, shortness of breath and wheezing. Cardiovascular: pertinent positives in HPI  Gastrointestinal: Negative for abdominal pain and blood in stool. All other review of systems are negative     PHYSICAL EXAM:   Vitals:    10/21/22 1307   BP: 118/64   Pulse: 89   Resp: 18   Weight: 124 lb 3.2 oz (56.3 kg)   Height: 5' 8\" (1.727 m)      Constitutional: Well-developed, no acute distress  Eyes: conjunctivae normal, no xanthelasma   Ears, Nose, Throat: oral mucosa moist, no cyanosis   CV: no JVD. IRIR. No murmurs, rubs, or gallops. PMI is nondisplaced  Lungs: clear to auscultation bilaterally, normal respiratory effort without used of accessory muscles  Abdomen: soft, non-tender, bowel sounds present, no masses or hepatomegaly   Musculoskeletal: no digital clubbing, no edema   Skin: warm, no rashes     I have personally reviewed the laboratory, cardiac diagnostic and radiographic testing as outlined below:    Data:    No results for input(s): WBC, HGB, HCT, PLT in the last 72 hours. No results for input(s): NA, K, CL, CO2, BUN, CREATININE, GLU, CALCIUM in the last 72 hours. Invalid input(s): MAGNESIUM   No results found for: MG  No results for input(s): TSH in the last 72 hours. No results for input(s): INR in the last 72 hours. CXR 4/25/22:   FINDINGS:   Borderline cardiomegaly. Normal pulmonary vascularity. Lungs are clear. Neither costophrenic angle is blunted. There is calcification of the mitral   annulus. Impression   Borderline cardiomegaly and calcification at the mitral annulus. EKG 10/21/22 : AF, PVC rate: 89 bpm, Qtc 480 ms. Please see scan in Cardiology. Echocardiogram 4/26/22: Findings      Left Ventricle   Normal left ventricular size. LV systolic function is moderately reduced. Ejection fraction is visually estimated at 35-40%.    Abnormal diastolic function. No regional wall motion abnormalities seen. Mild left ventricular concentric hypertrophy noted. Right Ventricle   The right ventricle was not clearly visualized. Left Atrium   The left atrium is severely dilated. MADAI 84 ml/m2. The interatrial septum appears intact. Agitated saline injected for shunt evaluation; no obvious shunt but   visualization poor and can not be excluded. Right Atrium   Right Atrium is not clearly visualized. Mitral Valve   Moderate mitral annular calcification. Mild bileaflet mitral valve prolapse. No evidence of mitral valve stenosis. Mild mitral regurgitation. Tricuspid Valve   The tricuspid valve appears structurally normal.   Mild tricuspid regurgitation. RVSP is at least 23 mmHg. Aortic Valve   Individual aortic valve leaflets are not clearly visualized. The aortic valve is probably trileaflet. No hemodynamically significant aortic stenosis is present. No evidence of aortic valve regurgitation. Pulmonic Valve   The pulmonic valve was not well visualized. No evidence of pulmonic valve stenosis. No evidence of any pulmonic regurgitation. Pericardial Effusion   No evidence of pericardial effusion. Aorta   Aortic root dimension within normal limits. Miscellaneous   Normal Inferior Vena Cava diameter and respiratory variation. Conclusions      Summary   Technically difficult study - limited visualization due to breast   implants. Normal left ventricular size. LV systolic function is moderately reduced. Ejection fraction is visually estimated at 35-40%. Abnormal diastolic function. No regional wall motion abnormalities seen. Mild left ventricular concentric hypertrophy noted. The left atrium is severely dilated. Agitated saline injected for shunt evaluation; no obvious shunt but   visualization poor and can not be excluded. Mild tricuspid regurgitation.       Signature ----------------------------------------------------------------   Electronically signed by Zena Riddle MD(Interpreting   physician) on 04/26/2022 05:02 PM   ----------------------------------------------------------------    XANDER 6/28/22: Findings      Left Ventricle   Normal left ventricular size and systolic function. Ejection fraction is visually estimated at 45-50%. Right Ventricle   Normal right ventricular size and function. Left Atrium   Dilated left atrium. No evidence of thrombus within left atrium or appendage. Mild spontaneous echo contrast was present in LA appendage. Small PFO noted by color Doppler. Agitated saline injected for shunt evaluation. No shunting of bubbles. Right Atrium   Normal right atrial size. No evidence of thrombus or mass in the right atrium. 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 Severe bileaflet prolapse, most prominent at A3/P3. No evidence of mitral valve stenosis. Moderate mitral regurgitation. ERO 0.3 cm2. RV 48 mL. Tricuspid Valve   The tricuspid valve appears structurally normal.   Mild tricuspid regurgitation. Aortic Valve   Structurally normal aortic valve. The aortic valve is trileaflet. No hemodynamically significant aortic stenosis is present. Mild aortic valve regurgitation. Pulmonic Valve   Pulmonic valve is structurally normal.   Physiologic and/or trace pulmonic regurgitation present. Pericardial Effusion   No evidence of pericardial effusion. Aorta   Mild atherosclerotic disease of the descending thoracic aorta and arch. Miscellaneous   LUPV: Systolic blunting   LLPV: Systolic blunting   RUPV: Systolic blunting   RLPV: Systolic blunting      Conclusions      Summary   Ejection fraction is visually estimated at 45-50%. No evidence of thrombus within left atrium or appendage. Small PFO noted by color Doppler.    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 Severe bileaflet prolapse, most prominent at A3/P3. Moderate mitral regurgitation. Signature      ----------------------------------------------------------------   Electronically signed by Billy Salgado MD(Interpreting   physician) on 06/28/2022 01:47 PM   ----------------------------------------------------------------     Stress Test 6/16/22:   FINDINGS: The overall quality of the study was good. Left ventricular cavity size was noted to be normal.     Rotational analog analysis demonstrated soft tissue diaphragmatic attenuation. A moderate defect was present in the basal inferoseptal wall(s) that was  small size on the post regadeson images                                                                     The resting images show no change. Gated SPECT left ventricular ejection fraction was calculated to be 51%, with normal myocardial thickening and wall motion. Impression:    Electrocardiographically normal regadenoson infusion with a clinically  non-ischemic response when compared to the baseline ECG  Myocardial perfusion imaging was normal with attenuation artifact. Overall left ventricular systolic function was normal without regional wall motion abnormalities. 4. Low risk general pharmacologic stress test.     Thank you for sending your patient to this [employer]. Electronically signed by Jose Jewell MD on 6/16/2022 at 12:27 PM     Cardiac MRI 8/1/22: I have independently reviewed all of the ECGs and rhythm strips per above     Assessment/Plan: This is a 65 y.o. female with a history of   Patient Active Problem List   Diagnosis    Embolic stroke involving right middle cerebral artery ([address]    New onset atrial fibrillation ([address]    Hypothyroidism    Cardiomyopathy ([address]    who presents with persistent atrial fibrillation.     1. Persistent atrial fibrillation  - Diagnosed with new onset AF of unknown duration in April 2022.   - [id number] of 5  - Underwent XANDER and successful DC-CV on 6/28/22 with ERAF.   - On Toprol XL for rate control.  - On Eliquis for stroke risk reduction.  - Presents in AF with CVR. - We discussed about AF treatment options which include AAD therapy (Amiodarone versus Tikosyn versus Sotalol) with possible DC-cardioversion versus rate control strategy. She opts for Amiodarone therapy. - Education on importance of well controlled HTN (goal BP < 130/80), adequate weight control (goal BMI of < 27), physical activity consisting of moderate cardiopulmonary exercise up to a goal of 250 min/wk, daily compliance with CPAP in treating sleep apnea, smoking cessation and limited ETOH intake. 2. Nonischemic cardiomyopathy and chronic HFmrEF  - Echocardiogram on 4/26/22 showed LV EF of 35-40%. - Nuclear stress test 6/16/22 which showed low risk result and LV EF of 51%. - Cardiac MRI 8/1/22 showed LV EF of 42%. - Compensated and euvolemic.  - NYHA class II-III, ACC/AHA stage C.    3. Valvular heart disease  - Echocardiogram on 4/26/22 showed mild bileaflet mitral valve prolapse with mild MR.   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  - Cardiac MRI 8/1/22 showed moderate MR.    4. CVA  - Diagnosed with right MCA stroke in April 2022.  - On Eliquis. 5. Hyperlipidemia  - On Pravachol. 6. Hypothyroidism  - On Synthroid. Recommendations: We discussed about AF treatment options which include AAD therapy (Amiodarone versus Tikosyn versus Sotalol) with possible DC-cardioversion versus rate control strategy. She opts for Amiodarone therapy. Will start Amiodarone 200 mg bid for 2 weeks then 200 mg daily. EKG in 1 week. Will check baseline LFT and TFT. Will schedule for DC-cardioversion after 4 weeks of Amiodarone initiation. Reassess LV function and severity of MR after maintains NSR.  If her LV EF improves after NSR is maintained, would consider pursue AF ablation and wean off Amiodarone. Continue Toprol XL 25 mg daily. Continue Eliquis 5 mg bid. Follow up after the procedure or in 3 months. Encouraged the patient to call the office for any questions or concerns. I have spent a total of 60 minutes with the patient and the family reviewing the above stated recommendations. And a total of >50% of that time involved face-to-face time providing counseling and or coordination of care with the other providers, preparation for the clinic visit, reviewing records/tests, counseling/education of the patient, ordering medications/tests/procedures, coordinating care, and documenting clinical information in the EHR. Thank you for allowing me to participate in your patient's care. Please call me if there are any questions or concerns.       Maxx Farrell MD  Cardiac Electrophysiology  [address]  [facility] [place]: [facility]  1:25 PM  10/21/2022

## 2022-10-21 ENCOUNTER — OFFICE VISIT (OUTPATIENT)
Dept: CARDIOLOGY CLINIC | Age: 77
End: 2022-10-21
Payer: MEDICARE

## 2022-10-21 ENCOUNTER — OFFICE VISIT (OUTPATIENT)
Dept: NON INVASIVE DIAGNOSTICS | Age: 77
End: 2022-10-21
Payer: MEDICARE

## 2022-10-21 VITALS
HEIGHT: 68 IN | SYSTOLIC BLOOD PRESSURE: 118 MMHG | RESPIRATION RATE: 18 BRPM | HEART RATE: 89 BPM | WEIGHT: 124.2 LBS | BODY MASS INDEX: 18.82 KG/M2 | DIASTOLIC BLOOD PRESSURE: 64 MMHG

## 2022-10-21 VITALS
HEIGHT: 68 IN | HEART RATE: 72 BPM | WEIGHT: 122.6 LBS | SYSTOLIC BLOOD PRESSURE: 116 MMHG | BODY MASS INDEX: 18.58 KG/M2 | OXYGEN SATURATION: 99 % | RESPIRATION RATE: 16 BRPM | DIASTOLIC BLOOD PRESSURE: 60 MMHG

## 2022-10-21 DIAGNOSIS — I48.0 PAF (PAROXYSMAL ATRIAL FIBRILLATION) (HCC): Primary | ICD-10-CM

## 2022-10-21 DIAGNOSIS — I48.19 PERSISTENT ATRIAL FIBRILLATION (HCC): Primary | ICD-10-CM

## 2022-10-21 DIAGNOSIS — I34.1 MVP (MITRAL VALVE PROLAPSE): ICD-10-CM

## 2022-10-21 DIAGNOSIS — I48.0 PAF (PAROXYSMAL ATRIAL FIBRILLATION) (HCC): ICD-10-CM

## 2022-10-21 DIAGNOSIS — I34.0 NONRHEUMATIC MITRAL VALVE REGURGITATION: ICD-10-CM

## 2022-10-21 DIAGNOSIS — Z79.899 ON AMIODARONE THERAPY: ICD-10-CM

## 2022-10-21 DIAGNOSIS — I42.9 CARDIOMYOPATHY, UNSPECIFIED TYPE (HCC): ICD-10-CM

## 2022-10-21 DIAGNOSIS — I50.42 CHRONIC COMBINED SYSTOLIC AND DIASTOLIC CONGESTIVE HEART FAILURE (HCC): ICD-10-CM

## 2022-10-21 LAB
ALBUMIN SERPL-MCNC: 4.3 G/DL (ref 3.5–5.2)
ALP BLD-CCNC: 110 U/L (ref 35–104)
ALT SERPL-CCNC: 34 U/L (ref 0–32)
ANION GAP SERPL CALCULATED.3IONS-SCNC: 13 MMOL/L (ref 7–16)
AST SERPL-CCNC: 42 U/L (ref 0–31)
BILIRUB SERPL-MCNC: 0.4 MG/DL (ref 0–1.2)
BUN BLDV-MCNC: 27 MG/DL (ref 6–23)
CALCIUM SERPL-MCNC: 10 MG/DL (ref 8.6–10.2)
CHLORIDE BLD-SCNC: 103 MMOL/L (ref 98–107)
CO2: 26 MMOL/L (ref 22–29)
CREAT SERPL-MCNC: 1 MG/DL (ref 0.5–1)
GFR SERPL CREATININE-BSD FRML MDRD: 57 ML/MIN/1.73
GLUCOSE BLD-MCNC: 98 MG/DL (ref 74–99)
POTASSIUM SERPL-SCNC: 4.9 MMOL/L (ref 3.5–5)
SODIUM BLD-SCNC: 142 MMOL/L (ref 132–146)
T3 FREE: 2.8 PG/ML (ref 2–4.4)
T4 FREE: 1.87 NG/DL (ref 0.93–1.7)
TOTAL PROTEIN: 7.4 G/DL (ref 6.4–8.3)
TSH SERPL DL<=0.05 MIU/L-ACNC: 0.24 UIU/ML (ref 0.27–4.2)

## 2022-10-21 PROCEDURE — 99214 OFFICE O/P EST MOD 30 MIN: CPT | Performed by: INTERNAL MEDICINE

## 2022-10-21 PROCEDURE — 93000 ELECTROCARDIOGRAM COMPLETE: CPT | Performed by: INTERNAL MEDICINE

## 2022-10-21 PROCEDURE — 36415 COLL VENOUS BLD VENIPUNCTURE: CPT | Performed by: INTERNAL MEDICINE

## 2022-10-21 PROCEDURE — 1124F ACP DISCUSS-NO DSCNMKR DOCD: CPT | Performed by: INTERNAL MEDICINE

## 2022-10-21 PROCEDURE — 99205 OFFICE O/P NEW HI 60 MIN: CPT | Performed by: INTERNAL MEDICINE

## 2022-10-21 RX ORDER — TRIAMCINOLONE ACETONIDE 1 MG/G
CREAM TOPICAL
COMMUNITY
Start: 2022-08-05

## 2022-10-21 RX ORDER — AMIODARONE HYDROCHLORIDE 200 MG/1
200 TABLET ORAL DAILY
Qty: 90 TABLET | Refills: 1 | Status: SHIPPED | OUTPATIENT
Start: 2022-11-05

## 2022-10-21 RX ORDER — PRAVASTATIN SODIUM 20 MG
TABLET ORAL
COMMUNITY
Start: 2022-10-04

## 2022-10-21 RX ORDER — AMIODARONE HYDROCHLORIDE 200 MG/1
200 TABLET ORAL 2 TIMES DAILY
Qty: 28 TABLET | Refills: 0 | Status: SHIPPED | OUTPATIENT
Start: 2022-10-21 | End: 2022-11-04

## 2022-10-21 NOTE — PATIENT INSTRUCTIONS
Start Amiodarone 200 mg bid for 2 weeks then 200 mg daily. EKG in 1 week. If you are still in AF will schedule for cardioversion 3 weeks after.

## 2022-10-21 NOTE — PROGRESS NOTES
Lab work drawn today from Right Arm . Patient tolerated procedure well.     Electronically signed by Corrine Sicard, MA on 10/21/2022 at 1:55 PM

## 2022-10-21 NOTE — PROGRESS NOTES
OUTPATIENT CARDIOLOGY FOLLOW-UP    Name: Izabela Dunbar    Age: 65 y.o. Primary Care Physician: Leon Duran MD    Date of Service: 10/21/2022    Chief Complaint:   Chief Complaint   Patient presents with    Atrial Fibrillation        Interim History:   Here for follow-up regarding atrial fibrillation and cardiomyopathy. Seen as a new patient at [facility] 4/2022, she presented with a headache and was found to have a right MCA stroke. Never had any neurologic deficits. She was found to be in new onset of atrial fibrillation of unknown duration to which the stroke was attributed. Echo showed EF of 35 to 40%. I performed a XANDER and cardioversion, XANDER showed a myxomatous mitral valve with severe bileaflet prolapse/Pascal valve, but the MR only appeared moderate at worst.  Cardioversion was initially successful and she went back into atrial fibrillation after 5 minutes. Subsequent cardiac MRI confirmed moderate MR and I referred her to EP who she saw today, and plans are for amiodarone loading and cardioversion. She continues to feel very well. She goes to the mall every day and runs up the stairs for exercise. Gets a little short of breath at the top of steps but no restriction. Denies chest pain palpitations lower extremity edema.     Review of Systems:   Negative except as scribed above    Past Medical History:  Past Medical History:   Diagnosis Date    Arthritis     Atrial fibrillation ([address] 04/25/2022    Cardiomyopathy ([address]     new onset    Hyperlipidemia     Hypertension     hypothyroidism     Ischemic stroke ([facility] 04/25/2022    pt denies defecits       Past Surgical History:  Past Surgical History:   Procedure Laterality Date    BREAST ENHANCEMENT SURGERY         Family History:  Family History   Problem Relation Age of Onset    Stroke Mother     Atrial Fibrillation Mother     Pneumonia Father     Heart Failure Brother        Social History:  Social History     Tobacco Use Smoking status: Former     Packs/day: 1.00     Years: 15.00     Pack years: 15.00     Types: Cigarettes     Quit date: 12     Years since quittin.8     Passive exposure: Past    Smokeless tobacco: Never   Vaping Use    Vaping Use: Never used   Substance Use Topics    Alcohol use: Yes     Comment: occasionally    Drug use: Never        Allergies:  No Known Allergies    Current Medications:    Current Outpatient Medications:     pravastatin (PRAVACHOL) 20 MG tablet, take 1 tablet by mouth once daily, Disp: , Rfl:     triamcinolone (KENALOG) 0.1 % cream, APPLY TO TOPICALLY TO AFFECTED AREA TWO TIMES DAILY, Disp: , Rfl:     metoprolol succinate (TOPROL XL) 25 MG extended release tablet, Take 1 tablet by mouth daily, Disp: 90 tablet, Rfl: 3    lisinopril (PRINIVIL;ZESTRIL) 5 MG tablet, Take 1 tablet by mouth daily, Disp: 30 tablet, Rfl: 5    apixaban (ELIQUIS) 5 MG TABS tablet, Take 1 tablet by mouth 2 times daily, Disp: 60 tablet, Rfl: 5    Multiple Vitamins-Minerals (THERAPEUTIC MULTIVITAMIN-MINERALS) tablet, Take 1 tablet by mouth daily, Disp: , Rfl:     Cholecalciferol (VITAMIN D3) 50 MCG (2000 UT) CAPS, Take by mouth Patient unsure if this is the amount, Disp: , Rfl:     levothyroxine (SYNTHROID) 125 MCG tablet, Take 125 mcg by mouth Daily, Disp: , Rfl:     amiodarone (CORDARONE) 200 MG tablet, Take 1 tablet by mouth 2 times daily for 14 days (Patient not taking: Reported on 10/21/2022), Disp: 28 tablet, Rfl: 0    [START ON 2022] amiodarone (CORDARONE) 200 MG tablet, Take 1 tablet by mouth daily (Patient not taking: Reported on 10/21/2022), Disp: 90 tablet, Rfl: 1    Physical Exam:  /60   Pulse 72   Resp 16   Ht 5' 8\" (1.727 m)   Wt 122 lb 9.6 oz (55.6 kg)   SpO2 99%   BMI 18.64 kg/m²   Wt Readings from Last 3 Encounters:   10/21/22 122 lb 9.6 oz (55.6 kg)   10/21/22 124 lb 3.2 oz (56.3 kg)   22 118 lb (53.5 kg)     Appearance: Slender well-appearing elderly female, looks younger than stated age, awake, alert and oriented x 3, no acute respiratory distress  Skin: Intact, no rash  Head: Normocephalic, atraumatic  Eyes: EOMI, no conjunctival erythema  ENMT: No pharyngeal erythema, MMM, no rhinorrhea  Neck: Supple, no elevated JVP, no carotid bruits  Lungs: Clear to auscultation bilaterally. No wheezes, rales, or rhonchi. Cardiac: Irregular rhythm with a normal rate, +S1S2, no murmurs apparent  Abdomen: Soft, nontender, +bowel sounds  Extremities: Moves all extremities x 4, no lower extremity edema  Neurologic: No focal motor deficits apparent, normal mood and affect, alert and oriented x 3  Peripheral Pulses: Intact posterior tibial pulses bilaterally    Laboratory Tests:  Lab Results   Component Value Date    CREATININE 0.9 04/26/2022    BUN 16 04/26/2022     04/26/2022    K 4.3 04/26/2022     04/26/2022    CO2 24 04/26/2022     No results found for: MG  Lab Results   Component Value Date    WBC 6.0 04/26/2022    HGB 13.7 04/26/2022    HCT 40.7 04/26/2022    MCV 93.8 04/26/2022     04/26/2022     Lab Results   Component Value Date    ALT 18 04/26/2022    AST 25 04/26/2022    ALKPHOS 80 04/26/2022    BILITOT 0.7 04/26/2022     No results found for: CKTOTAL, CKMB, CKMBINDEX, TROPONINI  No results found for: INR, PROTIME  Lab Results   Component Value Date    TSH 0.260 (L) 04/25/2022     No results found for: LABA1C  No results found for: EAG  Lab Results   Component Value Date    CHOL 160 04/26/2022     Lab Results   Component Value Date    TRIG 90 04/26/2022     Lab Results   Component Value Date    HDL 55 04/26/2022     Lab Results   Component Value Date    LDLCALC 87 04/26/2022     Lab Results   Component Value Date    LABVLDL 18 04/26/2022     No results found for: CHOLHDLRATIO  No results for input(s): PROBNP in the last 72 hours. Cardiac Tests:  ECG:   10/21/2022: Atrial fibrillation 89 bpm with premature or aberrant complexes.   LAFB IVCD QRS duration 120 ms    Echocardiogram:   XANDER 6/28/22   Summary   Ejection fraction is visually estimated at 45-50%. No evidence of thrombus within left atrium or appendage. Small PFO noted by color Doppler. 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 Severe bileaflet prolapse, most prominent at A3/P3. Moderate mitral regurgitation. TTE 4/26/22   Normal left ventricular size. LV systolic function is moderately reduced. Ejection fraction is visually estimated at 35-40%. Abnormal diastolic function. No regional wall motion abnormalities seen. Mild left ventricular concentric hypertrophy noted. The left atrium is severely dilated. Agitated saline injected for shunt evaluation; no obvious shunt but   visualization poor and can not be excluded. Mild tricuspid regurgitation    Stress test:      Cardiac catheterization:     Cardiac MRI [facility] 8/1/2022        MRI brain 4/25/2022  Impression   Acute ischemia in the right middle cerebral artery territory. No orders of the defined types were placed in this encounter. Requested Prescriptions      No prescriptions requested or ordered in this encounter        ASSESSMENT / PLAN:  New onset cardiomyopathy EF 35-40%. 45-50% by more recent XANDER, 42% by CMR. Possibly arrhythmia related. Stress negative for ischemia. Chronic heart failure with mildly reduced ejection fraction. Euvolemic  Persistent atrial fibrillation, unknown duration. Diagnosed 4/2022. [id number] score 5  Unsuccessful XANDER/DCC attempt 6/28/2022 (return to A. fib after 5 minutes)  Severe bileaflet mitral valve prolapse/Pascal valve, with moderate MR  Right MCA stroke,  2/1636 suspected embolic due to AF  Hypothyroidism  Statin intolerance (atorvastatin and rosuvastatin), tolerating pravastatin    Recommendations: Well compensated from cardiac standpoint.   Rate well controlled at present    Continue GDMT  Metoprolol succinate 25 mg daily  Lisinopril 5 mg daily  Currently no volume overload, diuretics as needed  Continue apixaban for stroke risk reduction  EP input noted, plans are for amiodarone loading and repeat cardioversion  Encouraged her to follow-up with stroke center, scheduled in October [place] to continue exercise regimen  Notify me of any anginal or other cardiac symptoms  Continue pravastatin  Will need to reassess EF and MR after restoration of sinus rhythm  Aggressive risk factor modification  Follow-up in 3 months or sooner if need arises    The patient's current medication list, allergies, problem list and results of all previously ordered testing were reviewed at today's visit.     Orlando Marrero MD, [facility] Cardiology

## 2022-10-24 ENCOUNTER — TELEPHONE (OUTPATIENT)
Dept: NON INVASIVE DIAGNOSTICS | Age: 77
End: 2022-10-24

## 2022-10-24 NOTE — TELEPHONE ENCOUNTER
----- Message from Brigida Flores MD sent at 10/24/2022  7:16 AM EDT -----  She need to have repeat LFT in 1 week and need to discuss with PCP to decrease thyroid supplement as she is in hyperthyroidism state now which can cause worsening of AF.  Thanks.  ----- Message -----  From: Pam Borges Incoming Results From [employer]  Sent: 10/21/2022   6:57 PM EDT  To: Brigida Flores MD

## 2022-10-24 NOTE — TELEPHONE ENCOUNTER
Left a message for the patient to call the office back, so that we can review Dr. Alondra Sales recommendations. Awaiting call back.

## 2022-10-24 NOTE — TELEPHONE ENCOUNTER
Patient notified of results as per Dr. Dr. Susu Mullen. The patient verbalized understanding and will follow up with her PCP regarding the Synthroid dosing.

## 2022-10-28 ENCOUNTER — NURSE ONLY (OUTPATIENT)
Dept: NON INVASIVE DIAGNOSTICS | Age: 77
End: 2022-10-28
Payer: MEDICARE

## 2022-10-28 ENCOUNTER — TELEPHONE (OUTPATIENT)
Dept: NON INVASIVE DIAGNOSTICS | Age: 77
End: 2022-10-28

## 2022-10-28 DIAGNOSIS — I48.0 PAF (PAROXYSMAL ATRIAL FIBRILLATION) (HCC): ICD-10-CM

## 2022-10-28 DIAGNOSIS — I48.91 NEW ONSET ATRIAL FIBRILLATION (HCC): Primary | ICD-10-CM

## 2022-10-28 DIAGNOSIS — Z79.899 ON AMIODARONE THERAPY: ICD-10-CM

## 2022-10-28 DIAGNOSIS — I48.0 PAF (PAROXYSMAL ATRIAL FIBRILLATION) (HCC): Primary | ICD-10-CM

## 2022-10-28 LAB
ALBUMIN SERPL-MCNC: 4.2 G/DL (ref 3.5–5.2)
ALP BLD-CCNC: 97 U/L (ref 35–104)
ALT SERPL-CCNC: 32 U/L (ref 0–32)
AST SERPL-CCNC: 33 U/L (ref 0–31)
BILIRUB SERPL-MCNC: 0.5 MG/DL (ref 0–1.2)
BILIRUBIN DIRECT: <0.2 MG/DL (ref 0–0.3)
BILIRUBIN, INDIRECT: ABNORMAL MG/DL (ref 0–1)
T3 FREE: 2.1 PG/ML (ref 2–4.4)
T4 FREE: 1.9 NG/DL (ref 0.93–1.7)
TOTAL PROTEIN: 6.8 G/DL (ref 6.4–8.3)
TSH SERPL DL<=0.05 MIU/L-ACNC: 0.47 UIU/ML (ref 0.27–4.2)

## 2022-10-28 PROCEDURE — 36415 COLL VENOUS BLD VENIPUNCTURE: CPT | Performed by: INTERNAL MEDICINE

## 2022-10-28 PROCEDURE — 93000 ELECTROCARDIOGRAM COMPLETE: CPT | Performed by: INTERNAL MEDICINE

## 2022-10-28 NOTE — TELEPHONE ENCOUNTER
----- Message from Chi Padilla MD sent at 10/28/2022 12:34 PM EDT -----  CV 3 to 4 weeks post Amiodarone.  Thanks.  ----- Message -----  From: Etta Baldwin  Sent: 10/28/2022  11:50 AM EDT  To: Chi Padilla MD

## 2022-10-28 NOTE — TELEPHONE ENCOUNTER
I spoke Frank Garnett and she requested to have CV done at [facility]. I scheduled it with Dr. Debbie Rayo on 11/18/22 at 11:30. She was reminded to continue Eliquis twice daily and not miss any doses. She verbalized understanding.

## 2022-10-28 NOTE — PROGRESS NOTES
Pt seen in office today for blood work for LFTs and TFTs per Dr. Korey Givens. Pt tolerated blood draw from right hand.     Electronically signed by Anoop Salcido MA on 10/28/2022 at 11:57 AM

## 2022-10-28 NOTE — PROGRESS NOTES
The patient is here for 1 week EKG since starting Amiodarone. The patient said that her PCP decreased her thyroid medication on Wednesday her first day on it was Thursday. The patient had no complaints at current time. Tried to draw blood out of right arm. It was unsuccessful!     Electronically signed by Rajani Walker on 10/28/2022 at 11:53 AM

## 2022-11-08 ENCOUNTER — TELEPHONE (OUTPATIENT)
Dept: NON INVASIVE DIAGNOSTICS | Age: 77
End: 2022-11-08

## 2022-11-08 NOTE — TELEPHONE ENCOUNTER
Patient notified of recommendations as per Dr. Raul Cade. The patient verbalized understanding and states she wishes to continue on the Amiodarone for now.

## 2022-11-08 NOTE — TELEPHONE ENCOUNTER
Received a phone call from the patient stating that since starting the Amiodarone, she is having constipation. She reports her last BM was 11/6/2022. She has tried stool softeners with no relief. She states nothing else is new in her medication regiment besides the Amiodarone. Please advise.

## 2022-11-16 RX ORDER — LEVOTHYROXINE SODIUM 0.1 MG/1
TABLET ORAL
COMMUNITY
Start: 2022-10-26

## 2022-11-16 NOTE — PROGRESS NOTES
[address] [facility]                                                                                                                    PRE OP INSTRUCTIONS FOR  Georgiana Lockwood        Date: 11/16/2022    Date of surgery: 11/18/22   Arrival Time: Hospital will call you between 5pm and 7pm with your final arrival time for surgery    Do not eat or drink anything after midnight prior to surgery. This includes no water, chewing gum, mints or ice chips. Take the following medications with a small sip of water on the morning of Surgery: Metoprolol, Eliquis and Amiodarone     Diabetics may take evening dose of insulin but none after midnight. If you feel symptomatic or low blood sugar morning of surgery drink 1-2 ounces of apple juice only. Aspirin, Ibuprofen, Advil, Naproxen, Vitamin E and other Anti-inflammatory products should be stopped  before surgery  as directed by your physician. Take Tylenol only unless instructed otherwise by your surgeon. Check with your Doctor regarding stopping Plavix, Coumadin, Lovenox, Eliquis, Effient, or other blood thinners. Do not smoke,use illicit drugs and do not drink any alcoholic beverages 24 hours prior to surgery. You may brush your teeth the morning of surgery. DO NOT SWALLOW WATER    You MUST make arrangements for a responsible adult to take you home after your surgery. You will not be allowed to leave alone or drive yourself home. It is strongly suggested someone stay with you the first 24 hrs. Your surgery will be cancelled if you do not have a ride home. PEDIATRIC PATIENTS ONLY:  A parent/legal guardian must accompany a child scheduled for surgery and plan to stay at the hospital until the child is discharged. Please do not bring other children with you.     Please wear simple, loose fitting clothing to the hospital.  Trisha Ceja not bring valuables (money, credit cards, checkbooks, etc.) Do not wear any makeup (including no eye makeup) or nail polish on your fingers or toes. DO NOT wear any jewelry or piercings on day of surgery. All body piercing jewelry must be removed. Shower the night before surgery with _x__Antibacterial soap /JESSIE WIPES________    TOTAL JOINT REPLACEMENT/HYSTERECTOMY PATIENTS ONLY---Remember to bring Blood Bank bracelet to the hospital on the day of surgery. If you have a Living Will and Durable Power of  for Healthcare, please bring in a copy. If appropriate bring crutches, inspirex, WALKER, CANE etc... Notify your Surgeon if you develop any illness between now and surgery time, cough, cold, fever, sore throat, nausea, vomiting, etc.  Please notify your surgeon if you experience dizziness, shortness of breath or blurred vision between now & the time of your surgery. If you have ___dentures, they will be removed before going to the OR; we will provide you a container. If you wear ___contact lenses or ___glasses, they will be removed; please bring a case for them. To provide excellent care visitors will be limited to 2 in the room at any given time. Please bring picture ID and insurance card. Sleep apnea patients need to bring CPAP AND SETTINGS to hospital on day of surgery. During flu season no children under the age of 15 are permitted in the hospital for the safety of all patients. Other                   Please call AMBULATORY CARE if you have any further questions.    [facility]     [address]

## 2022-11-17 ENCOUNTER — ANESTHESIA EVENT (OUTPATIENT)
Dept: POSTOP/PACU | Age: 77
End: 2022-11-17

## 2022-11-17 ENCOUNTER — HOSPITAL ENCOUNTER (EMERGENCY)
Age: 77
Discharge: HOME | End: 2022-11-17
Payer: MEDICARE

## 2022-11-17 VITALS
HEART RATE: 58 BPM | TEMPERATURE: 97.2 F | OXYGEN SATURATION: 98 % | DIASTOLIC BLOOD PRESSURE: 59 MMHG | WEIGHT: 123 LBS | SYSTOLIC BLOOD PRESSURE: 149 MMHG | RESPIRATION RATE: 16 BRPM | BODY MASS INDEX: 18.7 KG/M2

## 2022-11-17 DIAGNOSIS — N30.01 ACUTE CYSTITIS WITH HEMATURIA: Primary | ICD-10-CM

## 2022-11-17 LAB
BACTERIA: ABNORMAL /HPF
BILIRUBIN URINE: NEGATIVE
BLOOD, URINE: ABNORMAL
CLARITY: ABNORMAL
COLOR: ABNORMAL
GLUCOSE URINE: NEGATIVE MG/DL
KETONES, URINE: NEGATIVE MG/DL
LEUKOCYTE ESTERASE, URINE: ABNORMAL
NITRITE, URINE: NEGATIVE
PH UA: 7 (ref 5–9)
PROTEIN UA: 100 MG/DL
RBC UA: >20 /HPF (ref 0–2)
SPECIFIC GRAVITY UA: 1.02 (ref 1–1.03)
UROBILINOGEN, URINE: 0.2 E.U./DL
WBC UA: ABNORMAL /HPF (ref 0–5)

## 2022-11-17 PROCEDURE — 87186 SC STD MICRODIL/AGAR DIL: CPT

## 2022-11-17 PROCEDURE — 87088 URINE BACTERIA CULTURE: CPT

## 2022-11-17 PROCEDURE — 99211 OFF/OP EST MAY X REQ PHY/QHP: CPT

## 2022-11-17 PROCEDURE — 81001 URINALYSIS AUTO W/SCOPE: CPT

## 2022-11-17 PROCEDURE — 87077 CULTURE AEROBIC IDENTIFY: CPT

## 2022-11-17 RX ORDER — CEFDINIR 300 MG/1
300 CAPSULE ORAL 2 TIMES DAILY
Qty: 14 CAPSULE | Refills: 0 | Status: SHIPPED | OUTPATIENT
Start: 2022-11-17 | End: 2022-11-24

## 2022-11-17 ASSESSMENT — PAIN SCALES - GENERAL: PAINLEVEL_OUTOF10: 8

## 2022-11-17 ASSESSMENT — PAIN DESCRIPTION - LOCATION: LOCATION: PERINEUM

## 2022-11-17 ASSESSMENT — LIFESTYLE VARIABLES: SMOKING_STATUS: 0

## 2022-11-17 ASSESSMENT — PAIN DESCRIPTION - DESCRIPTORS: DESCRIPTORS: BURNING

## 2022-11-17 ASSESSMENT — PAIN - FUNCTIONAL ASSESSMENT: PAIN_FUNCTIONAL_ASSESSMENT: 0-10

## 2022-11-17 NOTE — ED PROVIDER NOTES
[facility]  Department of Emergency Medicine  UC Encounter Note  22   3:18 PM EST      NAME: Sasha Gallego  :  1945  MRN:  [id number]    Chief Complaint: Urinary Frequency (With pain started this afternoon)      This is a 65year old female that presents to  with urinary frequency and dysuria that started today. She denies any fevers or chills or back pain. No chest pain or shortness of breath. On first contact patient she appears to be in no acute distress. Review of Systems  Pertinent positives and negatives are stated within HPI, all other systems reviewed and are negative. Physical Exam  Vitals and nursing note reviewed. Constitutional:       Appearance: She is well-developed. HENT:      Head: Normocephalic and atraumatic. Right Ear: Hearing and external ear normal.      Left Ear: Hearing and external ear normal.      Nose: Nose normal.      Mouth/Throat:      Pharynx: Uvula midline. Eyes:      General: Lids are normal.      Conjunctiva/sclera: Conjunctivae normal.      Pupils: Pupils are equal, round, and reactive to light. Cardiovascular:      Rate and Rhythm: Normal rate. Rhythm irregular. Heart sounds: Normal heart sounds. No murmur heard. Pulmonary:      Effort: Pulmonary effort is normal.      Breath sounds: Normal breath sounds. Abdominal:      General: Bowel sounds are normal.      Palpations: Abdomen is soft. Abdomen is not rigid. Tenderness: There is no abdominal tenderness. There is no right CVA tenderness, left CVA tenderness, guarding or rebound. Musculoskeletal:      Cervical back: Normal range of motion and neck supple. Skin:     General: Skin is warm and dry. Findings: No abrasion or rash. Neurological:      General: No focal deficit present. Mental Status: She is alert and oriented to person, place, and time. GCS: GCS eye subscore is 4. GCS verbal subscore is 5. GCS motor subscore is 6. Cranial Nerves: No cranial nerve deficit. Sensory: No sensory deficit. Coordination: Coordination normal.      Gait: Gait normal.       Procedures    MDM  Number of Diagnoses or Management Options  Acute cystitis with hematuria  Diagnosis management comments: Patient is in no acute distress. Labs are reviewed send for urine culture placed on antibiotic. Instructions given.             --------------------------------------------- PAST HISTORY ---------------------------------------------  Past Medical History:  has a past medical history of Arthritis, Atrial fibrillation ([facility] [address] Cardiomyopathy ([facility] Hyperlipidemia, Hypertension, hypothyroidism, and Ischemic stroke ([facility] Past Surgical History:  has a past surgical history that includes Breast enhancement surgery. Social History:  reports that she quit smoking about 30 years ago. Her smoking use included cigarettes. She has a 15.00 pack-year smoking history. She has been exposed to tobacco smoke. She has never used smokeless tobacco. She reports current alcohol use. She reports that she does not use drugs. Family History: family history includes Atrial Fibrillation in her mother; Heart Failure in her brother; Pneumonia in her father; Stroke in her mother. The patients home medications have been reviewed. Allergies: Patient has no known allergies.     -------------------------------------------------- RESULTS -------------------------------------------------  Results for orders placed or performed during the hospital encounter of 11/17/22   Urinalysis   Result Value Ref Range    Color, UA KARRI (A) Straw/Yellow    Clarity, UA CLOUDY (A) Clear    Glucose, Ur Negative Negative mg/dL    Bilirubin Urine Negative Negative    Ketones, Urine Negative Negative mg/dL    Specific Gravity, UA 1.025 1.005 - 1.030    Blood, Urine LARGE (A) Negative    pH, UA 7.0 5.0 - 9.0    Protein,  (A) Negative mg/dL    Urobilinogen, Urine 0.2 <2.0 E.U./dL Nitrite, Urine Negative Negative    Leukocyte Esterase, Urine SMALL (A) Negative   Microscopic Urinalysis   Result Value Ref Range    WBC, UA 5-10 (A) 0 - 5 /HPF    RBC, UA >20 0 - 2 /HPF    Bacteria, UA RARE (A) None Seen /HPF     No orders to display       ------------------------- NURSING NOTES AND VITALS REVIEWED ---------------------------   The nursing notes within the ED encounter and vital signs as below have been reviewed. BP (!) 149/59   Pulse 58   Temp 97.2 °F (36.2 °C)   Resp 16   Wt 123 lb (55.8 kg)   SpO2 98%   BMI 18.70 kg/m²   Oxygen Saturation Interpretation: Normal      ------------------------------------------ PROGRESS NOTES ------------------------------------------   I have spoken with the patient and discussed todays results, in addition to providing specific details for the plan of care and counseling regarding the diagnosis and prognosis. Their questions are answered at this time and they are agreeable with the plan.      --------------------------------- ADDITIONAL PROVIDER NOTES ---------------------------------     This patient is stable for discharge. I have shared the specific conditions for return, as well as the importance of follow-up. * NOTE: This report was transcribed using voice recognition software. Every effort was made to ensure accuracy; however, inadvertent computerized transcription errors may be present.    --------------------------------- IMPRESSION AND DISPOSITION ---------------------------------    IMPRESSION  1.  Acute cystitis with hematuria        DISPOSITION  Disposition: Discharge to home  Patient condition is good       Theresa Sanchez PA-C  11/17/22 1600

## 2022-11-17 NOTE — ANESTHESIA PRE PROCEDURE
Department of Anesthesiology  Preprocedure Note       Name:  Earl Martinez   Age:  65 y.o.  :  1945                                          MRN:  [id number]         Date:  2022      Surgeon: Dr. Jagdeep Aranda    Procedure: Cardioversion    Medications prior to admission:   Prior to Admission medications    Medication Sig Start Date End Date Taking?  Authorizing Provider   apixaban (ELIQUIS) 5 MG TABS tablet Take 1 tablet by mouth 2 times daily 22   Klaudia Sotelo MD   cefdinir (OMNICEF) 300 MG capsule Take 1 capsule by mouth 2 times daily for 7 days 22  Ramirez Levin PA-C   levothyroxine (SYNTHROID) 100 MCG tablet take 1 tablet by mouth once daily 10/26/22   Historical Provider, MD   pravastatin (PRAVACHOL) 20 MG tablet take 1 tablet by mouth once daily 10/4/22   Historical Provider, MD   triamcinolone (KENALOG) 0.1 % cream APPLY TO TOPICALLY TO AFFECTED AREA TWO TIMES DAILY 22   Historical Provider, MD   amiodarone (CORDARONE) 200 MG tablet Take 1 tablet by mouth daily  Patient not taking: Reported on 10/21/2022 11/5/22   Charmayne Guadalajara, MD   metoprolol succinate (TOPROL XL) 25 MG extended release tablet Take 1 tablet by mouth daily 22   Klaudia Sotelo MD   lisinopril (PRINIVIL;ZESTRIL) 5 MG tablet Take 1 tablet by mouth daily 22   Klaudia Sotelo MD   Multiple Vitamins-Minerals (THERAPEUTIC MULTIVITAMIN-MINERALS) tablet Take 1 tablet by mouth daily    Historical Provider, MD       Current medications:    Current Outpatient Medications   Medication Sig Dispense Refill    apixaban (ELIQUIS) 5 MG TABS tablet Take 1 tablet by mouth 2 times daily 60 tablet 5    cefdinir (OMNICEF) 300 MG capsule Take 1 capsule by mouth 2 times daily for 7 days 14 capsule 0    levothyroxine (SYNTHROID) 100 MCG tablet take 1 tablet by mouth once daily      pravastatin (PRAVACHOL) 20 MG tablet take 1 tablet by mouth once daily      triamcinolone (KENALOG) 0.1 % cream APPLY TO TOPICALLY TO AFFECTED AREA TWO TIMES DAILY      amiodarone (CORDARONE) 200 MG tablet Take 1 tablet by mouth daily (Patient not taking: Reported on 10/21/2022) 90 tablet 1    metoprolol succinate (TOPROL XL) 25 MG extended release tablet Take 1 tablet by mouth daily 90 tablet 3    lisinopril (PRINIVIL;ZESTRIL) 5 MG tablet Take 1 tablet by mouth daily 30 tablet 5    Multiple Vitamins-Minerals (THERAPEUTIC MULTIVITAMIN-MINERALS) tablet Take 1 tablet by mouth daily       No current facility-administered medications for this visit. 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    [facility] onset [facility] fibrillation [facility]) I48.91    Hypothyroidism E03.9    Cardiomyopathy ([facility] [address] I42.9       Past Medical History:        Diagnosis Date    Arthritis     Atrial fibrillation ([facility] [address] 2022    Cardiomyopathy ([facility] [address]     new onset    Hyperlipidemia     Hypertension     hypothyroidism     [facility] 2022    pt denies defecits       Past Surgical History:        Procedure Laterality Date    BREAST ENHANCEMENT SURGERY         Social History:    Social History     Tobacco Use    Smoking status: Former     Packs/day: 1.00     Years: 15.00     Pack years: 15.00     Types: Cigarettes     Quit date:      Years since quittin.     Passive exposure: Past    Smokeless tobacco: Never   Substance Use Topics    Alcohol use: Yes     Comment: occasionally                                Counseling given: Not Answered      Vital Signs (Current): There were no vitals filed for this visit.                                            BP Readings from Last 3 Encounters:   22 (!) 149/59   10/21/22 116/60   10/21/22 118/64       NPO Status:                                                                                 BMI:   Wt Readings from Last 3 Encounters:   22 123 lb (55.8 kg)   10/21/22 122 lb 9.6 oz (55.6 kg)   10/21/22 124 lb 3.2 oz (56.3 kg)     There is no height or weight on file to calculate BMI.    CBC:   Lab Results   Component Value Date/Time    WBC 6.0 04/26/2022 04:35 AM    RBC 4.34 04/26/2022 04:35 AM    HGB 13.7 04/26/2022 04:35 AM    HCT 40.7 04/26/2022 04:35 AM    MCV 93.8 04/26/2022 04:35 AM    RDW 12.7 04/26/2022 04:35 AM     04/26/2022 04:35 AM       CMP:   Lab Results   Component Value Date/Time     10/21/2022 01:50 PM    K 4.9 10/21/2022 01:50 PM    K 4.4 04/25/2022 10:10 AM     10/21/2022 01:50 PM    CO2 26 10/21/2022 01:50 PM    BUN 27 10/21/2022 01:50 PM    CREATININE 1.0 10/21/2022 01:50 PM    GFRAA >60 04/26/2022 04:35 AM    LABGLOM 57 10/21/2022 01:50 PM    GLUCOSE 98 10/21/2022 01:50 PM    PROT 6.8 10/28/2022 11:56 AM    CALCIUM 10.0 10/21/2022 01:50 PM    BILITOT 0.5 10/28/2022 11:56 AM    ALKPHOS 97 10/28/2022 11:56 AM    AST 33 10/28/2022 11:56 AM    ALT 32 10/28/2022 11:56 AM       POC Tests: No results for input(s): POCGLU, POCNA, POCK, POCCL, POCBUN, POCHEMO, POCHCT in the last 72 hours.     Coags: No results found for: PROTIME, INR, APTT    HCG (If Applicable): No results found for: PREGTESTUR, PREGSERUM, HCG, HCGQUANT     ABGs: No results found for: PHART, PO2ART, CYM8WWT, VTZ0ZZP, BEART, M4ZNFSIK     Type & Screen (If Applicable):  No results found for: LABABO, LABRH    Drug/Infectious Status (If Applicable):  No results found for: HIV, HEPCAB    COVID-19 Screening (If Applicable):   Lab Results   Component Value Date/Time    COVID19 Not Detected 01/31/2022 09:23 AM           Anesthesia Evaluation  Patient summary reviewed and Nursing notes reviewed no history of anesthetic complications:   Airway: Mallampati: III  TM distance: >3 FB   Neck ROM: full  Mouth opening: > = 3 FB   Dental:          Pulmonary: breath sounds clear to auscultation  (+) decreased breath sounds     (-) COPD, asthma, sleep apnea and not a current smoker  Rales: R>L (fine)                          ROS comment: Former - 15 pack years  Quit Smokin92       Cardiovascular:  Exercise tolerance: good (>4 METS),   (+) hypertension: moderate and no interval change, CAD (Cardiomyopathy):, dysrhythmias: atrial fibrillation, murmur: Grade 2, hyperlipidemia    (-) past MI and  angina    ECG reviewed  Rhythm: irregular  Rate: normal  Echocardiogram reviewed         Beta Blocker:  Dose within 24 Hrs      ROS comment: EKG:  Atrial fibrillation   Left axis -anterior fascicular block. Poor R-wave progression -nonspecific -consider old anterior infarct. ABNORMAL    ECHO:  Normal left ventricular size. LV systolic function is moderately reduced. Ejection fraction is visually estimated at 35-40%. Abnormal diastolic function. No regional wall motion abnormalities seen. Mild left ventricular concentric hypertrophy noted. The left atrium is severely dilated. Agitated saline injected for shunt evaluation; no obvious shunt but visualization poor and can not be excluded. Mild tricuspid regurgitation. Neuro/Psych:   (+) CVA (Embolic stroke involving right middle cerebral artery ):,    (-) seizures           GI/Hepatic/Renal:        (-) hepatitis and no renal disease       Endo/Other:    (+) hypothyroidism, blood dyscrasia (Eliquis LD ): arthritis: OA., . Pt had no PAT visit       Abdominal:         (-) obese       Vascular: negative vascular ROS. - DVT and PE. Other Findings:             Anesthesia Plan      MAC     ASA 3       Induction: intravenous. MIPS: Prophylactic antiemetics administered. Anesthetic plan and risks discussed with patient. Plan discussed with CRNA.                     Charly Farley MD   [phone]  LILIAN Delcid - VIJAY

## 2022-11-18 ENCOUNTER — ANESTHESIA (OUTPATIENT)
Dept: POSTOP/PACU | Age: 77
End: 2022-11-18

## 2022-11-18 ENCOUNTER — HOSPITAL ENCOUNTER (OUTPATIENT)
Dept: POSTOP/PACU | Age: 77
Discharge: HOME | End: 2022-11-18
Payer: MEDICARE

## 2022-11-18 VITALS
HEART RATE: 48 BPM | TEMPERATURE: 97.8 F | RESPIRATION RATE: 16 BRPM | WEIGHT: 120 LBS | SYSTOLIC BLOOD PRESSURE: 111 MMHG | BODY MASS INDEX: 18.19 KG/M2 | OXYGEN SATURATION: 97 % | HEIGHT: 68 IN | DIASTOLIC BLOOD PRESSURE: 51 MMHG

## 2022-11-18 PROCEDURE — 3700000000 HC ANESTHESIA ATTENDED CARE

## 2022-11-18 PROCEDURE — 7100000001 HC PACU RECOVERY - ADDTL 15 MIN

## 2022-11-18 PROCEDURE — 92960 CARDIOVERSION ELECTRIC EXT: CPT

## 2022-11-18 PROCEDURE — 2580000003 HC RX 258: Performed by: ANESTHESIOLOGY

## 2022-11-18 PROCEDURE — 7100000011 HC PHASE II RECOVERY - ADDTL 15 MIN

## 2022-11-18 PROCEDURE — 3700000001 HC ADD 15 MINUTES (ANESTHESIA)

## 2022-11-18 PROCEDURE — 93005 ELECTROCARDIOGRAM TRACING: CPT | Performed by: ANESTHESIOLOGY

## 2022-11-18 PROCEDURE — 92960 CARDIOVERSION ELECTRIC EXT: CPT | Performed by: INTERNAL MEDICINE

## 2022-11-18 PROCEDURE — 93005 ELECTROCARDIOGRAM TRACING: CPT | Performed by: INTERNAL MEDICINE

## 2022-11-18 PROCEDURE — 2500000003 HC RX 250 WO HCPCS

## 2022-11-18 PROCEDURE — 7100000000 HC PACU RECOVERY - FIRST 15 MIN

## 2022-11-18 PROCEDURE — 6360000002 HC RX W HCPCS

## 2022-11-18 PROCEDURE — 7100000010 HC PHASE II RECOVERY - FIRST 15 MIN

## 2022-11-18 RX ORDER — PROPOFOL 10 MG/ML
INJECTION, EMULSION INTRAVENOUS PRN
Status: DISCONTINUED | OUTPATIENT
Start: 2022-11-18 | End: 2022-11-18 | Stop reason: SDUPTHER

## 2022-11-18 RX ORDER — METOPROLOL SUCCINATE 25 MG/1
12.5 TABLET, EXTENDED RELEASE ORAL DAILY
Qty: 90 TABLET | Refills: 3 | Status: SHIPPED | OUTPATIENT
Start: 2022-11-18

## 2022-11-18 RX ORDER — LIDOCAINE HYDROCHLORIDE 20 MG/ML
INJECTION, SOLUTION INFILTRATION; PERINEURAL PRN
Status: DISCONTINUED | OUTPATIENT
Start: 2022-11-18 | End: 2022-11-18 | Stop reason: SDUPTHER

## 2022-11-18 RX ORDER — SODIUM CHLORIDE 9 MG/ML
INJECTION, SOLUTION INTRAVENOUS PRN
Status: DISCONTINUED | OUTPATIENT
Start: 2022-11-18 | End: 2022-11-19 | Stop reason: HOSPADM

## 2022-11-18 RX ORDER — SODIUM CHLORIDE 0.9 % (FLUSH) 0.9 %
5-40 SYRINGE (ML) INJECTION PRN
Status: DISCONTINUED | OUTPATIENT
Start: 2022-11-18 | End: 2022-11-19 | Stop reason: HOSPADM

## 2022-11-18 RX ORDER — SODIUM CHLORIDE 0.9 % (FLUSH) 0.9 %
5-40 SYRINGE (ML) INJECTION EVERY 12 HOURS SCHEDULED
Status: DISCONTINUED | OUTPATIENT
Start: 2022-11-18 | End: 2022-11-19 | Stop reason: HOSPADM

## 2022-11-18 RX ORDER — SODIUM CHLORIDE, SODIUM LACTATE, POTASSIUM CHLORIDE, CALCIUM CHLORIDE 600; 310; 30; 20 MG/100ML; MG/100ML; MG/100ML; MG/100ML
INJECTION, SOLUTION INTRAVENOUS CONTINUOUS
Status: DISCONTINUED | OUTPATIENT
Start: 2022-11-18 | End: 2022-11-19 | Stop reason: HOSPADM

## 2022-11-18 RX ADMIN — SODIUM CHLORIDE, POTASSIUM CHLORIDE, SODIUM LACTATE AND CALCIUM CHLORIDE: 600; 310; 30; 20 INJECTION, SOLUTION INTRAVENOUS at 11:46

## 2022-11-18 RX ADMIN — LIDOCAINE HYDROCHLORIDE 40 MG: 20 INJECTION, SOLUTION INFILTRATION; PERINEURAL at 11:56

## 2022-11-18 RX ADMIN — PROPOFOL 80 MG: 10 INJECTION, EMULSION INTRAVENOUS at 11:56

## 2022-11-18 NOTE — ANESTHESIA POSTPROCEDURE EVALUATION
Department of Anesthesiology  Postprocedure Note    Patient: Hetal Romero  MRN: [id number]  YOB: 1945  Date of evaluation: 11/18/2022      Procedure Summary     Date: 11/18/22 Room / Location: [facility] PACU    Anesthesia Start: 1147 Anesthesia Stop: 1205    Procedure: CARDIOVERSION Diagnosis: Encounter for cardioversion procedure    Scheduled Providers:  Responsible Provider: Nuvia Barrera MD    Anesthesia Type: MAC ASA Status: 3          Anesthesia Type: MAC    Home Phase I: Home Score: 9    Home Phase II:        Anesthesia Post Evaluation    Patient location during evaluation: PACU  Patient participation: complete - patient participated  Level of consciousness: awake  Pain score: 0  Airway patency: patent  Nausea & Vomiting: no nausea and no vomiting  Complications: no  Cardiovascular status: hemodynamically stable  Respiratory status: acceptable  Hydration status: euvolemic

## 2022-11-18 NOTE — PROGRESS NOTES
1141-- Patient arrived to PACU, and placed on monitors. Initial VS- 160/73, 69 A Fib, 18, 97.1, 98% RA  1155- Dr. Leonor Saucedo arrived  - Time out performed  1157- Medicated by anesthesia  1158- Shocked in sync mode at 200 J and converted to sinus rhythm with first degree AV block. Order received for EKG. Patient tolerated procedure well.

## 2022-11-18 NOTE — PROGRESS NOTES
Verified with Dr. Jose Alfaro the patient may go to [address] Testing with HR in the low 40's/high 30's, BP ok, patient asymptomatic. Dr. Kandice Hui informed the patient he is cutting her beta blocker dose in half.

## 2022-11-18 NOTE — H&P
CHIEF COMPLAINT: Afib    HISTORY OF PRESENT ILLNESS: Patient is a 65 y.o. female seen at the request of Lise Henry MD.      Presents for cardioversion. No CP or SOB.      Past Medical History:   Diagnosis Date    Arthritis     Atrial fibrillation ([facility] 04/25/2022    Cardiomyopathy ([facility]     new onset    Hyperlipidemia     Hypertension     hypothyroidism     Ischemic stroke ([facility] 04/25/2022    pt denies defecits       Patient Active Problem List   Diagnosis    Embolic stroke involving right middle cerebral artery ([facility]    New onset atrial fibrillation (HCC)    Hypothyroidism    Cardiomyopathy ([facility]       No Known Allergies    Current Outpatient Medications   Medication Sig Dispense Refill    apixaban (ELIQUIS) 5 MG TABS tablet Take 1 tablet by mouth 2 times daily 60 tablet 5    cefdinir (OMNICEF) 300 MG capsule Take 1 capsule by mouth 2 times daily for 7 days 14 capsule 0    levothyroxine (SYNTHROID) 100 MCG tablet take 1 tablet by mouth once daily      pravastatin (PRAVACHOL) 20 MG tablet take 1 tablet by mouth once daily      triamcinolone (KENALOG) 0.1 % cream APPLY TO TOPICALLY TO AFFECTED AREA TWO TIMES DAILY      amiodarone (CORDARONE) 200 MG tablet Take 1 tablet by mouth daily (Patient not taking: No sig reported) 90 tablet 1    metoprolol succinate (TOPROL XL) 25 MG extended release tablet Take 1 tablet by mouth daily 90 tablet 3    lisinopril (PRINIVIL;ZESTRIL) 5 MG tablet Take 1 tablet by mouth daily 30 tablet 5    Multiple Vitamins-Minerals (THERAPEUTIC MULTIVITAMIN-MINERALS) tablet Take 1 tablet by mouth daily       Current Facility-Administered Medications   Medication Dose Route Frequency Provider Last Rate Last Admin    lactated ringers infusion   IntraVENous Continuous Ambreen Oropeza MD        sodium chloride flush 0.9 % injection 5-40 mL  5-40 mL IntraVENous 2 times per day Lewis Jolley DO        sodium chloride flush 0.9 % injection 5-40 mL  5-40 mL IntraVENous PRN Vaibhav KENDRICK Trevin DO        0.9 % sodium chloride infusion   IntraVENous PRN Cindy Beaver DO           Social History     Socioeconomic History    Marital status: Single     Spouse name: Not on file    Number of children: Not on file    Years of education: Not on file    Highest education level: Not on file   Occupational History    Not on file   Tobacco Use    Smoking status: Former     Packs/day: 1.00     Years: 15.00     Pack years: 15.00     Types: Cigarettes     Quit date: 12     Years since quittin.9     Passive exposure: Past    Smokeless tobacco: Never   Vaping Use    Vaping Use: Never used   Substance and Sexual Activity    Alcohol use: Yes     Comment: occasionally    Drug use: Never    Sexual activity: Not on file   Other Topics Concern    Not on file   Social History Narrative    Not on file     Social Determinants of Health     Financial Resource Strain: Not on file   Food Insecurity: Not on file   Transportation Needs: Not on file   Physical Activity: Not on file   Stress: Not on file   Social Connections: Not on file   Intimate Partner Violence: Not on file   Housing Stability: Not on file       Family History   Problem Relation Age of Onset    Stroke Mother     Atrial Fibrillation Mother     Pneumonia Father     Heart Failure Brother        Review of Systems:  Heart: as above   Lungs: as above   Eyes: denies changes in vision or discharge. Ears: denies changes in hearing or pain. Nose: denies epistaxis or masses   Throat: denies sore throat or trouble swallowing. Neuro: denies numbness, tingling, tremors. Skin: denies rashes or itching. : denies hematuria, dysuria   GI: denies vomiting, diarrhea   Psych: denies mood changed, anxiety, depression. All other systems negative. Physical Exam   /72   Pulse 99   Temp 97.3 °F (36.3 °C) (Temporal)   Resp 14   Ht 5' 8\" (1.727 m)   Wt 120 lb (54.4 kg)   SpO2 99%   BMI 18.25 kg/m²   Constitutional: Oriented to person, place, and time. Well-developed and well-nourished. No distress. Head: Normocephalic and atraumatic. Eyes: EOM are normal. Pupils are equal, round, and reactive to light. Neck: Normal range of motion. Neck supple. No hepatojugular reflux and no JVD present. Carotid bruit is not present. No tracheal deviation present. No thyromegaly present. Cardiovascular: Normal rate, regular rhythm, normal heart sounds and intact distal pulses. Exam reveals no gallop and no friction rub. No murmur heard. Pulmonary/Chest: Effort normal and breath sounds normal. No respiratory distress. No wheezes. No rales. No tenderness. Abdominal: Soft. Bowel sounds are normal. No distension and no mass. No tenderness. No rebound and no guarding. Musculoskeletal: Normal range of motion. No edema and no tenderness. Lymphadenopathy:   No cervical adenopathy. No groin adenopathy. Neurological: Alert and oriented to person, place, and time. Skin: Skin is warm and dry. No rash noted. Not diaphoretic. No erythema. Psychiatric: Normal mood and affect. Behavior is normal.     Echocardiogram:   XANDER 6/28/22   Summary   Ejection fraction is visually estimated at 45-50%. No evidence of thrombus within left atrium or appendage. Small PFO noted by color Doppler. 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 Severe bileaflet prolapse, most prominent at A3/P3. Moderate mitral regurgitation. TTE 4/26/22   Normal left ventricular size. LV systolic function is moderately reduced. Ejection fraction is visually estimated at 35-40%. Abnormal diastolic function. No regional wall motion abnormalities seen. Mild left ventricular concentric hypertrophy noted. The left atrium is severely dilated. Agitated saline injected for shunt evaluation; no obvious shunt but   visualization poor and can not be excluded.    Mild tricuspid regurgitation    Cardiac MRI [facility] 8/1/2022        MRI brain 4/25/2022  Impression   Acute ischemia in the right middle cerebral artery territory. ASSESSMENT AND PLAN:  Patient Active Problem List   Diagnosis    Embolic stroke involving right middle cerebral artery ([address]    New onset atrial fibrillation ([address]    Hypothyroidism    Cardiomyopathy ([address]     1. Persistent atrial fibrillation:    Diagnosed 4/2022. [id number] score 5    Unsuccessful XANDER/DCC attempt 6/28/2022. Amio loaded. Eliquis. Repeat DCCV today on AAD. 2. Cardiomyopathy EF 35-40%. 45-50% by more recent XANDER, 42% by CMR. Possibly arrhythmia related. Stress negative for ischemia. Medically manage. 3. VHD: Severe bileaflet mitral valve prolapse/Pascal valve, with moderate MR.    4. Right MCA stroke,  9/6412 suspected embolic due to AF    5. Hypothyroidism: On HRT. 6. Statin intolerance (atorvastatin and rosuvastatin), tolerating pravastatin    Priscilla Mckinnon D.O.   Cardiologist  Cardiology, [facility]

## 2022-11-18 NOTE — DISCHARGE INSTRUCTIONS
Electrical Cardioversion: What to Expect at [facility]     Electrical cardioversion is a treatment for an abnormal heartbeat, such as atrial fibrillation, supraventricular tachycardia, or ventricular tachycardia (VT). Your doctor used a brief electrical shock to reset your heart's rhythm. After the procedure, you may have redness, like a sunburn, where the patches were. The medicines you got to make you sleepy may make you feel drowsy for the rest of the day. You may feel soreness or discomfort in your chest wall for a few days. Your doctor may have you take medicines to help the heart beat normally and to prevent blood clots. This care sheet gives you a general idea about how long it will take for you to recover. But each person recovers at a different pace. Follow the steps below to feel better as quickly as possible. How can you care for yourself at home? Medicines    If the doctor gave you a sedative: For 24 hours, don't do anything that requires attention to detail. It takes time for the medicine's effects to completely wear off. For your safety, do not drive or operate any machinery that could be dangerous. Wait until the medicine wears off and you can think clearly and react easily. Be safe with medicines. Take your medicines exactly as prescribed. Call your doctor if you think you are having a problem with your medicine. You may take one or more of the following medicines:  Rate-control medicines to slow the heart rate. Rhythm-control medicines that help the heart keep a normal rhythm. Blood thinners, also called anticoagulants, which help prevent blood clots. You will get more details on the specific medicines your doctor prescribes. Be sure you know how to take your medicines safely.      Do not take any vitamins, over-the-counter medicines, or herbal products without talking to your doctor first.   Exercise    Talk to your doctor about what type and level of exercise are safe for you. When you exercise, watch for signs that your heart is working too hard. You are pushing too hard if you cannot talk while you are exercising. If you become short of breath or dizzy or have chest pain, sit down and rest right away. Check your pulse regularly. Place two fingers on the artery at the palm side of your wrist in line with your thumb. If your heartbeat seems uneven or fast, talk to your doctor. Heart-healthy lifestyle    Do not smoke. If you need help quitting, talk to your doctor about stop-smoking programs and medicines. These can increase your chances of quitting for good. Eat heart-healthy foods. Limit sodium, alcohol, and sugar. Stay at a healthy weight. Lose weight if you need to. Manage other health problems. If you think you may have a problem with alcohol or drug use, talk to your doctor. Follow-up care is a key part of your treatment and safety. Be sure to make and go to all appointments, and call your doctor if you are having problems. It's also a good idea to know your test results and keep a list of the medicines you take. When should you call for help? Call 911 anytime you think you may need emergency care. For example, call if:    You passed out (lost consciousness). You have symptoms of a heart attack. These may include:  Chest pain or pressure, or a strange feeling in the chest.  Sweating. Shortness of breath. Nausea or vomiting. Pain, pressure, or a strange feeling in the back, neck, jaw, or upper belly or in one or both shoulders or arms. Lightheadedness or sudden weakness. A fast or irregular heartbeat. After calling 911, the  may tell you to chew 1 adult-strength or 2 to 4 low-dose aspirin. Wait for an ambulance. Do not try to drive yourself. You have symptoms of a stroke. These may include:  Sudden numbness, tingling, weakness, or loss of movement in your face, arm, or leg, especially on only one side of your body.   Sudden vision changes. Sudden trouble speaking. Sudden confusion or trouble understanding simple statements. Sudden problems with walking or balance. A sudden, severe headache that is different from past headaches. Call your doctor now or seek immediate medical care if:    You feel dizzy or lightheaded, or you feel like you may faint. You have a fast or irregular heartbeat. Watch closely for any changes in your health, and be sure to contact your doctor if you have any problems. Where can you learn more? Go to https://eCaringpeZong.[employer]. org and sign in to your [employer] account. Enter A617 in the [employer] box to learn more about \"Electrical Cardioversion: What to Expect at Home. \"     If you do not have an account, please click on the \"Sign Up Now\" link. Current as of: January 10, 2022               Content Version: 13.4  © 9984-4400 [employer], Inc[employer]. Care instructions adapted under license by [facility] ([facility]). If you have questions about a medical condition or this instruction, always ask your healthcare professional. [address] any warranty or liability for your use of this information.

## 2022-11-18 NOTE — PROCEDURES
DCCV    Dx afib    Consented. Sedation by anesthesia. DCCV synch 200 J x 1 to NSR. No complications. Linda Yoon D.O.   Cardiologist  Cardiology, [address]

## 2022-11-19 LAB
EKG ATRIAL RATE: 43 BPM
EKG ATRIAL RATE: 55 BPM
EKG P AXIS: 79 DEGREES
EKG P-R INTERVAL: 348 MS
EKG Q-T INTERVAL: 476 MS
EKG Q-T INTERVAL: 544 MS
EKG QRS DURATION: 124 MS
EKG QRS DURATION: 130 MS
EKG QTC CALCULATION (BAZETT): 459 MS
EKG QTC CALCULATION (BAZETT): 483 MS
EKG R AXIS: -48 DEGREES
EKG R AXIS: -49 DEGREES
EKG T AXIS: 74 DEGREES
EKG T AXIS: 95 DEGREES
EKG VENTRICULAR RATE: 43 BPM
EKG VENTRICULAR RATE: 62 BPM

## 2022-11-20 LAB
ORGANISM: ABNORMAL
URINE CULTURE, ROUTINE: ABNORMAL

## 2022-11-23 ENCOUNTER — TELEPHONE (OUTPATIENT)
Dept: NON INVASIVE DIAGNOSTICS | Age: 77
End: 2022-11-23

## 2022-11-23 DIAGNOSIS — I48.91 NEW ONSET ATRIAL FIBRILLATION (HCC): Primary | ICD-10-CM

## 2022-11-23 NOTE — TELEPHONE ENCOUNTER
Patient will go to [address] [place] [address] next week for an EKG, s/p CV and on Amio. I will have to call her for a f/u in January with the NP. She verbalized understanding.

## 2022-11-28 RX ORDER — LISINOPRIL 5 MG/1
TABLET ORAL
Qty: 90 TABLET | Refills: 3 | Status: SHIPPED | OUTPATIENT
Start: 2022-11-28

## 2022-11-29 ENCOUNTER — HOSPITAL ENCOUNTER (OUTPATIENT)
Age: 77
Discharge: HOME | End: 2022-11-29
Payer: MEDICARE

## 2022-11-29 LAB
EKG ATRIAL RATE: 47 BPM
EKG P AXIS: 55 DEGREES
EKG P-R INTERVAL: 356 MS
EKG Q-T INTERVAL: 536 MS
EKG QRS DURATION: 132 MS
EKG QTC CALCULATION (BAZETT): 474 MS
EKG R AXIS: -46 DEGREES
EKG T AXIS: 92 DEGREES
EKG VENTRICULAR RATE: 47 BPM

## 2022-11-29 PROCEDURE — 93005 ELECTROCARDIOGRAM TRACING: CPT | Performed by: INTERNAL MEDICINE

## 2023-01-22 NOTE — PROGRESS NOTES
[address] and Vascular [facility] Electrophysiology  Outpatient Progress Report  PATIENT: Algernon Call  MEDICAL RECORD NUMBER: [id number]  DATE OF SERVICE:  1/23/2023  ATTENDING ELECTROPHYSIOLOGIST: Jalen Babin MD   REFERRING PHYSICIAN:  Efraín Day MD  CHIEF COMPLAINT: Persistent atrial fibrillation and nonischemic cardiomyopathy    HPI: This is a 65 y.o. female with a history of   Patient Active Problem List   Diagnosis    Embolic stroke involving right middle cerebral artery ([address]    New onset atrial fibrillation ([address]    Hypothyroidism    Cardiomyopathy ([address]    Arthritis    Chronic combined systolic and diastolic heart failure ([address]    First degree atrioventricular block    Hyperlipidemia    Insomnia    Neuralgia    Nonsenile cataract    Nuclear senile cataract    Presbyopia    Seborrheic dermatitis    Subjective visual disturbance    Tear film insufficiency   who presents to cardiac electrophysiology clinic for Follow up  of persistent atrial fibrillation and nonischemic cardiomyopathy. The patient was seen by Dr. Chon Mayer in April of 2022 when she presented to the hospital with headache and was found to have right MCA stroke and new onset AF of unknown duration. Echocardiogram on 4/26/22 showed LV EF of 35-40%, mild bileaflet mitral valve prolapse with mild MR and severe LAE. She subsequently underwent nuclear stress test 6/16/22 which showed low risk result and LV EF of 51%. She underwent XANDER and DC-CV on 6/28/22. 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 She had successful DC- Cardioversion but developed ERAF. The patient underwent cardiac MRI which showed moderate MR and LV EF of 42%. She was seen by Dr. Tanisha Gutierrez on 10/21/2022 and was subsequently placed on Amiodaron and underwent repeat DCCV on 11/18/22 with Dr. Belle Enamorado who due to bradycardia in the 40's reduced her Toprol to 12.5 mg daily.  A Follow up EKG on 11/29/22 showed sinus bradycardia. Today she presents in sinus with a rate of 42 bpm and has noticed intermittent lightheadedness currently while in the seated position typically in the morning hours between 7 AM and noon (lightheaded/near syncopal episodes last a number of seconds occur without prodromal symptoms occur in the seated position have not resulted in full syncope-ongoing since mid December occur every other or every day. He also notes ongoing chest discomfort when walking. She was ambulated in the with a pulse ox with her rates increasing to 67 bpm with mild ambulation. Today the use of a pacemaker was discussed.         Patient Active Problem List    Diagnosis Date Noted    Nonsenile cataract 01/23/2023     Priority: Medium    Nuclear senile cataract 01/23/2023     Priority: Medium    Presbyopia 01/23/2023     Priority: Medium    Subjective visual disturbance 01/23/2023     Priority: Medium    Tear film insufficiency 01/23/2023     Priority: Medium    Chronic combined systolic and diastolic heart failure ([address] 06/28/2022     Priority: Medium    Cardiomyopathy ([address] 04/26/2022     Priority: Medium    Embolic stroke involving right middle cerebral artery ([address] 04/25/2022     Priority: Medium    New onset atrial fibrillation ([address] 04/25/2022     Priority: Medium    Hypothyroidism 04/25/2022     Priority: Medium    Neuralgia 01/31/2019     Priority: Medium    Insomnia 01/01/2016     Priority: Medium    Arthritis 01/01/2009     Priority: Medium    Seborrheic dermatitis 01/01/2007     Priority: Medium    Hyperlipidemia 01/01/2003     Priority: Medium    First degree atrioventricular block 01/01/1987     Priority: Medium       Past Medical History:   Diagnosis Date    Arthritis     Atrial fibrillation ([address] 04/25/2022    Cardiomyopathy ([address]     new onset    Hyperlipidemia     Hypertension     hypothyroidism     Ischemic stroke ([address] 04/25/2022    pt denies defecits       Family History   Problem Relation Age of Onset    Stroke Mother     Atrial Fibrillation Mother     Pneumonia Father     Heart Failure Brother        Social History     Tobacco Use    Smoking status: Former     Packs/day: 1.00     Years: 15.00     Pack years: 15.00     Types: Cigarettes     Quit date:      Years since quittin.0     Passive exposure: Past    Smokeless tobacco: Never   Substance Use Topics    Alcohol use: Yes     Comment: occasionally       Current Outpatient Medications   Medication Sig Dispense Refill    lisinopril (PRINIVIL;ZESTRIL) 5 MG tablet take 1 tablet by mouth once daily 90 tablet 3    metoprolol succinate (TOPROL XL) 25 MG extended release tablet Take 0.5 tablets by mouth daily 90 tablet 3    apixaban (ELIQUIS) 5 MG TABS tablet Take 1 tablet by mouth 2 times daily 60 tablet 5    levothyroxine (SYNTHROID) 100 MCG tablet take 1 tablet by mouth once daily      pravastatin (PRAVACHOL) 20 MG tablet take 1 tablet by mouth once daily      triamcinolone (KENALOG) 0.1 % cream APPLY TO TOPICALLY TO AFFECTED AREA TWO TIMES DAILY      amiodarone (CORDARONE) 200 MG tablet Take 1 tablet by mouth daily 90 tablet 1    Multiple Vitamins-Minerals (THERAPEUTIC MULTIVITAMIN-MINERALS) tablet Take 1 tablet by mouth daily       No current facility-administered medications for this visit. No Known Allergies    ROS:   Constitutional: Negative for fever, activity change and appetite change. HENT: Negative for epistaxis. Eyes: Negative for diploplia, blurred vision. Respiratory: Negative for cough, chest tightness, shortness of breath and wheezing. Cardiovascular: pertinent positives in HPI  Gastrointestinal: Negative for abdominal pain and blood in stool.    All other review of systems are negative     PHYSICAL EXAM:   Vitals:    23 1254   BP: 112/64   Site: Left Upper Arm   Position: Sitting   Cuff Size: Medium Adult   Pulse: (!) 42   Resp: 16   Weight: 128 lb 6.4 oz (58.2 kg)   Height: 5' 8\" (1.727 m) Constitutional: Well-developed, no acute distress  Eyes: conjunctivae normal, no xanthelasma   Ears, Nose, Throat: oral mucosa moist, no cyanosis   CV: no JVD. Bradycardic regular . No murmurs, rubs, or gallops. PMI is nondisplaced  Lungs: clear to auscultation bilaterally, normal respiratory effort without used of accessory muscles  Abdomen: soft, non-tender, bowel sounds present, no masses or hepatomegaly   Musculoskeletal: no digital clubbing, no edema   Skin: warm, no rashes     I have personally reviewed the laboratory, cardiac diagnostic and radiographic testing as outlined below:    Data:    No results for input(s): WBC, HGB, HCT, PLT in the last 72 hours. No results for input(s): NA, K, CL, CO2, BUN, CREATININE, GLU, CALCIUM in the last 72 hours. Invalid input(s): MAGNESIUM   No results found for: MG  No results for input(s): TSH in the last 72 hours. No results for input(s): INR in the last 72 hours. CXR 4/25/22:   FINDINGS:   Borderline cardiomegaly. Normal pulmonary vascularity. Lungs are clear. Neither costophrenic angle is blunted. There is calcification of the mitral   annulus. Impression   Borderline cardiomegaly and calcification at the mitral annulus. EKG 01/23/23 : Sinus rhythm rate 42 bpm, CO 380ms QRS 136ms Qtc 483ms  please see scan in Cardiology. Echocardiogram 4/26/22: Findings      Left Ventricle   Normal left ventricular size. LV systolic function is moderately reduced. Ejection fraction is visually estimated at 35-40%. Abnormal diastolic function. No regional wall motion abnormalities seen. Mild left ventricular concentric hypertrophy noted. Right Ventricle   The right ventricle was not clearly visualized. Left Atrium   The left atrium is severely dilated. MADAI 84 ml/m2. The interatrial septum appears intact. Agitated saline injected for shunt evaluation; no obvious shunt but   visualization poor and can not be excluded. Right Atrium   Right Atrium is not clearly visualized. Mitral Valve   Moderate mitral annular calcification. Mild bileaflet mitral valve prolapse. No evidence of mitral valve stenosis. Mild mitral regurgitation. Tricuspid Valve   The tricuspid valve appears structurally normal.   Mild tricuspid regurgitation. RVSP is at least 23 mmHg. Aortic Valve   Individual aortic valve leaflets are not clearly visualized. The aortic valve is probably trileaflet. No hemodynamically significant aortic stenosis is present. No evidence of aortic valve regurgitation. Pulmonic Valve   The pulmonic valve was not well visualized. No evidence of pulmonic valve stenosis. No evidence of any pulmonic regurgitation. Pericardial Effusion   No evidence of pericardial effusion. Aorta   Aortic root dimension within normal limits. Miscellaneous   Normal Inferior Vena Cava diameter and respiratory variation. Conclusions      Summary   Technically difficult study - limited visualization due to breast   implants. Normal left ventricular size. LV systolic function is moderately reduced. Ejection fraction is visually estimated at 35-40%. Abnormal diastolic function. No regional wall motion abnormalities seen. Mild left ventricular concentric hypertrophy noted. The left atrium is severely dilated. Agitated saline injected for shunt evaluation; no obvious shunt but   visualization poor and can not be excluded. Mild tricuspid regurgitation. Signature      ----------------------------------------------------------------   Electronically signed by Tang Yost MD(Interpreting   physician) on 04/26/2022 05:02 PM   ----------------------------------------------------------------    XANDER 6/28/22: Findings      Left Ventricle   Normal left ventricular size and systolic function. Ejection fraction is visually estimated at 45-50%.       Right Ventricle   Normal right ventricular size and function. Left Atrium   Dilated left atrium. No evidence of thrombus within left atrium or appendage. Mild spontaneous echo contrast was present in LA appendage. Small PFO noted by color Doppler. Agitated saline injected for shunt evaluation. No shunting of bubbles. Right Atrium   Normal right atrial size. No evidence of thrombus or mass in the right atrium. 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 Severe bileaflet prolapse, most prominent at A3/P3. No evidence of mitral valve stenosis. Moderate mitral regurgitation. ERO 0.3 cm2. RV 48 mL. Tricuspid Valve   The tricuspid valve appears structurally normal.   Mild tricuspid regurgitation. Aortic Valve   Structurally normal aortic valve. The aortic valve is trileaflet. No hemodynamically significant aortic stenosis is present. Mild aortic valve regurgitation. Pulmonic Valve   Pulmonic valve is structurally normal.   Physiologic and/or trace pulmonic regurgitation present. Pericardial Effusion   No evidence of pericardial effusion. Aorta   Mild atherosclerotic disease of the descending thoracic aorta and arch. Miscellaneous   LUPV: Systolic blunting   LLPV: Systolic blunting   RUPV: Systolic blunting   RLPV: Systolic blunting      Conclusions      Summary   Ejection fraction is visually estimated at 45-50%. No evidence of thrombus within left atrium or appendage. Small PFO noted by color Doppler. 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 Severe bileaflet prolapse, most prominent at A3/P3. Moderate mitral regurgitation.       Signature      ----------------------------------------------------------------   Electronically signed by Tawanna Ruiz MD(Interpreting   physician) on 06/28/2022 01:47 PM   ----------------------------------------------------------------     Stress Test 6/16/22:   FINDINGS: The overall quality of the study was good. Left ventricular cavity size was noted to be normal.     Rotational analog analysis demonstrated soft tissue diaphragmatic attenuation. A moderate defect was present in the basal inferoseptal wall(s) that was  small size on the post regadeson images                                                                     The resting images show no change. Gated SPECT left ventricular ejection fraction was calculated to be 51%, with normal myocardial thickening and wall motion. Impression:    Electrocardiographically normal regadenoson infusion with a clinically  non-ischemic response when compared to the baseline ECG  Myocardial perfusion imaging was normal with attenuation artifact. Overall left ventricular systolic function was normal without regional wall motion abnormalities. 4. Low risk general pharmacologic stress test.     Thank you for sending your patient to this [employer]. Electronically signed by Pierre Braga MD on 6/16/2022 at 12:27 PM     Cardiac MRI 8/1/22: I have independently reviewed all of the ECGs and rhythm strips per above     Assessment/Plan: This is a 65 y.o. female with a history of   Patient Active Problem List   Diagnosis    Embolic stroke involving right middle cerebral artery ([address]    New onset atrial fibrillation ([address]    Hypothyroidism    Cardiomyopathy ([address]    Arthritis    Chronic combined systolic and diastolic heart failure ([address]    First degree atrioventricular block    Hyperlipidemia    Insomnia    Neuralgia    Nonsenile cataract    Nuclear senile cataract    Presbyopia    Seborrheic dermatitis    Subjective visual disturbance    Tear film insufficiency    who presents with persistent atrial fibrillation.     1. Persistent atrial fibrillation  - Diagnosed with new onset AF of unknown duration in April 2022.   - [id number] of 5  - Underwent XANDER and successful DC-CV on 6/28/22 with ERAF.   - On Toprol XL for rate control.  - On Eliquis for stroke risk reduction.  - Amiodarone started 10/2022 and Successful DCCV 11/8/22  - Presents in sinus bradycardia  - We discussed about AF treatment options which include AAD therapy (Amiodarone versus Tikosyn versus Sotalol) with possible DC-cardioversion versus rate control strategy. She opts for Amiodarone therapy. - Education on importance of well controlled HTN (goal BP < 130/80), adequate weight control (goal BMI of < 27), physical activity consisting of moderate cardiopulmonary exercise up to a goal of 250 min/wk, daily compliance with CPAP in treating sleep apnea, smoking cessation and limited ETOH intake. 2. Nonischemic cardiomyopathy and chronic HFmrEF  - Echocardiogram on 4/26/22 showed LV EF of 35-40%. - Nuclear stress test 6/16/22 which showed low risk result and LV EF of 51%. - Cardiac MRI 8/1/22 showed LV EF of 42%. - Compensated and euvolemic.  - NYHA class II-III, ACC/AHA stage C.    3. Sinus node dysfunction   - Asymptomatic   - Limiting titration of BB, Toprol reduced from 25 to 12.5 mg post DCCV 11/2022    4. Valvular heart disease  - Echocardiogram on 4/26/22 showed mild bileaflet mitral valve prolapse with mild MR.   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  - Cardiac MRI 8/1/22 showed moderate MR.    5. CVA  - Diagnosed with right MCA stroke in April 2022.  - On Eliquis. 6. Hyperlipidemia  - On Pravachol. 7. Hypothyroidism  - On Synthroid. Recommendations:    Repeat echo in sinus. (Will call with results)   Continue Amiodarone 200mg daily. Hepatic function and thyroid function assessment. Continue Toprol 12.5 Mg daily. 14-day ZIO XT monitor due to complaints of near syncope. (Will call with results)  Discussed indications for PPM.  Follow up in 6 months with Dr. Oz Estrada.      I have spent a total of 40 minutes with the patient and the family reviewing the above stated recommendations. And a total of >50% of that time involved face-to-face time providing counseling and or coordination of care with the other providers, preparation for the clinic visit, reviewing records/tests, counseling/education of the patient, ordering medications/tests/procedures, coordinating care, and documenting clinical information in the EHR. Thank you for allowing me to participate in your patient's care. Please call me if there are any questions or concerns.       LILIAN Snyder - CNP  Cardiac Electrophysiology  [facility]) Physicians  [facility] [place]  1:33 PM  1/23/2023

## 2023-01-23 ENCOUNTER — OFFICE VISIT (OUTPATIENT)
Dept: NON INVASIVE DIAGNOSTICS | Age: 78
End: 2023-01-23
Payer: MEDICARE

## 2023-01-23 VITALS
SYSTOLIC BLOOD PRESSURE: 112 MMHG | RESPIRATION RATE: 16 BRPM | WEIGHT: 128.4 LBS | BODY MASS INDEX: 19.46 KG/M2 | HEART RATE: 42 BPM | HEIGHT: 68 IN | DIASTOLIC BLOOD PRESSURE: 64 MMHG

## 2023-01-23 DIAGNOSIS — I50.22 CHRONIC HFREF (HEART FAILURE WITH REDUCED EJECTION FRACTION) (HCC): ICD-10-CM

## 2023-01-23 DIAGNOSIS — I48.0 PAF (PAROXYSMAL ATRIAL FIBRILLATION) (HCC): Primary | ICD-10-CM

## 2023-01-23 PROBLEM — H53.10 SUBJECTIVE VISUAL DISTURBANCE: Status: ACTIVE | Noted: 2023-01-23

## 2023-01-23 PROBLEM — H52.4 PRESBYOPIA: Status: ACTIVE | Noted: 2023-01-23

## 2023-01-23 PROBLEM — H26.9 NONSENILE CATARACT: Status: ACTIVE | Noted: 2023-01-23

## 2023-01-23 PROBLEM — I50.42 CHRONIC COMBINED SYSTOLIC AND DIASTOLIC HEART FAILURE (HCC): Status: ACTIVE | Noted: 2022-06-28

## 2023-01-23 PROBLEM — H25.10 NUCLEAR SENILE CATARACT: Status: ACTIVE | Noted: 2023-01-23

## 2023-01-23 PROBLEM — M79.2 NEURALGIA: Status: ACTIVE | Noted: 2019-01-31

## 2023-01-23 PROBLEM — H04.129 TEAR FILM INSUFFICIENCY: Status: ACTIVE | Noted: 2023-01-23

## 2023-01-23 PROCEDURE — 99215 OFFICE O/P EST HI 40 MIN: CPT | Performed by: NURSE PRACTITIONER

## 2023-01-23 PROCEDURE — 1123F ACP DISCUSS/DSCN MKR DOCD: CPT | Performed by: NURSE PRACTITIONER

## 2023-01-23 PROCEDURE — 93000 ELECTROCARDIOGRAM COMPLETE: CPT | Performed by: INTERNAL MEDICINE

## 2023-01-23 NOTE — PROGRESS NOTES
Patient was seen in Office today to have 14 day zio xt monitor put on per [employer]. Pt tolerated placement and understood use and return of device number [id number].         Electronically signed by Indy Maciel MA on 1/23/2023 at 1:30 PM

## 2023-01-23 NOTE — PATIENT INSTRUCTIONS
Continue current medication. Plan for repeat echo to assess heart function. 14 day monitor (will call with results). Please obtain lab work.

## 2023-01-27 ENCOUNTER — HOSPITAL ENCOUNTER (OUTPATIENT)
Age: 78
Discharge: HOME | End: 2023-01-27
Payer: MEDICARE

## 2023-01-27 ENCOUNTER — TELEPHONE (OUTPATIENT)
Dept: CARDIOLOGY | Age: 78
End: 2023-01-27

## 2023-01-27 DIAGNOSIS — I48.0 PAF (PAROXYSMAL ATRIAL FIBRILLATION) (HCC): ICD-10-CM

## 2023-01-27 LAB
ALBUMIN SERPL-MCNC: 4.2 G/DL (ref 3.5–5.2)
ALP BLD-CCNC: 89 U/L (ref 35–104)
ALT SERPL-CCNC: 40 U/L (ref 0–32)
ANION GAP SERPL CALCULATED.3IONS-SCNC: 8 MMOL/L (ref 7–16)
AST SERPL-CCNC: 38 U/L (ref 0–31)
BILIRUB SERPL-MCNC: 0.5 MG/DL (ref 0–1.2)
BUN BLDV-MCNC: 21 MG/DL (ref 6–23)
CALCIUM SERPL-MCNC: 9.4 MG/DL (ref 8.6–10.2)
CHLORIDE BLD-SCNC: 104 MMOL/L (ref 98–107)
CO2: 29 MMOL/L (ref 22–29)
CREAT SERPL-MCNC: 1.1 MG/DL (ref 0.5–1)
GFR SERPL CREATININE-BSD FRML MDRD: 51 ML/MIN/1.73
GLUCOSE BLD-MCNC: 121 MG/DL (ref 74–99)
POTASSIUM SERPL-SCNC: 4.7 MMOL/L (ref 3.5–5)
SODIUM BLD-SCNC: 141 MMOL/L (ref 132–146)
TOTAL PROTEIN: 6.8 G/DL (ref 6.4–8.3)
TSH SERPL DL<=0.05 MIU/L-ACNC: 5.75 UIU/ML (ref 0.27–4.2)

## 2023-01-27 PROCEDURE — 84443 ASSAY THYROID STIM HORMONE: CPT

## 2023-01-27 PROCEDURE — 36415 COLL VENOUS BLD VENIPUNCTURE: CPT

## 2023-01-27 PROCEDURE — 80053 COMPREHEN METABOLIC PANEL: CPT

## 2023-01-31 ENCOUNTER — TELEPHONE (OUTPATIENT)
Dept: NON INVASIVE DIAGNOSTICS | Age: 78
End: 2023-01-31

## 2023-01-31 DIAGNOSIS — Z79.899 LONG TERM CURRENT USE OF AMIODARONE: ICD-10-CM

## 2023-01-31 DIAGNOSIS — I42.9 CARDIOMYOPATHY, UNSPECIFIED TYPE (HCC): Primary | ICD-10-CM

## 2023-01-31 NOTE — TELEPHONE ENCOUNTER
----- Message from LILIAN Tidwell CNP sent at 1/30/2023  3:21 PM EST -----  Melchor Duncan will need a repeat TSH, Free T3 and T4, CMP in 4 weeks as her numbers were borderline and require ongoing monitoring.  Thanks.    ----- Message -----  From: Amelia Cole Incoming Results From [facility] [employer]  Sent: 1/27/2023  12:23 PM EST  To: LILIAN Tidwell CNP

## 2023-02-01 ENCOUNTER — HOSPITAL ENCOUNTER (OUTPATIENT)
Dept: CARDIOLOGY | Age: 78
Discharge: HOME | End: 2023-02-01
Payer: MEDICARE

## 2023-02-01 DIAGNOSIS — I48.0 PAF (PAROXYSMAL ATRIAL FIBRILLATION) (HCC): ICD-10-CM

## 2023-02-01 DIAGNOSIS — I50.22 CHRONIC HFREF (HEART FAILURE WITH REDUCED EJECTION FRACTION) (HCC): ICD-10-CM

## 2023-02-01 LAB
LV EF: 53 %
LVEF MODALITY: NORMAL

## 2023-02-01 PROCEDURE — 93308 TTE F-UP OR LMTD: CPT

## 2023-02-03 ENCOUNTER — TELEPHONE (OUTPATIENT)
Dept: NON INVASIVE DIAGNOSTICS | Age: 78
End: 2023-02-03

## 2023-02-03 NOTE — TELEPHONE ENCOUNTER
----- Message from LILIAN Fleming CNP sent at 2/3/2023  2:10 PM EST -----  Please let Roxie Horton known the echo in sinus showed an LVEF of 50-55% and some moderate MR which will need to be observed.  The LVEF of 50-55% is an improvement compared to when she was in AF.      ----- Message -----  From: Anastasiia Zamora Incoming Cardiology Results From [facility]  Sent: 2/3/2023   8:26 AM EST  To: LILIAN Fleming CNP

## 2023-02-10 ENCOUNTER — TELEPHONE (OUTPATIENT)
Dept: NON INVASIVE DIAGNOSTICS | Age: 78
End: 2023-02-10

## 2023-02-11 NOTE — TELEPHONE ENCOUNTER
I received her 14 day Zio Xt today and monitor showed     Predominant underlying rhythm was Sinus Rhythm. 5 Supraventricular Tachycardia runs occurred, the run with the longest lasting 11 beats. 1022 Pauses occurred, the longest lasting 6.9 secs (9 bpm). Junctional Rhythm was present. Isolated SVEs were rare (<1.0%), SVE Couplets were rare (<1.0%), and SVE Triplets were rare (<1.0%). Isolated VEs were rare (<1.0%), and no VE Couplets or VE Triplets were present. Triggered events and reported symptoms were correlated with sinus bradycardia, sinus pauses, junctional rhythm, PACs and PVCs. I called the patient and talked to her on the phone. She denies any recent syncope but reports dizziness yesterday. I told her to stop Toprol XL and Amiodarone for now. Advised against driving. I advised her to go to ED immediately if develops any recurrent dizziness or syncopal episode. I discussed with her indication of pacemaker implantation. Risks, benefits and alternatives were explained She verbalizes understanding and agrees to proceed with the procedure. Will ask office to call her and arrange elective dual chamber pacemaker implantation.

## 2023-02-14 ENCOUNTER — TELEPHONE (OUTPATIENT)
Dept: NON INVASIVE DIAGNOSTICS | Age: 78
End: 2023-02-14

## 2023-02-14 NOTE — TELEPHONE ENCOUNTER
I spoke to Milo Briana, and scheduled her for ppm implant w/ CK on 3/3/23. Procedure instructions given and she verbalized understanding.

## 2023-02-14 NOTE — TELEPHONE ENCOUNTER
----- Message from Umer Hu MD sent at 2/10/2023  7:11 PM EST -----  Please schedule dual chamber pacemaker implantation. Hold Eliquis 48 hours before the procedure. I told her to stop Toprol XL and Amiodarone. I told her not to drive and go to ED right away if she passed out. Thanks.

## 2023-02-24 ENCOUNTER — HOSPITAL ENCOUNTER (OUTPATIENT)
Age: 78
Discharge: HOME | End: 2023-02-24
Payer: MEDICARE

## 2023-02-24 DIAGNOSIS — Z79.899 LONG TERM CURRENT USE OF AMIODARONE: ICD-10-CM

## 2023-02-24 LAB
ALBUMIN SERPL-MCNC: 4.3 G/DL (ref 3.5–5.2)
ALP BLD-CCNC: 93 U/L (ref 35–104)
ALT SERPL-CCNC: 25 U/L (ref 0–32)
ANION GAP SERPL CALCULATED.3IONS-SCNC: 10 MMOL/L (ref 7–16)
AST SERPL-CCNC: 30 U/L (ref 0–31)
BILIRUB SERPL-MCNC: 0.6 MG/DL (ref 0–1.2)
BUN BLDV-MCNC: 17 MG/DL (ref 6–23)
CALCIUM SERPL-MCNC: 9.8 MG/DL (ref 8.6–10.2)
CHLORIDE BLD-SCNC: 101 MMOL/L (ref 98–107)
CO2: 29 MMOL/L (ref 22–29)
CREAT SERPL-MCNC: 1 MG/DL (ref 0.5–1)
GFR SERPL CREATININE-BSD FRML MDRD: 57 ML/MIN/1.73
GLUCOSE BLD-MCNC: 89 MG/DL (ref 74–99)
POTASSIUM SERPL-SCNC: 4.4 MMOL/L (ref 3.5–5)
SODIUM BLD-SCNC: 140 MMOL/L (ref 132–146)
T3 FREE: 1.6 PG/ML (ref 2–4.4)
T4 FREE: 1.7 NG/DL (ref 0.93–1.7)
TOTAL PROTEIN: 7.5 G/DL (ref 6.4–8.3)
TSH SERPL DL<=0.05 MIU/L-ACNC: 4.35 UIU/ML (ref 0.27–4.2)

## 2023-02-24 PROCEDURE — 80053 COMPREHEN METABOLIC PANEL: CPT

## 2023-02-24 PROCEDURE — 84481 FREE ASSAY (FT-3): CPT

## 2023-02-24 PROCEDURE — 84443 ASSAY THYROID STIM HORMONE: CPT

## 2023-02-24 PROCEDURE — 36415 COLL VENOUS BLD VENIPUNCTURE: CPT

## 2023-02-24 PROCEDURE — 84439 ASSAY OF FREE THYROXINE: CPT

## 2023-03-02 NOTE — DISCHARGE INSTRUCTIONS
[facility] Pacemaker Instructions    Resume Eliquis on 3/7/23    Incision Care:  Leave brown Aquacel dressing on for 1 week. Remove aquacel dressing on Friday 3/10/23  Do not remove the steri strips from your incision. They will be removed at your follow up appointment. Keep the incision dry. You may shower; but do not let the water run directly on the incision for 1 week. Check the area daily. Notify the office at [phone] if you develop any redness, swelling, drainage, warmth, or fever greater than 100 degrees. Activity:  You may continue regular activity; but limit strenuous or stretching movements of the arm closest to your pacemaker. Wear the arm immobilizer at all times for 48 hours and then at night for 4 weeks. Avoid pulling yourself up with that arm. Do not raise that elbow higher than shoulder height and do not lift anything weighing more than 2 pounds for 4 weeks. Do not do any activities such as golfing, vacuuming, or mowing the lawn for 4 weeks. Prevent any hard blows to the pacemaker site. Driving: You may drive, if you feel up to it, in 14 days or after your 2 week follow up visit. Start with local/short trips to familiar places. Avoid highway/ high-speed driving for the first few days after you resume driving. DO NOT drive until you have stopped taking prescription pain medications. Special Instructions:  Let your dentist, doctor, or medical specialist know that you have a pacemaker  so precautions can be taken to protect the device. Your device is considered to be MRI conditional; meaning that under certain circumstances, MRI exams may be performed after 6 weeks post implantation  Your pacemaker may set off a metal detector, such as those used in airports and courtrooms. Let security know that you have a pacemaker & show them your card. Remote Monitor:   Many of these monitors have a delay of 3-6 months currently, in some cases an georgina can be used with newer smartphones to provide monitoring services. Please discuss this with the device clinic at your follow up appointment in 2 weeks. ID Card: You will have a temporary ID card until a permanent card is sent to you by the device company. The permanent card will look like a 's license or credit card and should arrive within 8 weeks. Carry your ID card with you at all times        Follow Up: You will be seen on 3/17/23 at 1:00 pm at the [address] [place] [address] for incision check and brief pacemaker evaluation. If you need to reschedule this appointment, call the office at [phone]. [facility]    [address] [place] [address], [address] [place] [address]    [phone]

## 2023-03-03 ENCOUNTER — HOSPITAL ENCOUNTER (OUTPATIENT)
Dept: GENERAL RADIOLOGY | Age: 78
End: 2023-03-03
Attending: INTERNAL MEDICINE
Payer: MEDICARE

## 2023-03-03 ENCOUNTER — ANESTHESIA (OUTPATIENT)
Dept: CARDIAC CATH/INVASIVE PROCEDURES | Age: 78
End: 2023-03-03

## 2023-03-03 ENCOUNTER — ANESTHESIA EVENT (OUTPATIENT)
Dept: CARDIAC CATH/INVASIVE PROCEDURES | Age: 78
End: 2023-03-03

## 2023-03-03 ENCOUNTER — HOSPITAL ENCOUNTER (OUTPATIENT)
Dept: CARDIAC CATH/INVASIVE PROCEDURES | Age: 78
Setting detail: OBSERVATION
Discharge: HOME | End: 2023-03-04
Attending: INTERNAL MEDICINE | Admitting: INTERNAL MEDICINE
Payer: MEDICARE

## 2023-03-03 PROBLEM — I49.5 SINUS NODE DYSFUNCTION (HCC): Status: ACTIVE | Noted: 2023-03-03

## 2023-03-03 PROBLEM — I48.19 PERSISTENT ATRIAL FIBRILLATION (HCC): Status: ACTIVE | Noted: 2022-04-25

## 2023-03-03 PROBLEM — Z95.0 S/P PLACEMENT OF CARDIAC PACEMAKER: Status: ACTIVE | Noted: 2023-03-03

## 2023-03-03 LAB
ANION GAP SERPL CALCULATED.3IONS-SCNC: 10 MMOL/L (ref 7–16)
BASOPHILS ABSOLUTE: 0.07 E9/L (ref 0–0.2)
BASOPHILS RELATIVE PERCENT: 1.1 % (ref 0–2)
BUN BLDV-MCNC: 15 MG/DL (ref 6–23)
CALCIUM SERPL-MCNC: 9.6 MG/DL (ref 8.6–10.2)
CHLORIDE BLD-SCNC: 101 MMOL/L (ref 98–107)
CO2: 30 MMOL/L (ref 22–29)
CREAT SERPL-MCNC: 1.1 MG/DL (ref 0.5–1)
EKG ATRIAL RATE: 227 BPM
EKG Q-T INTERVAL: 454 MS
EKG QRS DURATION: 128 MS
EKG QTC CALCULATION (BAZETT): 517 MS
EKG R AXIS: -52 DEGREES
EKG T AXIS: 83 DEGREES
EKG VENTRICULAR RATE: 78 BPM
EOSINOPHILS ABSOLUTE: 0.22 E9/L (ref 0.05–0.5)
EOSINOPHILS RELATIVE PERCENT: 3.4 % (ref 0–6)
GFR SERPL CREATININE-BSD FRML MDRD: 51 ML/MIN/1.73
GLUCOSE BLD-MCNC: 98 MG/DL (ref 74–99)
HCT VFR BLD CALC: 45.1 % (ref 34–48)
HEMOGLOBIN: 14.6 G/DL (ref 11.5–15.5)
IMMATURE GRANULOCYTES #: 0.01 E9/L
IMMATURE GRANULOCYTES %: 0.2 % (ref 0–5)
LYMPHOCYTES ABSOLUTE: 1.65 E9/L (ref 1.5–4)
LYMPHOCYTES RELATIVE PERCENT: 25.6 % (ref 20–42)
MAGNESIUM: 2.2 MG/DL (ref 1.6–2.6)
MCH RBC QN AUTO: 32.7 PG (ref 26–35)
MCHC RBC AUTO-ENTMCNC: 32.4 % (ref 32–34.5)
MCV RBC AUTO: 101.1 FL (ref 80–99.9)
MONOCYTES ABSOLUTE: 0.5 E9/L (ref 0.1–0.95)
MONOCYTES RELATIVE PERCENT: 7.8 % (ref 2–12)
NEUTROPHILS ABSOLUTE: 4 E9/L (ref 1.8–7.3)
NEUTROPHILS RELATIVE PERCENT: 61.9 % (ref 43–80)
PDW BLD-RTO: 13.6 FL (ref 11.5–15)
PLATELET # BLD: 286 E9/L (ref 130–450)
PMV BLD AUTO: 9.3 FL (ref 7–12)
POTASSIUM SERPL-SCNC: 4.1 MMOL/L (ref 3.5–5)
RBC # BLD: 4.46 E12/L (ref 3.5–5.5)
SODIUM BLD-SCNC: 141 MMOL/L (ref 132–146)
WBC # BLD: 6.5 E9/L (ref 4.5–11.5)

## 2023-03-03 PROCEDURE — 85025 COMPLETE CBC W/AUTO DIFF WBC: CPT

## 2023-03-03 PROCEDURE — C1785 PMKR, DUAL, RATE-RESP: HCPCS

## 2023-03-03 PROCEDURE — 6360000002 HC RX W HCPCS

## 2023-03-03 PROCEDURE — 71045 X-RAY EXAM CHEST 1 VIEW: CPT

## 2023-03-03 PROCEDURE — G0378 HOSPITAL OBSERVATION PER HR: HCPCS

## 2023-03-03 PROCEDURE — 2580000003 HC RX 258: Performed by: INTERNAL MEDICINE

## 2023-03-03 PROCEDURE — 2709999900 HC NON-CHARGEABLE SUPPLY

## 2023-03-03 PROCEDURE — 99223 1ST HOSP IP/OBS HIGH 75: CPT | Performed by: INTERNAL MEDICINE

## 2023-03-03 PROCEDURE — 36415 COLL VENOUS BLD VENIPUNCTURE: CPT

## 2023-03-03 PROCEDURE — C1889 IMPLANT/INSERT DEVICE, NOC: HCPCS

## 2023-03-03 PROCEDURE — 83735 ASSAY OF MAGNESIUM: CPT

## 2023-03-03 PROCEDURE — 6360000002 HC RX W HCPCS: Performed by: INTERNAL MEDICINE

## 2023-03-03 PROCEDURE — 2580000003 HC RX 258: Performed by: NURSE ANESTHETIST, CERTIFIED REGISTERED

## 2023-03-03 PROCEDURE — G0379 DIRECT REFER HOSPITAL OBSERV: HCPCS

## 2023-03-03 PROCEDURE — 80048 BASIC METABOLIC PNL TOTAL CA: CPT

## 2023-03-03 PROCEDURE — 2500000003 HC RX 250 WO HCPCS

## 2023-03-03 PROCEDURE — 33208 INSRT HEART PM ATRIAL & VENT: CPT

## 2023-03-03 PROCEDURE — 3700000000 HC ANESTHESIA ATTENDED CARE

## 2023-03-03 PROCEDURE — 2580000003 HC RX 258

## 2023-03-03 PROCEDURE — 6370000000 HC RX 637 (ALT 250 FOR IP): Performed by: INTERNAL MEDICINE

## 2023-03-03 PROCEDURE — 3700000001 HC ADD 15 MINUTES (ANESTHESIA)

## 2023-03-03 PROCEDURE — 6360000002 HC RX W HCPCS: Performed by: NURSE ANESTHETIST, CERTIFIED REGISTERED

## 2023-03-03 PROCEDURE — C1898 LEAD, PMKR, OTHER THAN TRANS: HCPCS

## 2023-03-03 PROCEDURE — 93005 ELECTROCARDIOGRAM TRACING: CPT | Performed by: INTERNAL MEDICINE

## 2023-03-03 PROCEDURE — C1894 INTRO/SHEATH, NON-LASER: HCPCS

## 2023-03-03 PROCEDURE — 33208 INSRT HEART PM ATRIAL & VENT: CPT | Performed by: INTERNAL MEDICINE

## 2023-03-03 RX ORDER — SODIUM CHLORIDE 9 MG/ML
INJECTION, SOLUTION INTRAVENOUS CONTINUOUS PRN
Status: DISCONTINUED | OUTPATIENT
Start: 2023-03-03 | End: 2023-03-03 | Stop reason: SDUPTHER

## 2023-03-03 RX ORDER — PRAVASTATIN SODIUM 20 MG
20 TABLET ORAL NIGHTLY
Status: DISCONTINUED | OUTPATIENT
Start: 2023-03-03 | End: 2023-03-04 | Stop reason: HOSPADM

## 2023-03-03 RX ORDER — SODIUM CHLORIDE 0.9 % (FLUSH) 0.9 %
5-40 SYRINGE (ML) INJECTION PRN
Status: DISCONTINUED | OUTPATIENT
Start: 2023-03-03 | End: 2023-03-04 | Stop reason: HOSPADM

## 2023-03-03 RX ORDER — DOXYCYCLINE HYCLATE 100 MG
100 TABLET ORAL 2 TIMES DAILY
Qty: 14 TABLET | Refills: 0 | Status: ON HOLD | OUTPATIENT
Start: 2023-03-03 | End: 2023-03-04 | Stop reason: SDUPTHER

## 2023-03-03 RX ORDER — SODIUM CHLORIDE 0.9 % (FLUSH) 0.9 %
5-40 SYRINGE (ML) INJECTION EVERY 12 HOURS SCHEDULED
Status: DISCONTINUED | OUTPATIENT
Start: 2023-03-03 | End: 2023-03-04 | Stop reason: HOSPADM

## 2023-03-03 RX ORDER — FENTANYL CITRATE 50 UG/ML
INJECTION, SOLUTION INTRAMUSCULAR; INTRAVENOUS PRN
Status: DISCONTINUED | OUTPATIENT
Start: 2023-03-03 | End: 2023-03-03 | Stop reason: SDUPTHER

## 2023-03-03 RX ORDER — ACETAMINOPHEN 325 MG/1
650 TABLET ORAL EVERY 4 HOURS PRN
Status: DISCONTINUED | OUTPATIENT
Start: 2023-03-03 | End: 2023-03-04 | Stop reason: HOSPADM

## 2023-03-03 RX ORDER — AMIODARONE HYDROCHLORIDE 200 MG/1
200 TABLET ORAL DAILY
Status: DISCONTINUED | OUTPATIENT
Start: 2023-03-03 | End: 2023-03-03

## 2023-03-03 RX ORDER — LEVOTHYROXINE SODIUM 0.1 MG/1
100 TABLET ORAL DAILY
Status: DISCONTINUED | OUTPATIENT
Start: 2023-03-03 | End: 2023-03-04 | Stop reason: HOSPADM

## 2023-03-03 RX ORDER — M-VIT,TX,IRON,MINS/CALC/FOLIC 27MG-0.4MG
1 TABLET ORAL DAILY
Status: DISCONTINUED | OUTPATIENT
Start: 2023-03-03 | End: 2023-03-04 | Stop reason: HOSPADM

## 2023-03-03 RX ORDER — LISINOPRIL 5 MG/1
5 TABLET ORAL DAILY
Status: DISCONTINUED | OUTPATIENT
Start: 2023-03-03 | End: 2023-03-04 | Stop reason: HOSPADM

## 2023-03-03 RX ORDER — METOPROLOL SUCCINATE 25 MG/1
25 TABLET, EXTENDED RELEASE ORAL 2 TIMES DAILY
Qty: 180 TABLET | Refills: 3 | Status: SHIPPED | OUTPATIENT
Start: 2023-03-03

## 2023-03-03 RX ORDER — CEFAZOLIN SODIUM 1 G/3ML
INJECTION, POWDER, FOR SOLUTION INTRAMUSCULAR; INTRAVENOUS PRN
Status: DISCONTINUED | OUTPATIENT
Start: 2023-03-03 | End: 2023-03-03 | Stop reason: SDUPTHER

## 2023-03-03 RX ORDER — METOPROLOL SUCCINATE 25 MG/1
25 TABLET, EXTENDED RELEASE ORAL 2 TIMES DAILY
Status: DISCONTINUED | OUTPATIENT
Start: 2023-03-03 | End: 2023-03-04 | Stop reason: HOSPADM

## 2023-03-03 RX ORDER — PROPOFOL 10 MG/ML
INJECTION, EMULSION INTRAVENOUS CONTINUOUS PRN
Status: DISCONTINUED | OUTPATIENT
Start: 2023-03-03 | End: 2023-03-03 | Stop reason: SDUPTHER

## 2023-03-03 RX ORDER — SODIUM CHLORIDE 9 MG/ML
INJECTION, SOLUTION INTRAVENOUS PRN
Status: DISCONTINUED | OUTPATIENT
Start: 2023-03-03 | End: 2023-03-04 | Stop reason: HOSPADM

## 2023-03-03 RX ADMIN — SODIUM CHLORIDE: 9 INJECTION, SOLUTION INTRAVENOUS at 13:15

## 2023-03-03 RX ADMIN — FENTANYL CITRATE 50 MCG: 50 INJECTION, SOLUTION INTRAMUSCULAR; INTRAVENOUS at 13:30

## 2023-03-03 RX ADMIN — PROPOFOL 25 MCG/KG/MIN: 10 INJECTION, EMULSION INTRAVENOUS at 13:30

## 2023-03-03 RX ADMIN — LISINOPRIL 5 MG: 5 TABLET ORAL at 17:04

## 2023-03-03 RX ADMIN — ACETAMINOPHEN 650 MG: 325 TABLET ORAL at 17:04

## 2023-03-03 RX ADMIN — CEFAZOLIN 2 G: 1 INJECTION, POWDER, FOR SOLUTION INTRAMUSCULAR; INTRAVENOUS at 13:35

## 2023-03-03 RX ADMIN — METOPROLOL SUCCINATE 25 MG: 25 TABLET, EXTENDED RELEASE ORAL at 20:37

## 2023-03-03 RX ADMIN — CEFAZOLIN 2000 MG: 2 INJECTION, POWDER, FOR SOLUTION INTRAMUSCULAR; INTRAVENOUS at 20:50

## 2023-03-03 RX ADMIN — PROPOFOL 30 MG: 10 INJECTION, EMULSION INTRAVENOUS at 13:40

## 2023-03-03 RX ADMIN — SODIUM CHLORIDE, PRESERVATIVE FREE 10 ML: 5 INJECTION INTRAVENOUS at 20:38

## 2023-03-03 RX ADMIN — FENTANYL CITRATE 75 MCG: 50 INJECTION, SOLUTION INTRAMUSCULAR; INTRAVENOUS at 13:46

## 2023-03-03 RX ADMIN — PRAVASTATIN SODIUM 20 MG: 20 TABLET ORAL at 20:37

## 2023-03-03 ASSESSMENT — PAIN DESCRIPTION - DESCRIPTORS: DESCRIPTORS: SORE

## 2023-03-03 ASSESSMENT — PAIN SCALES - GENERAL
PAINLEVEL_OUTOF10: 5
PAINLEVEL_OUTOF10: 0

## 2023-03-03 ASSESSMENT — PAIN DESCRIPTION - ORIENTATION: ORIENTATION: LEFT

## 2023-03-03 ASSESSMENT — PAIN DESCRIPTION - LOCATION: LOCATION: INCISION;SHOULDER

## 2023-03-03 ASSESSMENT — LIFESTYLE VARIABLES: SMOKING_STATUS: 0

## 2023-03-03 NOTE — H&P
[address] and Vascular [facility] Electrophysiology  History and Physical Examination  PATIENT: Aidan Kline  MEDICAL RECORD NUMBER: [id number]  DATE OF SERVICE:  3/3/2023  ATTENDING ELECTROPHYSIOLOGIST: Raudel Soni MD   REFERRING PHYSICIAN:  Eleni Mcghee MD  CHIEF COMPLAINT: Persistent atrial fibrillation and nonischemic cardiomyopathy    HPI: This is a 65 y.o. female with a history of   Patient Active Problem List   Diagnosis    Embolic stroke involving right middle cerebral artery ([address]    New onset atrial fibrillation ([address]    Hypothyroidism    Cardiomyopathy ([address]    Arthritis    Chronic combined systolic and diastolic heart failure ([address]    First degree atrioventricular block    Hyperlipidemia    Insomnia    Neuralgia    Nonsenile cataract    Nuclear senile cataract    Presbyopia    Seborrheic dermatitis    Subjective visual disturbance    Tear film insufficiency    S/P placement of cardiac pacemaker   who presents to cardiac electrophysiology clinic for Follow up  of persistent atrial fibrillation and nonischemic cardiomyopathy. The patient was seen by Dr. Janessa Forbes in April of 2022 when she presented to the hospital with headache and was found to have right MCA stroke and new onset AF of unknown duration. Echocardiogram on 4/26/22 showed LV EF of 35-40%, mild bileaflet mitral valve prolapse with mild MR and severe LAE. She subsequently underwent nuclear stress test 6/16/22 which showed low risk result and LV EF of 51%. She underwent XANDER and DC-CV on 6/28/22. 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 She had successful DC- Cardioversion but developed ERAF. The patient underwent cardiac MRI which showed moderate MR and LV EF of 42%.  She was seen by Dr. Alverto Wright on 10/21/2022 and was subsequently placed on Amiodaron and underwent repeat DCCV on 11/18/22 with Dr. Suri Griggs who due to bradycardia in the 40's reduced her Toprol to 12.5 mg daily. A Follow up EKG on 11/29/22 showed sinus bradycardia. Today she presents in sinus with a rate of 42 bpm and has noticed intermittent lightheadedness currently while in the seated position typically in the morning hours between 7 AM and noon (lightheaded/near syncopal episodes last a number of seconds occur without prodromal symptoms occur in the seated position have not resulted in full syncope-ongoing since mid December occur every other or every day. He also notes ongoing chest discomfort when walking. She was ambulated in the with a pulse ox with her rates increasing to 67 bpm with mild ambulation. Today the use of a pacemaker was discussed. 3/3/23: The patient is seen in the hospital for elective dual chamber pacemaker implantation. Echocardiogram on 2/1/23 showed LV EF 50-55%, moderate MR and mild to moderate TR. Risks, benefits, and alternatives of permanent pacemaker implantation were discussed in detail today. These risks include but are not limited to bleeding, infection, blood clot, pneumothorax, hemothorax,cardiac valve damage, cardiac perforation and tamponade required emergent thoracotomy, contrast induced nephropathy leading to short or even long term dialysis, vascular injury requiring emergent surgical repair, lead dislodgement, stroke and even death. The patient understands these risks and agrees to proceed with pacemaker implantation.     Patient Active Problem List    Diagnosis Date Noted    S/P placement of cardiac pacemaker 03/03/2023     Priority: Medium    Nonsenile cataract 01/23/2023     Priority: Medium    Nuclear senile cataract 01/23/2023     Priority: Medium    Presbyopia 01/23/2023     Priority: Medium    Subjective visual disturbance 01/23/2023     Priority: Medium    Tear film insufficiency 01/23/2023     Priority: Medium    Chronic combined systolic and diastolic heart failure ([address] 06/28/2022     Priority: Medium    Cardiomyopathy ([address] 2022     Priority: Medium    Embolic stroke involving right middle cerebral artery ([address] 2022     Priority: Medium    New onset atrial fibrillation ([facility] 2022     Priority: Medium    Hypothyroidism 2022     Priority: Medium    Neuralgia 2019     Priority: Medium    Insomnia 2016     Priority: Medium    Arthritis 2009     Priority: Medium    Seborrheic dermatitis 2007     Priority: Medium    Hyperlipidemia 2003     Priority: Medium    First degree atrioventricular block 1987     Priority: Medium       Past Medical History:   Diagnosis Date    Arthritis     Atrial fibrillation ([facility] 2022    Cardiomyopathy ([facility]     new onset    Hyperlipidemia     Hypertension     hypothyroidism     Ischemic stroke ([facility] 2022    pt denies defecits       Family History   Problem Relation Age of Onset    Stroke Mother     Atrial Fibrillation Mother     Pneumonia Father     Heart Failure Brother        Social History     Tobacco Use    Smoking status: Former     Packs/day: 1.00     Years: 15.00     Pack years: 15.00     Types: Cigarettes     Quit date:      Years since quittin.1     Passive exposure: Past    Smokeless tobacco: Never   Substance Use Topics    Alcohol use: Yes     Comment: occasionally       Current Outpatient Medications   Medication Sig Dispense Refill    lisinopril (PRINIVIL;ZESTRIL) 5 MG tablet take 1 tablet by mouth once daily 90 tablet 3    metoprolol succinate (TOPROL XL) 25 MG extended release tablet Take 0.5 tablets by mouth daily 90 tablet 3    apixaban (ELIQUIS) 5 MG TABS tablet Take 1 tablet by mouth 2 times daily 60 tablet 5    levothyroxine (SYNTHROID) 100 MCG tablet take 1 tablet by mouth once daily      pravastatin (PRAVACHOL) 20 MG tablet take 1 tablet by mouth once daily      triamcinolone (KENALOG) 0.1 % cream APPLY TO TOPICALLY TO AFFECTED AREA TWO TIMES DAILY      amiodarone (CORDARONE) 200 MG tablet Take 1 tablet by mouth daily 90 tablet 1    Multiple Vitamins-Minerals (THERAPEUTIC MULTIVITAMIN-MINERALS) tablet Take 1 tablet by mouth daily       No current facility-administered medications for this encounter. No Known Allergies    ROS:   Constitutional: Negative for fever, activity change and appetite change. HENT: Negative for epistaxis. Eyes: Negative for diploplia, blurred vision. Respiratory: Negative for cough, chest tightness, shortness of breath and wheezing. Cardiovascular: pertinent positives in HPI  Gastrointestinal: Negative for abdominal pain and blood in stool. All other review of systems are negative     PHYSICAL EXAM:   Vitals:    03/03/23 1141   BP: (!) 155/93   Pulse: (!) 102   Resp: 17   Temp: 98.1 °F (36.7 °C)   SpO2: 99%   Weight: 124 lb (56.2 kg)   Height: 5' 8\" (1.727 m)        Constitutional: Well-developed, no acute distress  Eyes: conjunctivae normal, no xanthelasma   Ears, Nose, Throat: oral mucosa moist, no cyanosis   CV: no JVD. Bradycardic regular . No murmurs, rubs, or gallops. PMI is nondisplaced  Lungs: clear to auscultation bilaterally, normal respiratory effort without used of accessory muscles  Abdomen: soft, non-tender, bowel sounds present, no masses or hepatomegaly   Musculoskeletal: no digital clubbing, no edema   Skin: warm, no rashes     I have personally reviewed the laboratory, cardiac diagnostic and radiographic testing as outlined below:    Data:    Recent Labs     03/03/23  1145   WBC 6.5   HGB 14.6   HCT 45.1        Recent Labs     03/03/23  1145      K 4.1      CO2 30*   BUN 15   CREATININE 1.1*   CALCIUM 9.6      Lab Results   Component Value Date/Time    MG 2.2 03/03/2023 11:45 AM     No results for input(s): TSH in the last 72 hours. No results for input(s): INR in the last 72 hours. CXR 4/25/22:   FINDINGS:   Borderline cardiomegaly. Normal pulmonary vascularity. Lungs are clear. Neither costophrenic angle is blunted.   There is calcification of the mitral   annulus. Impression   Borderline cardiomegaly and calcification at the mitral annulus. EKG 03/03/23 : Sinus rhythm rate 42 bpm, CA 380ms QRS 136ms Qtc 483ms  please see scan in Cardiology. Echocardiogram 2/3/23:  Findings      Left Ventricle   Left ventricle is mildly dilated. LVEDD 5.6 cm. LV systolic function is low normal.   Ejection fraction is visually estimated at 50-55%. Indeterminate diastolic function. No regional wall motion abnormalities seen. Right Ventricle   Normal right ventricular size and function. Left Atrium   The left atrium is severely dilated. MADAI 67 ml/m2. Right Atrium   Moderately enlarged right atrium. Mitral Valve   Moderate mitral annular calcification. Bileaflet mitral valve prolapse. Thickened mitral valve leaflets. Moderate mitral regurgitation is present directed posteriorly. Tricuspid Valve   The tricuspid valve appears structurally normal.   Mild to moderate tricuspid regurgitation. RVSP is 33 mmHg. Miscellaneous   Normal Inferior Vena Cava diameter and respiratory variation. Conclusions      Summary   Limited echo to assess LVEF. LV systolic function is low normal.   Ejection fraction is visually estimated at 50-55%. Bileaflet mitral valve prolapse. Moderate mitral regurgitation is present directed posteriorly. Mild to moderate tricuspid regurgitation. Signature      ----------------------------------------------------------------   Electronically signed by Elmira Manning MD(Interpreting   physician) on 02/03/2023 08:26 AM   ----------------------------------------------------------------    Echocardiogram 4/26/22: Findings      Left Ventricle   Normal left ventricular size. LV systolic function is moderately reduced. Ejection fraction is visually estimated at 35-40%. Abnormal diastolic function. No regional wall motion abnormalities seen.    Mild left ventricular concentric hypertrophy noted. Right Ventricle   The right ventricle was not clearly visualized. Left Atrium   The left atrium is severely dilated. MADAI 84 ml/m2. The interatrial septum appears intact. Agitated saline injected for shunt evaluation; no obvious shunt but   visualization poor and can not be excluded. Right Atrium   Right Atrium is not clearly visualized. Mitral Valve   Moderate mitral annular calcification. Mild bileaflet mitral valve prolapse. No evidence of mitral valve stenosis. Mild mitral regurgitation. Tricuspid Valve   The tricuspid valve appears structurally normal.   Mild tricuspid regurgitation. RVSP is at least 23 mmHg. Aortic Valve   Individual aortic valve leaflets are not clearly visualized. The aortic valve is probably trileaflet. No hemodynamically significant aortic stenosis is present. No evidence of aortic valve regurgitation. Pulmonic Valve   The pulmonic valve was not well visualized. No evidence of pulmonic valve stenosis. No evidence of any pulmonic regurgitation. Pericardial Effusion   No evidence of pericardial effusion. Aorta   Aortic root dimension within normal limits. Miscellaneous   Normal Inferior Vena Cava diameter and respiratory variation. Conclusions      Summary   Technically difficult study - limited visualization due to breast   implants. Normal left ventricular size. LV systolic function is moderately reduced. Ejection fraction is visually estimated at 35-40%. Abnormal diastolic function. No regional wall motion abnormalities seen. Mild left ventricular concentric hypertrophy noted. The left atrium is severely dilated. Agitated saline injected for shunt evaluation; no obvious shunt but   visualization poor and can not be excluded. Mild tricuspid regurgitation.       Signature      ----------------------------------------------------------------   Electronically signed by Mikaela Naylor MD(Interpreting   physician) on 04/26/2022 05:02 PM   ----------------------------------------------------------------    XANDER 6/28/22: Findings      Left Ventricle   Normal left ventricular size and systolic function. Ejection fraction is visually estimated at 45-50%. Right Ventricle   Normal right ventricular size and function. Left Atrium   Dilated left atrium. No evidence of thrombus within left atrium or appendage. Mild spontaneous echo contrast was present in LA appendage. Small PFO noted by color Doppler. Agitated saline injected for shunt evaluation. No shunting of bubbles. Right Atrium   Normal right atrial size. No evidence of thrombus or mass in the right atrium. 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 Severe bileaflet prolapse, most prominent at A3/P3. No evidence of mitral valve stenosis. Moderate mitral regurgitation. ERO 0.3 cm2. RV 48 mL. Tricuspid Valve   The tricuspid valve appears structurally normal.   Mild tricuspid regurgitation. Aortic Valve   Structurally normal aortic valve. The aortic valve is trileaflet. No hemodynamically significant aortic stenosis is present. Mild aortic valve regurgitation. Pulmonic Valve   Pulmonic valve is structurally normal.   Physiologic and/or trace pulmonic regurgitation present. Pericardial Effusion   No evidence of pericardial effusion. Aorta   Mild atherosclerotic disease of the descending thoracic aorta and arch. Miscellaneous   LUPV: Systolic blunting   LLPV: Systolic blunting   RUPV: Systolic blunting   RLPV: Systolic blunting      Conclusions      Summary   Ejection fraction is visually estimated at 45-50%. No evidence of thrombus within left atrium or appendage. Small PFO noted by color Doppler. 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 Severe bileaflet prolapse, most prominent at A3/P3. Moderate mitral regurgitation. Signature      ----------------------------------------------------------------   Electronically signed by Desmond Travis MD(Interpreting   physician) on 06/28/2022 01:47 PM   ----------------------------------------------------------------     Stress Test 6/16/22:   FINDINGS: The overall quality of the study was good. Left ventricular cavity size was noted to be normal.     Rotational analog analysis demonstrated soft tissue diaphragmatic attenuation. A moderate defect was present in the basal inferoseptal wall(s) that was  small size on the post regadeson images                                                                     The resting images show no change. Gated SPECT left ventricular ejection fraction was calculated to be 51%, with normal myocardial thickening and wall motion. Impression:    Electrocardiographically normal regadenoson infusion with a clinically  non-ischemic response when compared to the baseline ECG  Myocardial perfusion imaging was normal with attenuation artifact. Overall left ventricular systolic function was normal without regional wall motion abnormalities. 4. Low risk general pharmacologic stress test.     Thank you for sending your patient to this [employer]. Electronically signed by Leeann Ayers MD on 6/16/2022 at 12:27 PM     Cardiac MRI 8/1/22:       14 day cardiac monitor 2/13/23:  Predominant underlying rhythm was Sinus Rhythm. 5 Supraventricular  Tachycardia runs occurred, the run with the longest lasting 11  beats. 1022 Pauses occurred, the longest lasting 6.9 secs (9 bpm). Junctional Rhythm was present. Isolated SVEs were rare (<1.0%), SVE  Couplets were rare (<1.0%), and SVE Triplets were rare (<1.0%). Isolated VEs were rare (<1.0%), and no VE Couplets or VE Triplets  were present.  Triggered events and reported symptoms were  correlated with sinus bradycardia, sinus pauses, junctional  rhythm, PACs and PVCs. I have independently reviewed all of the ECGs and rhythm strips per above     Assessment/Plan: This is a 65 y.o. female with a history of   Patient Active Problem List   Diagnosis    Embolic stroke involving right middle cerebral artery ([address]    New onset atrial fibrillation ([address]    Hypothyroidism    Cardiomyopathy ([address]    Arthritis    Chronic combined systolic and diastolic heart failure ([address]    First degree atrioventricular block    Hyperlipidemia    Insomnia    Neuralgia    Nonsenile cataract    Nuclear senile cataract    Presbyopia    Seborrheic dermatitis    Subjective visual disturbance    Tear film insufficiency    S/P placement of cardiac pacemaker    who presents with persistent atrial fibrillation. 1. Persistent atrial fibrillation  - Diagnosed with new onset AF of unknown duration in April 2022.   - [id number] of 5  - Underwent XANDER and successful DC-CV on 6/28/22 with ERAF.   - On Toprol XL for rate control.  - On Eliquis for stroke risk reduction.  - Amiodarone started 10/2022 and Successful DCCV 11/8/22  - Presents in AF with CVR  - Re-education on importance of well controlled HTN (goal BP < 130/80), adequate weight control (goal BMI of < 27), physical activity consisting of moderate cardiopulmonary exercise up to a goal of 250 min/wk, daily compliance with CPAP in treating sleep apnea, smoking cessation and limited ETOH intake. 2. Nonischemic cardiomyopathy and chronic heart failure with recovered LV function  - Echocardiogram on 4/26/22 showed LV EF of 35-40%. - Nuclear stress test 6/16/22 which showed low risk result and LV EF of 51%. - Cardiac MRI 8/1/22 showed LV EF of 42%.    - Echo 2/3/23 showed LV EF 50-55%  - Compensated and euvolemic.  - NYHA class II-III, ACC/AHA stage C.    3. Sinus node dysfunction   - Asymptomatic   - Limiting titration of BB, Toprol reduced from 25 to 12.5 mg post DCCV 11/2022.  - 14 day cardiac monitor 2/13/23 showed 1022 Pauses occurred, the longest lasting 6.9 secs (9 bpm). Triggered events and reported symptoms were correlated with sinus bradycardia, sinus pauses, junctional rhythm, PACs and PVCs. - Risks, benefits, and alternatives of permanent pacemaker implantation were discussed in detail today. These risks include but are not limited to bleeding, infection, blood clot, pneumothorax, hemothorax,cardiac valve damage, cardiac perforation and tamponade required emergent thoracotomy, contrast induced nephropathy leading to short or even long term dialysis, vascular injury requiring emergent surgical repair, lead dislodgement, stroke and even death. The patient understands these risks and agrees to proceed with pacemaker implantation. 4. Valvular heart disease  - Echocardiogram on 4/26/22 showed mild bileaflet mitral valve prolapse with mild MR.   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  - Cardiac MRI 8/1/22 showed moderate MR.  - TTE 2/1/23 showed moderate MR    5. CVA  - Diagnosed with right MCA stroke in April 2022.  - On Eliquis. 6. Hyperlipidemia  - On Pravachol. 7. Hypothyroidism  - On Synthroid. Recommendations: Will proceed with dual chamber pacemaker implantation. Follow up after the procedure. Encouraged the patient to call the office for any questions or concerns. Thank you for allowing me to participate in your patient's care. Please call me if there are any questions or concerns.       Pierre Boss MD  Cardiac Electrophysiology  [address]   [facility] [place]: [facility]  1:24 PM  3/3/2023

## 2023-03-03 NOTE — OP NOTE
[address] [facility]  Procedure Report  PATIENT: Herber John  MEDICAL RECORD NUMBER: [id number]  DATE OF PROCEDURE:  3/3/2023  ATTENDING ELECTROPHYSIOLOGIST:Armando Almaraz MD  REFERRING PHYSICIAN: Dr. Anthony Castañeda    Procedure Performed:  1. Insertion of Dual Chamber Permanent Pacemaker (Medtronic- MRI conditional)  2. Left upper extremity venography  3. Fluoroscopy    Indication for Procedure:  1. Sinus node dysfunction. 2. Persistent atrial atrial fibrillation    Flouroscopy: 67.1 min  Complications: none immediately apparent  EBL: minimal  Specimens: none  Contrast: 20 cc    FINDINGS:  Implanted device information:  1. Pulse Generator is a Medtronic. Serial # [id number]  Placement: Left pectoral subcutaneous  Date of implant: 3/3/2023  2. Right atrial lead parameters are as follows:  Medtronic Model # [id number]. Serial # [id number]  Lead position: RA  Date of implant: 3/3/2023  P-waves: 0.9 mV  Pacing threshold: AF  Impedance: 437 ohms. 3. Right ventricular lead parameters are as follows:  Medtronic Model [id number]. Serial # [id number]  Lead position: RV  Date of implant: 3/3/2023  R-waves: 10.1 mV  Pacing threshold: 0.5 V at 0.4 ms. Impedance: 722 ohms. 4. Bradycardia parameters:  Mode: AAIR <-> DDDR  Base Rate: 60  AV delay: 180/150  Max Tracking Rate: 130    DETAILS OF THE OPERATION: The risks, benefits, alternatives of the procedure were explained to patient and family. They consented and agreed to proceed. Written consent was obtained in the chart. Blood products are not routinely needed for such procedures. The patient was brought to the Electrophysiology lab in a fasting and non-sedated state. The patient had electrocardiographic and hemodynamic monitoring equipment placed. During the case the patient was under the care of an anesthesiologist/CRNA team for sedation and hemodynamic monitoring.  A final time out was performed prior to beginning the procedure. The patient was prepped and draped in usual sterile fashion. The left subclavian venogram was performed and showed patent subclavian vein. Twenty mL of 2% lidocaine was used to anesthetize the left pectoral area. Using a #10 blade a 4-cm incision was made below the left clavicle. Using combination of manual dissection and electrocautery, the pacemaker pocket was created to approximate the size of the patient's device. Hemostasis was achieved with electrocautery and confirmed visually. Using a modified Seldinger technique and a fluroscopic guidance, 2 guide wires were placed in the left subclavian vein. Over one of the short J-tipped guide wire, a 7-Fr Safe-sheath was passed. Through this, the right ventricular lead was advanced without difficulty to the right ventricular apical septum using a combination of straight and curved stylets and under fluoroscopic guidance. Mapping of the lead was performed and the lead parameters were deemed to be adequate. The lead was then actively fixated in place. The 7-Fr Safe-sheath was then removed. The lead was then sewn to the pre-pectoral fascia using 0 Ethibond sutures, suturing over the suture sleeve. Over the retained guide-wire an additional 7-Malagasy sheath was passed. Through this, the right atrial lead was advanced with difficulty, due to the patient's abnormal anatomy, to the right atrial appendage using a combination of straight and curved stylets and under fluoroscopic guidance. Mapping of the lead was performed. Lead parameters were deemed to be adequate and lead was then actively fixated in place. The atrial lead was repositioned once due to lead dislodgement. The 7-Fr Safe- sheath was then removed. The lead was then sewn to the pre-pectoral fascia using 0 Ethibond sutures, suturing over the suture sleeve. The device pocket was then irrigated with antibiotic solution.  The leads were then attached to the appropriate binding posts on the header of the device. The set screws were then tightened with a torque wrench. Tug tests were performed on both leads to insure they are adequately fixated. The device and the leads were then placed within the Tyrx antibiotic pouch and were placed within the newly formed device pocket. Lead parameters were then rechecked via the device and deemed to be adequate. The pulse generator was also tied to the pre-pectoral fascia using 0-Ethibond. Surgiflo was injected into the pocket to ensure hemostasis. The incision was closed with 3 layers of absorbable suture, Vicryl. The deepest of which was a 2-0 interrupted stitch followed by a 2nd layer of 3-0 running stitch performed perpendicular to the deep layer and, lastly, a 4-0 subcuticular stitch. The skin was then prepped with benzoin solution, Steri-Strips, and island dressing were then applied. At the end of the case, the patient was hemodynamically stable and under the care of the anesthesiologist, the patient was brought back to the recovery area for post procedural monitoring. ASSESSMENT:  1. Successful implantation of a Medtronic dual chamber permanent pacemaker. RECOMMENDATIONS:  1. Stat portable chest x-ray to rule out pneumothorax and check lead placement now and tomorrow morning. 2. EKG to be performed now. 3. Post-procedural monitoring in the recovery area. 4. The patient will be observed overnight on Telemetry. 5. The patient will receive 24-hours of lyly-operative intravenous antibiotics. 6. The patient's device will be interrogated in the morning by a representative of the device . 7. The patient is to follow-up in device clinic within 2 weeks upon discharge. 8. The patient is advised follow all post-operative device and wound care instructions as per the information provided in the pre-operative clinic.     Nimisha Reyes MD  Cardiac Electrophysiology  [facility]  [facility] [place]: [place] [facility]  1:37 PM  3/3/2023

## 2023-03-03 NOTE — ANESTHESIA PRE PROCEDURE
Department of Anesthesiology  Preprocedure Note       Name:  Danilo Villa   Age:  65 y.o.  :  1945                                          MRN:  [id number]         Date:  3/3/2023      Surgeon: Dimas Landis    Procedure: IMPLANT PPM, VENOGRAM    Medications prior to admission:   Prior to Admission medications    Medication Sig Start Date End Date Taking?  Authorizing Provider   lisinopril (PRINIVIL;ZESTRIL) 5 MG tablet take 1 tablet by mouth once daily 22   Jonathan Owens MD   metoprolol succinate (TOPROL XL) 25 MG extended release tablet Take 0.5 tablets by mouth daily 22   Dilshad Hart DO   apixaban (ELIQUIS) 5 MG TABS tablet Take 1 tablet by mouth 2 times daily 22   Jonathan Owens MD   levothyroxine (SYNTHROID) 100 MCG tablet take 1 tablet by mouth once daily 10/26/22   Historical Provider, MD   pravastatin (PRAVACHOL) 20 MG tablet take 1 tablet by mouth once daily 10/4/22   Historical Provider, MD   triamcinolone (KENALOG) 0.1 % cream APPLY TO TOPICALLY TO AFFECTED AREA TWO TIMES DAILY 22   Historical Provider, MD   amiodarone (CORDARONE) 200 MG tablet Take 1 tablet by mouth daily 22   Brooke Prater MD   Multiple Vitamins-Minerals (THERAPEUTIC MULTIVITAMIN-MINERALS) tablet Take 1 tablet by mouth daily    Historical Provider, MD       Current medications:    Current Outpatient Medications   Medication Sig Dispense Refill    lisinopril (PRINIVIL;ZESTRIL) 5 MG tablet take 1 tablet by mouth once daily 90 tablet 3    metoprolol succinate (TOPROL XL) 25 MG extended release tablet Take 0.5 tablets by mouth daily 90 tablet 3    apixaban (ELIQUIS) 5 MG TABS tablet Take 1 tablet by mouth 2 times daily 60 tablet 5    levothyroxine (SYNTHROID) 100 MCG tablet take 1 tablet by mouth once daily      pravastatin (PRAVACHOL) 20 MG tablet take 1 tablet by mouth once daily      triamcinolone (KENALOG) 0.1 % cream APPLY TO TOPICALLY TO AFFECTED AREA TWO TIMES DAILY      amiodarone (CORDARONE) 200 MG tablet Take 1 tablet by mouth daily 90 tablet 1    Multiple Vitamins-Minerals (THERAPEUTIC MULTIVITAMIN-MINERALS) tablet Take 1 tablet by mouth daily       No current facility-administered medications for this encounter.        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    New onset atrial fibrillation (HCC) I48.91    Hypothyroidism E03.9    Cardiomyopathy ([facility] I42.9    Arthritis M19.90    Chronic combined systolic and diastolic heart failure (HCC) I50.42    First degree atrioventricular block I44.0    Hyperlipidemia E78.5    Insomnia G47.00    Neuralgia M79.2    Nonsenile cataract H26.9    Nuclear senile cataract H25.10    Presbyopia H52.4    Seborrheic dermatitis L21.9    Subjective visual disturbance H53.10    Tear film insufficiency H04.129    S/P placement of cardiac pacemaker Z95.0       Past Medical History:        Diagnosis Date    Arthritis     Atrial fibrillation ([facility] 2022    Cardiomyopathy ([facility]     new onset    Hyperlipidemia     Hypertension     hypothyroidism     Ischemic stroke ([facility] 2022    pt denies defecits       Past Surgical History:        Procedure Laterality Date    BREAST ENHANCEMENT SURGERY         Social History:    Social History     Tobacco Use    Smoking status: Former     Packs/day: 1.00     Years: 15.00     Pack years: 15.00     Types: Cigarettes     Quit date:      Years since quittin.     Passive exposure: Past    Smokeless tobacco: Never   Substance Use Topics    Alcohol use: Yes     Comment: occasionally                                Counseling given: Not Answered      Vital Signs (Current):   Vitals:    23 1141   BP: (!) 155/93   Pulse: (!) 102   Resp: 17   Temp: 36.7 °C (98.1 °F)   SpO2: 99%   Weight: 124 lb (56.2 kg)   Height: 5' 8\" (1.727 m) BP Readings from Last 3 Encounters:   03/03/23 (!) 155/93   01/23/23 112/64   11/18/22 (!) 111/51       NPO Status:  >8 HRS                                                                               BMI:   Wt Readings from Last 3 Encounters:   03/03/23 124 lb (56.2 kg)   01/23/23 128 lb 6.4 oz (58.2 kg)   11/18/22 120 lb (54.4 kg)     Body mass index is 18.85 kg/m². CBC:   Lab Results   Component Value Date/Time    WBC 6.5 03/03/2023 11:45 AM    RBC 4.46 03/03/2023 11:45 AM    HGB 14.6 03/03/2023 11:45 AM    HCT 45.1 03/03/2023 11:45 AM    .1 03/03/2023 11:45 AM    RDW 13.6 03/03/2023 11:45 AM     03/03/2023 11:45 AM       CMP:   Lab Results   Component Value Date/Time     03/03/2023 11:45 AM    K 4.1 03/03/2023 11:45 AM    K 4.4 04/25/2022 10:10 AM     03/03/2023 11:45 AM    CO2 30 03/03/2023 11:45 AM    BUN 15 03/03/2023 11:45 AM    CREATININE 1.1 03/03/2023 11:45 AM    GFRAA >60 04/26/2022 04:35 AM    LABGLOM 51 03/03/2023 11:45 AM    GLUCOSE 98 03/03/2023 11:45 AM    PROT 7.5 02/24/2023 11:14 AM    CALCIUM 9.6 03/03/2023 11:45 AM    BILITOT 0.6 02/24/2023 11:14 AM    ALKPHOS 93 02/24/2023 11:14 AM    AST 30 02/24/2023 11:14 AM    ALT 25 02/24/2023 11:14 AM       POC Tests: No results for input(s): POCGLU, POCNA, POCK, POCCL, POCBUN, POCHEMO, POCHCT in the last 72 hours.     Coags: No results found for: PROTIME, INR, APTT    HCG (If Applicable): No results found for: PREGTESTUR, PREGSERUM, HCG, HCGQUANT     ABGs: No results found for: PHART, PO2ART, VKU3YIA, TNS2OGO, BEART, L8ELJXDE     Type & Screen (If Applicable):  No results found for: LABABO, LABRH    Drug/Infectious Status (If Applicable):  No results found for: HIV, HEPCAB    COVID-19 Screening (If Applicable):   Lab Results   Component Value Date/Time    COVID19 Not Detected 01/31/2022 09:23 AM           Anesthesia Evaluation  Patient summary reviewed and Nursing notes reviewed no history of anesthetic complications:   Airway: Mallampati: II  TM distance: >3 FB   Neck ROM: full  Mouth opening: > = 3 FB   Dental:          Pulmonary: breath sounds clear to auscultation      (-) not a current smoker                           Cardiovascular:  Exercise tolerance: poor (<4 METS),   (+) hypertension:, dysrhythmias: atrial fibrillation, CHF: systolic and diastolic,     Pacemaker: TVP. ECG reviewed      Echocardiogram reviewed  Stress test reviewed  Cleared by cardiology              Neuro/Psych:   (+) CVA:,              ROS comment: NEURALGIA GI/Hepatic/Renal: Neg GI/Hepatic/Renal ROS            Endo/Other:    (+) hypothyroidism: arthritis:., .          Pt had no PAT visit       Abdominal:       Abdomen: soft. Vascular:   + PVD, aortic or cerebral, . Other Findings:           Anesthesia Plan      MAC     ASA 3       Induction: intravenous. Anesthetic plan and risks discussed with patient. Use of blood products discussed with patient whom. Plan discussed with attending.                     Ashley Ashley, APRN - CRNA   3/3/2023

## 2023-03-04 ENCOUNTER — APPOINTMENT (OUTPATIENT)
Dept: GENERAL RADIOLOGY | Age: 78
End: 2023-03-04
Attending: INTERNAL MEDICINE
Payer: MEDICARE

## 2023-03-04 VITALS
DIASTOLIC BLOOD PRESSURE: 59 MMHG | SYSTOLIC BLOOD PRESSURE: 119 MMHG | WEIGHT: 124 LBS | TEMPERATURE: 97.6 F | RESPIRATION RATE: 12 BRPM | HEART RATE: 61 BPM | OXYGEN SATURATION: 100 % | BODY MASS INDEX: 18.79 KG/M2 | HEIGHT: 68 IN

## 2023-03-04 LAB
ANION GAP SERPL CALCULATED.3IONS-SCNC: 11 MMOL/L (ref 7–16)
BUN BLDV-MCNC: 14 MG/DL (ref 6–23)
CALCIUM SERPL-MCNC: 8.8 MG/DL (ref 8.6–10.2)
CHLORIDE BLD-SCNC: 104 MMOL/L (ref 98–107)
CO2: 25 MMOL/L (ref 22–29)
CREAT SERPL-MCNC: 0.9 MG/DL (ref 0.5–1)
GFR SERPL CREATININE-BSD FRML MDRD: >60 ML/MIN/1.73
GLUCOSE BLD-MCNC: 94 MG/DL (ref 74–99)
HCT VFR BLD CALC: 35.3 % (ref 34–48)
HEMOGLOBIN: 11.9 G/DL (ref 11.5–15.5)
MAGNESIUM: 2 MG/DL (ref 1.6–2.6)
MCH RBC QN AUTO: 32.2 PG (ref 26–35)
MCHC RBC AUTO-ENTMCNC: 33.7 % (ref 32–34.5)
MCV RBC AUTO: 95.7 FL (ref 80–99.9)
PDW BLD-RTO: 13.6 FL (ref 11.5–15)
PLATELET # BLD: 233 E9/L (ref 130–450)
PMV BLD AUTO: 10.1 FL (ref 7–12)
POTASSIUM SERPL-SCNC: 4.8 MMOL/L (ref 3.5–5)
RBC # BLD: 3.69 E12/L (ref 3.5–5.5)
SODIUM BLD-SCNC: 140 MMOL/L (ref 132–146)
WBC # BLD: 6.1 E9/L (ref 4.5–11.5)

## 2023-03-04 PROCEDURE — 80048 BASIC METABOLIC PNL TOTAL CA: CPT

## 2023-03-04 PROCEDURE — G0378 HOSPITAL OBSERVATION PER HR: HCPCS

## 2023-03-04 PROCEDURE — 99024 POSTOP FOLLOW-UP VISIT: CPT | Performed by: INTERNAL MEDICINE

## 2023-03-04 PROCEDURE — 85027 COMPLETE CBC AUTOMATED: CPT

## 2023-03-04 PROCEDURE — 2580000003 HC RX 258: Performed by: INTERNAL MEDICINE

## 2023-03-04 PROCEDURE — 83735 ASSAY OF MAGNESIUM: CPT

## 2023-03-04 PROCEDURE — 6360000002 HC RX W HCPCS: Performed by: INTERNAL MEDICINE

## 2023-03-04 PROCEDURE — 71045 X-RAY EXAM CHEST 1 VIEW: CPT

## 2023-03-04 PROCEDURE — 6370000000 HC RX 637 (ALT 250 FOR IP): Performed by: INTERNAL MEDICINE

## 2023-03-04 PROCEDURE — 93280 PM DEVICE PROGR EVAL DUAL: CPT | Performed by: INTERNAL MEDICINE

## 2023-03-04 PROCEDURE — 36415 COLL VENOUS BLD VENIPUNCTURE: CPT

## 2023-03-04 RX ORDER — METOPROLOL SUCCINATE 25 MG/1
25 TABLET, EXTENDED RELEASE ORAL 2 TIMES DAILY
Qty: 60 TABLET | Refills: 3 | Status: SHIPPED | OUTPATIENT
Start: 2023-03-04

## 2023-03-04 RX ORDER — DOXYCYCLINE HYCLATE 100 MG
100 TABLET ORAL 2 TIMES DAILY
Qty: 14 TABLET | Refills: 0 | Status: SHIPPED | OUTPATIENT
Start: 2023-03-04 | End: 2023-03-11

## 2023-03-04 RX ADMIN — SODIUM CHLORIDE, PRESERVATIVE FREE 10 ML: 5 INJECTION INTRAVENOUS at 09:30

## 2023-03-04 RX ADMIN — MULTIPLE VITAMINS W/ MINERALS TAB 1 TABLET: TAB at 09:33

## 2023-03-04 RX ADMIN — SODIUM CHLORIDE, PRESERVATIVE FREE 10 ML: 5 INJECTION INTRAVENOUS at 05:26

## 2023-03-04 RX ADMIN — LISINOPRIL 5 MG: 5 TABLET ORAL at 09:33

## 2023-03-04 RX ADMIN — LEVOTHYROXINE SODIUM 100 MCG: 0.1 TABLET ORAL at 05:26

## 2023-03-04 RX ADMIN — METOPROLOL SUCCINATE 25 MG: 25 TABLET, EXTENDED RELEASE ORAL at 09:33

## 2023-03-04 RX ADMIN — ACETAMINOPHEN 650 MG: 325 TABLET ORAL at 04:01

## 2023-03-04 RX ADMIN — CEFAZOLIN 2000 MG: 2 INJECTION, POWDER, FOR SOLUTION INTRAMUSCULAR; INTRAVENOUS at 05:25

## 2023-03-04 RX ADMIN — ACETAMINOPHEN 650 MG: 325 TABLET ORAL at 09:45

## 2023-03-04 ASSESSMENT — PAIN DESCRIPTION - DESCRIPTORS
DESCRIPTORS: ACHING
DESCRIPTORS: ACHING

## 2023-03-04 ASSESSMENT — PAIN SCALES - GENERAL
PAINLEVEL_OUTOF10: 0
PAINLEVEL_OUTOF10: 3
PAINLEVEL_OUTOF10: 5

## 2023-03-04 ASSESSMENT — PAIN DESCRIPTION - LOCATION
LOCATION: INCISION
LOCATION: INCISION;BACK

## 2023-03-04 ASSESSMENT — PAIN - FUNCTIONAL ASSESSMENT: PAIN_FUNCTIONAL_ASSESSMENT: ACTIVITIES ARE NOT PREVENTED

## 2023-03-04 ASSESSMENT — PAIN DESCRIPTION - ORIENTATION
ORIENTATION: LEFT
ORIENTATION: MID;LEFT

## 2023-03-04 NOTE — PLAN OF CARE
Problem: Chronic Conditions and Co-morbidities  Goal: Patient's chronic conditions and co-morbidity symptoms are monitored and maintained or improved  3/4/2023 1554 by Krysta Baron RN  Outcome: Adequate for Discharge  3/4/2023 0449 by Frank Verma RN  Outcome: Progressing  Flowsheets (Taken 3/3/2023 2000)  Care Plan - Patient's Chronic Conditions and Co-Morbidity Symptoms are Monitored and Maintained or Improved:   Monitor and assess patient's chronic conditions and comorbid symptoms for stability, deterioration, or improvement   Collaborate with multidisciplinary team to address chronic and comorbid conditions and prevent exacerbation or deterioration   Update acute care plan with appropriate goals if chronic or comorbid symptoms are exacerbated and prevent overall improvement and discharge     Problem: Discharge Planning  Goal: Discharge to home or other facility with appropriate resources  3/4/2023 1554 by Krysta Baron RN  Outcome: Adequate for Discharge  3/4/2023 0449 by Frank Verma RN  Outcome: Progressing  Flowsheets (Taken 3/3/2023 2000)  Discharge to home or other facility with appropriate resources:   Identify barriers to discharge with patient and caregiver   Arrange for needed discharge resources and transportation as appropriate   Identify discharge learning needs (meds, wound care, etc)     Problem: Pain  Goal: Verbalizes/displays adequate comfort level or baseline comfort level  Outcome: Adequate for Discharge

## 2023-03-04 NOTE — DISCHARGE SUMMARY
[address]  [place] and [address] Electrophysiology  Discharge Summary  Patient: Lia Carias  Medical Record Number: [id number]  Date of Procedure:  3/4/2023  Operating Electrophysiologist: Cleopatra Ji MD  Primary Electrophysiologist: Dr Vida Jeff  Referring Physician: Samira Saldaña MD     Admission Date:3/3/2023      Discharge Date:  3/4/23    Patient Active Problem List   Diagnosis    Embolic stroke involving right middle cerebral artery ([address]    Persistent atrial fibrillation ([address]    Hypothyroidism    Cardiomyopathy ([address]    Arthritis    Chronic combined systolic and diastolic heart failure ([address]    First degree atrioventricular block    Hyperlipidemia    Insomnia    Neuralgia    Nonsenile cataract    Nuclear senile cataract    Presbyopia    Seborrheic dermatitis    Subjective visual disturbance    Tear film insufficiency    S/P placement of cardiac pacemaker    Sinus node dysfunction ([address]          Medication List        CHANGE how you take these medications      * doxycycline hyclate 100 MG tablet  Commonly known as: VIBRA-TABS  Take 1 tablet by mouth 2 times daily for 7 days  What changed: Another medication with the same name was added. Make sure you understand how and when to take each. * doxycycline hyclate 100 MG tablet  Commonly known as: VIBRA-TABS  Take 1 tablet by mouth 2 times daily for 7 days  What changed: You were already taking a medication with the same name, and this prescription was added. Make sure you understand how and when to take each. * metoprolol succinate 25 MG extended release tablet  Commonly known as: TOPROL XL  Take 1 tablet by mouth 2 times daily  What changed: Another medication with the same name was added. Make sure you understand how and when to take each. * metoprolol succinate 25 MG extended release tablet  Commonly known as: TOPROL XL  Take 1 tablet by mouth in the morning and at bedtime  What changed:  You were already taking a medication with the same name, and this prescription was added. Make sure you understand how and when to take each. * This list has 4 medication(s) that are the same as other medications prescribed for you. Read the directions carefully, and ask your doctor or other care provider to review them with you. CONTINUE taking these medications      apixaban 5 MG Tabs tablet  Commonly known as: Eliquis  Take 1 tablet by mouth 2 times daily     levothyroxine 100 MCG tablet  Commonly known as: SYNTHROID     lisinopril 5 MG tablet  Commonly known as: PRINIVIL;ZESTRIL  take 1 tablet by mouth once daily     pravastatin 20 MG tablet  Commonly known as: PRAVACHOL     therapeutic multivitamin-minerals tablet     triamcinolone 0.1 % cream  Commonly known as: KENALOG            STOP taking these medications      amiodarone 200 MG tablet  Commonly known as: CORDARONE               Where to Get Your Medications        These medications were sent [facility] [address] [place] [address] [facility] [address] [address]      Phone: [phone]   doxycycline hyclate 100 MG tablet  doxycycline hyclate 100 MG tablet  metoprolol succinate 25 MG extended release tablet         Hospital Course: 69-year-old patient with a history of persistent atrial fibrillation and nonischemic cardiomyopathy who suffered from a right MCA stroke in April 2022 when she was first found to have atrial fibrillation. Although her EF initially was found to be 35 to 40% subsequent stress testing revealed LVEF of 51% in June 2022. 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 Cardiac MRI confirmed the presence of moderate mitral regurgitation. She was placed on amiodarone therapy and underwent repeat cardioversion in November 2022. However thereafter she became markedly bradycardic with symptoms of lightheadedness and event monitoring showed sinus node dysfunction. The patient was brought in for elective pacemaker implantation on 3/3/2023. Her most recent echocardiogram has shown LVEF of 50 to 55% with moderate mitral regurgitation and mild to moderate tricuspid regurgitation on 2/1/2023. The procedure was completed successfully. The patient recovered from the procedure the following day and was discharged home. Procedures Performed: 1. Insertion of Dual Chamber Permanent Pacemaker (Medtronic- MRI conditional)  2. Left upper extremity venography  3. Fluoroscopy       Consultants: None    Disposition: The patient was discharged to home in stable condition on the above medications. Clinical follow-up in the office in 2 weeks for incision check in 6 weeks for device check. She was asked to resume Eliquis on 3/7/2023. Her dose of metoprolol was increased and she was given a prescription for doxycycline for 1 week. Fang Gonzalez MD  Cardiac Electrophysiology  [facility]) Physicians  [facility] [place]: [place] Electrophysiology  1:45 PM  3/4/2023

## 2023-03-06 LAB
EKG ATRIAL RATE: 227 BPM
EKG Q-T INTERVAL: 454 MS
EKG QRS DURATION: 128 MS
EKG QTC CALCULATION (BAZETT): 517 MS
EKG R AXIS: -52 DEGREES
EKG T AXIS: 83 DEGREES
EKG VENTRICULAR RATE: 78 BPM

## 2023-03-07 NOTE — ANESTHESIA POSTPROCEDURE EVALUATION
Department of Anesthesiology  Postprocedure Note    Patient: Alonso Lira  MRN: [id number]  YOB: 1945  Date of evaluation: 3/6/2023      Procedure Summary     Date: 03/03/23 Room / Location: [facility]    Anesthesia Start: 1283 Anesthesia Stop: 6475    Procedure: PACEMAKER IMPLANT W/ANES Diagnosis:       Paroxysmal atrial fibrillation      Chronic systolic (congestive) heart failure      Essential (primary) hypertension      S/P placement of cardiac pacemaker    Scheduled Providers: Rubi Erwin, [address] [place] [address]; Autumn Forbes MD Responsible Provider: Autumn Forbes MD    Anesthesia Type: MAC ASA Status: 3          Anesthesia Type: No value filed.     Home Phase I:      Home Phase II:        Anesthesia Post Evaluation    Patient location during evaluation: PACU  Patient participation: complete - patient participated  Level of consciousness: awake and alert  Airway patency: patent  Nausea & Vomiting: no nausea and no vomiting  Complications: no  Cardiovascular status: blood pressure returned to baseline and hemodynamically stable  Respiratory status: acceptable and spontaneous ventilation  Hydration status: euvolemic  Multimodal analgesia pain management approach

## 2023-03-10 ENCOUNTER — TELEPHONE (OUTPATIENT)
Dept: CARDIAC CATH/INVASIVE PROCEDURES | Age: 78
End: 2023-03-10

## 2023-03-10 NOTE — TELEPHONE ENCOUNTER
I called Sai Baugh to see how she's doing since her PPM insertion on 3/3/23. She states she's doing fine & that the aquacel dressing was removed today. She denies any fevers, redness, bleeding, drainage, or swelling from PPM incision site. I reminded her not to touch the steri-strips, & to continue to wear her sling at night for 4 weeks (she says she is not wearing it and she does not put her arms above her head); along with not abducting the L arm above the shoulder. She's also aware of her follow up appt at the [address] [place] [address] Drive on 3/17/23 at 1:00 pm.  She says she notices more weakness in her legs since the increase in metoprolol (twice daily now), I advised her to discuss that with Dr. Austin Chen and call in if it gets worse she is thankful for the phone call.

## 2023-03-20 ENCOUNTER — NURSE ONLY (OUTPATIENT)
Dept: NON INVASIVE DIAGNOSTICS | Age: 78
End: 2023-03-20
Payer: MEDICARE

## 2023-03-20 DIAGNOSIS — Z95.0 CARDIAC PACEMAKER IN SITU: ICD-10-CM

## 2023-03-20 DIAGNOSIS — I48.19 PERSISTENT ATRIAL FIBRILLATION (HCC): ICD-10-CM

## 2023-03-20 DIAGNOSIS — I49.5 SINUS NODE DYSFUNCTION (HCC): Primary | ICD-10-CM

## 2023-03-20 PROCEDURE — 93280 PM DEVICE PROGR EVAL DUAL: CPT | Performed by: INTERNAL MEDICINE

## 2023-03-20 NOTE — PATIENT INSTRUCTIONS
Continue arm restrictions until 4/1/23  You may shower starting now    Call if any signs or symptoms of infection [phone] ext: [phone]  Fevers, chills, redness, swelling or drainage.      Cardioversion as scheduled 4/19/23    Hook up home  Monitor  [employer] Stay connected: [phone]

## 2023-04-19 ENCOUNTER — ANESTHESIA (OUTPATIENT)
Dept: CARDIAC CATH/INVASIVE PROCEDURES | Age: 78
End: 2023-04-19

## 2023-04-19 ENCOUNTER — HOSPITAL ENCOUNTER (OUTPATIENT)
Dept: CARDIAC CATH/INVASIVE PROCEDURES | Age: 78
Discharge: HOME | End: 2023-04-19
Payer: MEDICARE

## 2023-04-19 ENCOUNTER — ANESTHESIA EVENT (OUTPATIENT)
Dept: CARDIAC CATH/INVASIVE PROCEDURES | Age: 78
End: 2023-04-19

## 2023-04-19 VITALS — SYSTOLIC BLOOD PRESSURE: 135 MMHG | HEART RATE: 61 BPM | DIASTOLIC BLOOD PRESSURE: 79 MMHG | TEMPERATURE: 97.6 F

## 2023-04-19 LAB
ANION GAP SERPL CALCULATED.3IONS-SCNC: 9 MMOL/L (ref 7–16)
BASOPHILS # BLD: 0.09 E9/L (ref 0–0.2)
BASOPHILS NFR BLD: 1.3 % (ref 0–2)
BUN SERPL-MCNC: 15 MG/DL (ref 6–23)
CALCIUM SERPL-MCNC: 9.4 MG/DL (ref 8.6–10.2)
CHLORIDE SERPL-SCNC: 104 MMOL/L (ref 98–107)
CO2 SERPL-SCNC: 29 MMOL/L (ref 22–29)
CREAT SERPL-MCNC: 1.1 MG/DL (ref 0.5–1)
EKG ATRIAL RATE: 71 BPM
EKG Q-T INTERVAL: 434 MS
EKG QRS DURATION: 128 MS
EKG QTC CALCULATION (BAZETT): 461 MS
EKG R AXIS: -47 DEGREES
EKG T AXIS: -152 DEGREES
EKG VENTRICULAR RATE: 68 BPM
EOSINOPHIL # BLD: 0.17 E9/L (ref 0.05–0.5)
EOSINOPHIL NFR BLD: 2.5 % (ref 0–6)
ERYTHROCYTE [DISTWIDTH] IN BLOOD BY AUTOMATED COUNT: 12.3 FL (ref 11.5–15)
GLUCOSE SERPL-MCNC: 97 MG/DL (ref 74–99)
HCT VFR BLD AUTO: 44.8 % (ref 34–48)
HGB BLD-MCNC: 14.5 G/DL (ref 11.5–15.5)
IMM GRANULOCYTES # BLD: 0.02 E9/L
IMM GRANULOCYTES NFR BLD: 0.3 % (ref 0–5)
LYMPHOCYTES # BLD: 1.39 E9/L (ref 1.5–4)
LYMPHOCYTES NFR BLD: 20.3 % (ref 20–42)
MCH RBC QN AUTO: 32.8 PG (ref 26–35)
MCHC RBC AUTO-ENTMCNC: 32.4 % (ref 32–34.5)
MCV RBC AUTO: 101.4 FL (ref 80–99.9)
MONOCYTES # BLD: 0.5 E9/L (ref 0.1–0.95)
MONOCYTES NFR BLD: 7.3 % (ref 2–12)
NEUTROPHILS # BLD: 4.69 E9/L (ref 1.8–7.3)
NEUTS SEG NFR BLD: 68.3 % (ref 43–80)
PLATELET # BLD AUTO: 244 E9/L (ref 130–450)
PMV BLD AUTO: 10.4 FL (ref 7–12)
POTASSIUM SERPL-SCNC: 4.6 MMOL/L (ref 3.5–5)
RBC # BLD AUTO: 4.42 E12/L (ref 3.5–5.5)
SODIUM SERPL-SCNC: 142 MMOL/L (ref 132–146)
WBC # BLD: 6.9 E9/L (ref 4.5–11.5)

## 2023-04-19 PROCEDURE — 93288 INTERROG EVL PM/LDLS PM IP: CPT | Performed by: INTERNAL MEDICINE

## 2023-04-19 PROCEDURE — 2709999900 HC NON-CHARGEABLE SUPPLY

## 2023-04-19 PROCEDURE — 80048 BASIC METABOLIC PNL TOTAL CA: CPT

## 2023-04-19 PROCEDURE — 3700000000 HC ANESTHESIA ATTENDED CARE

## 2023-04-19 PROCEDURE — 2580000003 HC RX 258: Performed by: NURSE ANESTHETIST, CERTIFIED REGISTERED

## 2023-04-19 PROCEDURE — 6360000002 HC RX W HCPCS: Performed by: NURSE ANESTHETIST, CERTIFIED REGISTERED

## 2023-04-19 PROCEDURE — 92960 CARDIOVERSION ELECTRIC EXT: CPT | Performed by: INTERNAL MEDICINE

## 2023-04-19 PROCEDURE — 36415 COLL VENOUS BLD VENIPUNCTURE: CPT

## 2023-04-19 PROCEDURE — 92960 CARDIOVERSION ELECTRIC EXT: CPT

## 2023-04-19 PROCEDURE — 93005 ELECTROCARDIOGRAM TRACING: CPT | Performed by: INTERNAL MEDICINE

## 2023-04-19 PROCEDURE — 85025 COMPLETE CBC W/AUTO DIFF WBC: CPT

## 2023-04-19 PROCEDURE — 99215 OFFICE O/P EST HI 40 MIN: CPT | Performed by: INTERNAL MEDICINE

## 2023-04-19 RX ORDER — SODIUM CHLORIDE 0.9 % (FLUSH) 0.9 %
5-40 SYRINGE (ML) INJECTION EVERY 12 HOURS SCHEDULED
Status: CANCELLED | OUTPATIENT
Start: 2023-04-19

## 2023-04-19 RX ORDER — PROPOFOL 10 MG/ML
INJECTION, EMULSION INTRAVENOUS PRN
Status: DISCONTINUED | OUTPATIENT
Start: 2023-04-19 | End: 2023-04-19 | Stop reason: SDUPTHER

## 2023-04-19 RX ORDER — SODIUM CHLORIDE 9 MG/ML
INJECTION, SOLUTION INTRAVENOUS CONTINUOUS PRN
Status: DISCONTINUED | OUTPATIENT
Start: 2023-04-19 | End: 2023-04-19 | Stop reason: SDUPTHER

## 2023-04-19 RX ORDER — FLECAINIDE ACETATE 100 MG/1
100 TABLET ORAL 2 TIMES DAILY
Qty: 60 TABLET | Refills: 3 | Status: SHIPPED | OUTPATIENT
Start: 2023-04-19

## 2023-04-19 RX ORDER — SODIUM CHLORIDE 0.9 % (FLUSH) 0.9 %
5-40 SYRINGE (ML) INJECTION PRN
Status: CANCELLED | OUTPATIENT
Start: 2023-04-19

## 2023-04-19 RX ORDER — SODIUM CHLORIDE 9 MG/ML
INJECTION, SOLUTION INTRAVENOUS PRN
Status: CANCELLED | OUTPATIENT
Start: 2023-04-19

## 2023-04-19 RX ADMIN — SODIUM CHLORIDE: 9 INJECTION, SOLUTION INTRAVENOUS at 08:45

## 2023-04-19 RX ADMIN — PROPOFOL 60 MG: 10 INJECTION, EMULSION INTRAVENOUS at 08:52

## 2023-04-19 ASSESSMENT — LIFESTYLE VARIABLES: SMOKING_STATUS: 0

## 2023-04-19 NOTE — H&P
importance of well controlled HTN (goal BP < 130/80), adequate weight control (goal BMI of < 27), physical activity consisting of moderate cardiopulmonary exercise up to a goal of 250 min/wk, daily compliance with CPAP in treating sleep apnea, smoking cessation and limited ETOH intake. 2. Nonischemic cardiomyopathy and chronic heart failure with recovered LV function  - Echocardiogram on 4/26/22 showed LV EF of 35-40%. - Nuclear stress test 6/16/22 which showed low risk result and LV EF of 51%. - Cardiac MRI 8/1/22 showed LV EF of 42%. - Echo 2/3/23 showed LV EF 50-55%  - Compensated and euvolemic.  - NYHA class II-III, ACC/AHA stage C.    3. Sinus node dysfunction   - Asymptomatic   - Limiting titration of BB, Toprol reduced from 25 to 12.5 mg post DCCV 11/2022.  - 14 day cardiac monitor 2/13/23 showed 1022 Pauses occurred, the longest lasting 6.9 secs (9 bpm). Triggered events and reported symptoms were correlated with sinus bradycardia, sinus pauses, junctional rhythm, PACs and PVCs. - Status post pacemaker implantation 3/3/23    4. Pacemaker in situ  - MDT; dual chambe. - DOI: 3/3/23.  - Indication: SND and persistent AF  - Normal device function    5. Valvular heart disease  - Echocardiogram on 4/26/22 showed mild bileaflet mitral valve prolapse with mild MR.   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  - Cardiac MRI 8/1/22 showed moderate MR.  - TTE 2/1/23 showed moderate MR    6. CVA  - Diagnosed with right MCA stroke in April 2022.  - On Eliquis. 7. Hyperlipidemia  - On Pravachol. 8. Hypothyroidism  - On Synthroid. Recommendations: Will proceed with DC-cardioversion. Will start Flecainide post procedure. Follow up after the procedure. Encouraged the patient to call the office for any questions or concerns. Thank you for allowing me to participate in your patient's care.   Please call me if there are any questions or

## 2023-04-19 NOTE — ANESTHESIA PRE PROCEDURE
Signs (Current):   Vitals:    04/19/23 0754   BP: 135/79   Pulse: 61   Temp: 36.4 °C (97.6 °F)   TempSrc: Oral                                              BP Readings from Last 3 Encounters:   04/19/23 135/79   03/04/23 (!) 119/59   01/23/23 112/64       NPO Status:  >8 HRS                                                                               BMI:   Wt Readings from Last 3 Encounters:   03/03/23 124 lb (56.2 kg)   01/23/23 128 lb 6.4 oz (58.2 kg)   11/18/22 120 lb (54.4 kg)     There is no height or weight on file to calculate BMI.    CBC:   Lab Results   Component Value Date/Time    WBC 6.1 03/04/2023 04:56 AM    RBC 3.69 03/04/2023 04:56 AM    HGB 11.9 03/04/2023 04:56 AM    HCT 35.3 03/04/2023 04:56 AM    MCV 95.7 03/04/2023 04:56 AM    RDW 13.6 03/04/2023 04:56 AM     03/04/2023 04:56 AM       CMP:   Lab Results   Component Value Date/Time     03/04/2023 04:56 AM    K 4.8 03/04/2023 04:56 AM    K 4.4 04/25/2022 10:10 AM     03/04/2023 04:56 AM    CO2 25 03/04/2023 04:56 AM    BUN 14 03/04/2023 04:56 AM    CREATININE 0.9 03/04/2023 04:56 AM    GFRAA >60 04/26/2022 04:35 AM    LABGLOM >60 03/04/2023 04:56 AM    GLUCOSE 94 03/04/2023 04:56 AM    PROT 7.5 02/24/2023 11:14 AM    CALCIUM 8.8 03/04/2023 04:56 AM    BILITOT 0.6 02/24/2023 11:14 AM    ALKPHOS 93 02/24/2023 11:14 AM    AST 30 02/24/2023 11:14 AM    ALT 25 02/24/2023 11:14 AM       POC Tests: No results for input(s): POCGLU, POCNA, POCK, POCCL, POCBUN, POCHEMO, POCHCT in the last 72 hours.     Coags: No results found for: PROTIME, INR, APTT    HCG (If Applicable): No results found for: PREGTESTUR, PREGSERUM, HCG, HCGQUANT     ABGs: No results found for: PHART, PO2ART, IAL9EHT, HKS7TQE, BEART, H1UHVSKR     Type & Screen (If Applicable):  No results found for: LABABO, LABRH    Drug/Infectious Status (If Applicable):  No results found for: HIV, HEPCAB    COVID-19 Screening (If Applicable):   Lab Results   Component Value Date/Time

## 2023-04-19 NOTE — ANESTHESIA POSTPROCEDURE EVALUATION
Department of Anesthesiology  Postprocedure Note    Patient: Ludwig Berry  MRN: [id number]  YOB: 1945  Date of evaluation: 4/19/2023      Procedure Summary     Date: 04/19/23 Room / Location: [facility] [place] CATH LAB    Anesthesia Start: 0845 Anesthesia Stop: 7080    Procedure: CARDIOVERSION WITH ANESTHESIA Diagnosis: Unspecified atrial fibrillation    Scheduled Providers:  Responsible Provider: Tea Johnson DO    Anesthesia Type: MAC ASA Status: 3          Anesthesia Type: No value filed. Home Phase I:      Home Phase II:        Anesthesia Post Evaluation    Patient location during evaluation: bedside  Patient participation: complete - patient cannot participate  Level of consciousness: awake and alert  Airway patency: patent  Nausea & Vomiting: no nausea and no vomiting  Complications: no  Cardiovascular status: blood pressure returned to baseline  Respiratory status: acceptable  Hydration status: euvolemic  There was medical reason for not using a multimodal analgesia pain management approach.

## 2023-04-19 NOTE — OP NOTE
[address] Electrophysiology  Procedure Report  PATIENT: Josh Beyer  MEDICAL RECORD NUMBER: [id number]  DATE OF PROCEDURE:  4/19/2023  ATTENDING ELECTROPHYSIOLOGIST:  Vaishali Arrieta MD  REFERRING PHYSICIAN: Dr. Angelica Pugh:    1. Direct Current Electrical Cardioversion  2. Ashley procedure device interrogation     INDICATION: Persistent atrial fibrillation    PROCEDURE PERFORMED By: Vaishali Arrieta MD    PROCEDURE TIME: 30 minutes    COMPICATIONS: None immediately apparent    ANESTHESIA: LMAC    DESCRIPTION OF PROCEDURE: The risks, benefits, and alternatives to the procedure were discussed with the patient, and informed consent was obtained. The patient was brought to the cardiovascular lab and sedated under the guidance of anesthesia. Once anesthesia was deemed adequate, a 200 J biphasic synchronous shock was applied and successfully restored normal sinus rhythm. The patient was then allowed to wake in the usual fashion. Device Interrogation:   Underlying rhythm: AF with occasional   Mode: DDDR   Battery Voltage/Longevity:  13 years    Pacing: A: 1.4%  RV: 70.6%   P wave: 1.1 mV  Impedance: 399 ohms   Threshold: 1.0 V @ 0.4 ms  RV R wave: 9.0 mV  Impedance: 380 ohms   Threshold: 0.5 V @ 0.4 ms  Episodes: 100% AF burden  Reprogramming included: none  Overall device function is normal    All device programmable settings were evaluated per above and in the scanned document, along with iterative adjustments (capture thresholds) to assess and select the most appropriate final programming to provide for consistent delivery of the appropriate therapy and to verify function of the device. Comment: The patient was therapeutically anticoagulated for a minimum of 3 consecutive weeks prior to cardioversion. SUMMARY:  1. Successful cardioversion of persistent atrial fibrillation to sinus rhythm.   2. Normal pacemaker

## 2023-04-19 NOTE — DISCHARGE INSTRUCTIONS
[facility]/Elect[facility]  Post DC-Cardioversion Patient Discharge Instructions    1. No driving the day of the procedure. 2. Please resume your medications as instructed. 3. Please call the office at [phone] to schedule a follow-up appointment in 1 week for EKG and in 1 month with provider.

## 2023-04-20 ENCOUNTER — TELEPHONE (OUTPATIENT)
Dept: NON INVASIVE DIAGNOSTICS | Age: 78
End: 2023-04-20

## 2023-04-20 LAB
EKG ATRIAL RATE: 62 BPM
EKG P AXIS: 1 DEGREES
EKG P-R INTERVAL: 366 MS
EKG Q-T INTERVAL: 474 MS
EKG QRS DURATION: 124 MS
EKG QTC CALCULATION (BAZETT): 481 MS
EKG R AXIS: -48 DEGREES
EKG T AXIS: -166 DEGREES
EKG VENTRICULAR RATE: 62 BPM

## 2023-04-20 NOTE — TELEPHONE ENCOUNTER
Scheduled appointments  1 week EKG 04/26/2023 @ 2:20 PM     4-6 wk f/u 05/22/2023 @ 10:00 AM with Jonnathan Willis

## 2023-04-20 NOTE — TELEPHONE ENCOUNTER
----- Message from Joanna Terrazas sent at 4/19/2023 11:33 AM EDT -----    ----- Message -----  From: Kelly Flores MD  Sent: 4/19/2023   9:15 AM EDT  To: Eli Florence MA    EKG and pacer check in 1 week and follow up with ANP in 1 month.  Thanks

## 2023-04-26 ENCOUNTER — NURSE ONLY (OUTPATIENT)
Dept: NON INVASIVE DIAGNOSTICS | Age: 78
End: 2023-04-26
Payer: MEDICARE

## 2023-04-26 DIAGNOSIS — I48.19 PERSISTENT ATRIAL FIBRILLATION (HCC): Primary | ICD-10-CM

## 2023-04-26 PROCEDURE — 93000 ELECTROCARDIOGRAM COMPLETE: CPT | Performed by: INTERNAL MEDICINE

## 2023-04-26 NOTE — PROGRESS NOTES
Pt notified of results and verbalized understanding.     Electronically signed by Rody Denny MA on 4/26/2023 at 3:51 PM

## 2023-04-26 NOTE — PROGRESS NOTES
Patient was seen in the Office today to have EKG per Dr. Sarah Rivas s/p CV. Pt tolerated EKG. Pt complains of being tired and chest tightness on exertion. Patient stated since starting flecainide her fatigue has gotten worse and when she looks to the left or the right the object she is looking at becomes distorted. She did see an eye doctor. The eye doctor stated there is no anatomical reason for her vision to be that way. Please advise.        Electronically signed by Tigre Dorsey MA on 4/26/2023 at 2:30 PM

## 2023-05-11 RX ORDER — APIXABAN 5 MG/1
TABLET, FILM COATED ORAL
Qty: 60 TABLET | Refills: 5 | Status: SHIPPED | OUTPATIENT
Start: 2023-05-11

## 2023-05-22 ENCOUNTER — OFFICE VISIT (OUTPATIENT)
Dept: NON INVASIVE DIAGNOSTICS | Age: 78
End: 2023-05-22
Payer: MEDICARE

## 2023-05-22 VITALS
DIASTOLIC BLOOD PRESSURE: 80 MMHG | SYSTOLIC BLOOD PRESSURE: 122 MMHG | RESPIRATION RATE: 18 BRPM | HEART RATE: 63 BPM | HEIGHT: 68 IN | WEIGHT: 128 LBS | BODY MASS INDEX: 19.4 KG/M2

## 2023-05-22 DIAGNOSIS — I48.19 PERSISTENT ATRIAL FIBRILLATION (HCC): Primary | ICD-10-CM

## 2023-05-22 DIAGNOSIS — Z79.01 ANTICOAGULATED BY ANTICOAGULATION TREATMENT: ICD-10-CM

## 2023-05-22 DIAGNOSIS — Z51.81 ENCOUNTER FOR MONITORING FLECAINIDE THERAPY: ICD-10-CM

## 2023-05-22 DIAGNOSIS — Z95.0 CARDIAC PACEMAKER IN SITU: ICD-10-CM

## 2023-05-22 DIAGNOSIS — I49.5 SINUS NODE DYSFUNCTION (HCC): ICD-10-CM

## 2023-05-22 DIAGNOSIS — Z79.899 ENCOUNTER FOR MONITORING FLECAINIDE THERAPY: ICD-10-CM

## 2023-05-22 PROCEDURE — 1123F ACP DISCUSS/DSCN MKR DOCD: CPT | Performed by: NURSE PRACTITIONER

## 2023-05-22 PROCEDURE — 99214 OFFICE O/P EST MOD 30 MIN: CPT | Performed by: NURSE PRACTITIONER

## 2023-05-22 RX ORDER — FLECAINIDE ACETATE 50 MG/1
50 TABLET ORAL 2 TIMES DAILY
Qty: 180 TABLET | Refills: 1 | Status: SHIPPED
Start: 2023-05-22

## 2023-05-22 NOTE — PROGRESS NOTES
[address] [facility] Electrophysiology  Outpatient Progress Note  Raf Son  1945  Date of Service: 5/22/2023  PCP: No primary care provider on file. Electrophysiologist: Dr. Kim Hawk          Subjective: Raf Son is seen for follow-up and management of: Pacemaker and atrial fibrillation    Last seen Dr. Kim Hawk on 4/19/2023 for cardioversion    PMH as noted below significant for persistent atrial fibrillation and nonischemic cardiomyopathy. The patient was seen by Dr. Rosalinda Tovar in April of 2022 when she presented to the hospital with headache and was found to have right MCA stroke and new onset AF of unknown duration. Echocardiogram on 4/26/22 showed LV EF of 35-40%, mild bileaflet mitral valve prolapse with mild MR and severe LAE. She subsequently underwent nuclear stress test 6/16/22 which showed low risk result and LV EF of 51%. She underwent XANDER and DC-CV on 6/28/22. 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 She had successful DC- Cardioversion but developed ERAF. The patient underwent cardiac MRI which showed moderate MR and LV EF of 42%. She was seen by Dr. Kim Hawk on 10/21/2022 and was subsequently placed on Amiodarone and underwent repeat DCCV on 11/18/22 with Dr. Natasha Kelly who due to bradycardia in the 40's reduced her Toprol to 12.5 mg daily. A Follow up EKG on 11/29/22 showed sinus bradycardia. Pacemaker was discussed. Echocardiogram on 2/1/23 showed LV EF 50-55% she underwent pacemaker on 3/3/2023. She then underwent cardioversion on 4/19/2023 and was started on flecainide 100 twice daily. She did not tolerate this high dose and felt palpitations, was decreased to 50 mg twice daily and states she has felt better since that time. .  She has had no further A-fib since that time.       Patient Active Problem List   Diagnosis    Embolic stroke involving right middle cerebral

## 2023-06-20 ENCOUNTER — NURSE ONLY (OUTPATIENT)
Dept: NON INVASIVE DIAGNOSTICS | Age: 78
End: 2023-06-20

## 2023-06-20 DIAGNOSIS — Z95.0 CARDIAC PACEMAKER IN SITU: Primary | ICD-10-CM

## 2023-06-20 DIAGNOSIS — I49.5 SINUS NODE DYSFUNCTION (HCC): ICD-10-CM

## 2023-06-20 NOTE — PATIENT INSTRUCTIONS
Continue with remote Carelink transmissions    Call the office at [phone], ext [phone] with any device questions

## 2023-08-07 NOTE — TELEPHONE ENCOUNTER
Request: Call from pt to refill flecainide    Last visit: 5/22/23 with Rodney MEZA  Next visit: No Scheduled visit, on recall list    Provider: Dr Mullen Smoke: 4/19/23 and 3/4/23        Called in a 30 day with no refill   Please send in 90 day with refills

## 2023-08-16 RX ORDER — FLECAINIDE ACETATE 50 MG/1
50 TABLET ORAL 2 TIMES DAILY
Qty: 180 TABLET | Refills: 1 | Status: SHIPPED | OUTPATIENT
Start: 2023-08-16

## 2023-09-11 PROCEDURE — 93296 REM INTERROG EVL PM/IDS: CPT | Performed by: INTERNAL MEDICINE

## 2023-09-11 PROCEDURE — 93294 REM INTERROG EVL PM/LDLS PM: CPT | Performed by: INTERNAL MEDICINE

## 2023-11-02 RX ORDER — LISINOPRIL 5 MG/1
TABLET ORAL
Qty: 90 TABLET | Refills: 0 | Status: SHIPPED | OUTPATIENT
Start: 2023-11-02

## 2023-11-15 RX ORDER — APIXABAN 5 MG/1
TABLET, FILM COATED ORAL
Qty: 60 TABLET | Refills: 3 | Status: SHIPPED | OUTPATIENT
Start: 2023-11-15

## 2023-12-11 PROCEDURE — 93294 REM INTERROG EVL PM/LDLS PM: CPT | Performed by: INTERNAL MEDICINE

## 2023-12-11 PROCEDURE — 93296 REM INTERROG EVL PM/IDS: CPT | Performed by: INTERNAL MEDICINE

## 2023-12-11 NOTE — PROGRESS NOTES
last 72 hours. Invalid input(s): \"MAGNESIUM\"  Lab Results   Component Value Date/Time    MG 2.0 03/04/2023 04:56 AM     No results for input(s): \"TSH\" in the last 72 hours. No results for input(s): \"INR\" in the last 72 hours. EKG 12/12/23: A paced V sensed, rate: 64bpm, LAFB, odl AMI,  ms, Qtc 475 ms- Please see scan in Cardiology. Echocardiogram: 2/1/2023  Summary   Limited echo to assess LVEF. LV systolic function is low normal.   Ejection fraction is visually estimated at 50-55%. Bileaflet mitral valve prolapse. Moderate mitral regurgitation is present directed posteriorly. Mild to moderate tricuspid regurgitation. Signature      ----------------------------------------------------------------   Electronically signed by Dillard Oppenheim MD    MCOT: 2/13/2023  Predominant underlying rhythm was Sinus Rhythm. 5 Supraventricular  Tachycardia runs occurred, the run with the longest lasting 11  beats. 1022 Pauses occurred, the longest lasting 6.9 secs (9 bpm). Junctional Rhythm was present. Isolated SVEs were rare (<1.0%), SVE  Couplets were rare (<1.0%), and SVE Triplets were rare (<1.0%). Isolated VEs were rare (<1.0%), and no VE Couplets or VE Triplets  were present. Triggered events and reported symptoms were  correlated with sinus bradycardia, sinus pauses, junctional  rhythm, PACs and PVCs.      Device Interrogation: 12/12/23  Underlying rhythm: A paced V sensed, dependent in atrium  Mode: MVP R   Battery Voltage/Longevity: 12.5 years  Pacing: A:99.9%  RV: 0.5%   P wave: Paced mV  Impedance: 513ohms   Threshold: 0.75V @0.4 ms  RV R wave: 12.5 mV  Impedance: 437ohms   Threshold: 0.75 V @0.4 ms  Episodes: none  Overall device function is normal     All device programmable settings were evaluated per above and in the scanned document, along with iterative adjustments (capture thresholds) to assess and select the most appropriate final programming to provide for consistent delivery

## 2023-12-12 ENCOUNTER — OFFICE VISIT (OUTPATIENT)
Dept: NON INVASIVE DIAGNOSTICS | Age: 78
End: 2023-12-12
Payer: MEDICARE

## 2023-12-12 VITALS
BODY MASS INDEX: 19.7 KG/M2 | SYSTOLIC BLOOD PRESSURE: 110 MMHG | WEIGHT: 130 LBS | HEART RATE: 64 BPM | DIASTOLIC BLOOD PRESSURE: 70 MMHG | HEIGHT: 68 IN

## 2023-12-12 DIAGNOSIS — Z95.0 S/P PLACEMENT OF CARDIAC PACEMAKER: ICD-10-CM

## 2023-12-12 DIAGNOSIS — I48.19 PERSISTENT ATRIAL FIBRILLATION (HCC): Primary | ICD-10-CM

## 2023-12-12 PROCEDURE — 1123F ACP DISCUSS/DSCN MKR DOCD: CPT | Performed by: INTERNAL MEDICINE

## 2023-12-12 PROCEDURE — 93280 PM DEVICE PROGR EVAL DUAL: CPT | Performed by: INTERNAL MEDICINE

## 2023-12-12 PROCEDURE — 99215 OFFICE O/P EST HI 40 MIN: CPT | Performed by: INTERNAL MEDICINE

## 2024-01-25 RX ORDER — LISINOPRIL 5 MG/1
TABLET ORAL
Qty: 90 TABLET | Refills: 2 | Status: SHIPPED | OUTPATIENT
Start: 2024-01-25

## 2024-02-12 RX ORDER — METOPROLOL SUCCINATE 25 MG/1
25 TABLET, EXTENDED RELEASE ORAL 2 TIMES DAILY
Qty: 180 TABLET | Refills: 3 | Status: SHIPPED | OUTPATIENT
Start: 2024-02-12

## 2024-03-11 PROCEDURE — 93294 REM INTERROG EVL PM/LDLS PM: CPT | Performed by: INTERNAL MEDICINE

## 2024-03-11 PROCEDURE — 93296 REM INTERROG EVL PM/IDS: CPT | Performed by: INTERNAL MEDICINE

## 2024-03-12 ENCOUNTER — TELEPHONE (OUTPATIENT)
Dept: CARDIOLOGY CLINIC | Age: 79
End: 2024-03-12

## 2024-03-12 RX ORDER — APIXABAN 5 MG/1
TABLET, FILM COATED ORAL
Qty: 60 TABLET | Refills: 11 | Status: SHIPPED
Start: 2024-03-12 | End: 2024-03-12 | Stop reason: ALTCHOICE

## 2024-03-12 NOTE — TELEPHONE ENCOUNTER
Patient notified of Eliquis dosage change per Dr. Fonseca's recommendations due to age and weight <160. Chart reflects medication changes and script e-scribed patient verbalized understanding.

## 2024-03-17 NOTE — PROGRESS NOTES
[facility] Electrophysiology  Outpatient Progress Note  Queta Queen  1945  Date of Service: 3/18/2024  PCP: Destiny Cox MD  Electrophysiologist: Armando Connolly MD        Subjective: Queta Queen is seen for follow-up and management of pacemaker in situ and persistent AF. Since that office visit she reports feeling well and offers no complaints from a device POV. She does report having chest tightness, her last stress was 2022 which was negative. She defers a repeat stress at this time. The device site looks well healed and free from infection or erosion.  She reports occasional chest tightness which subsided for the past 2 weeks. She defers to have stress test done. The patient denies any dyspnea, palpitations, dizziness, syncope, orthopnea or paroxysmal nocturnal dyspnea. The patient continues to be followed remotely. The patient presents today in SR. She continues on Flecainide 50 mg twice a day for AF suppression, Toprol XL 25 mg twice a day for rate control and Eliquis 5mg twice a day for stroke prophylaxis. Pacemaker interrogation today showed 0% AF burden.    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)    Persistent atrial fibrillation (HCC)    Hypothyroidism    Cardiomyopathy (HCC)    Arthritis    Chronic combined systolic and diastolic heart failure (HCC)    First degree atrioventricular block    Hyperlipidemia    Insomnia    Neuralgia    Nonsenile cataract    Nuclear senile cataract    Presbyopia    Seborrheic dermatitis    Subjective visual disturbance    Tear film insufficiency    S/P placement of cardiac pacemaker    Sinus node dysfunction (HCC)       Current Outpatient Medications   Medication Sig Dispense Refill    apixaban (ELIQUIS) 2.5 MG TABS tablet Take 1 tablet by mouth 2 times daily      metoprolol succinate (TOPROL XL) 25 MG extended release tablet

## 2024-03-18 ENCOUNTER — OFFICE VISIT (OUTPATIENT)
Dept: NON INVASIVE DIAGNOSTICS | Age: 79
End: 2024-03-18
Payer: MEDICARE

## 2024-03-18 VITALS
HEART RATE: 61 BPM | WEIGHT: 132 LBS | RESPIRATION RATE: 18 BRPM | SYSTOLIC BLOOD PRESSURE: 124 MMHG | DIASTOLIC BLOOD PRESSURE: 82 MMHG | HEIGHT: 68 IN | BODY MASS INDEX: 20 KG/M2

## 2024-03-18 DIAGNOSIS — I48.19 PERSISTENT ATRIAL FIBRILLATION (HCC): Primary | ICD-10-CM

## 2024-03-18 DIAGNOSIS — Z79.899 ENCOUNTER FOR MONITORING FLECAINIDE THERAPY: ICD-10-CM

## 2024-03-18 DIAGNOSIS — Z95.0 CARDIAC PACEMAKER IN SITU: ICD-10-CM

## 2024-03-18 DIAGNOSIS — Z51.81 ENCOUNTER FOR MONITORING FLECAINIDE THERAPY: ICD-10-CM

## 2024-03-18 PROCEDURE — 93000 ELECTROCARDIOGRAM COMPLETE: CPT | Performed by: INTERNAL MEDICINE

## 2024-03-18 PROCEDURE — 99215 OFFICE O/P EST HI 40 MIN: CPT | Performed by: INTERNAL MEDICINE

## 2024-03-18 PROCEDURE — 1123F ACP DISCUSS/DSCN MKR DOCD: CPT | Performed by: INTERNAL MEDICINE

## 2024-03-19 PROCEDURE — 93280 PM DEVICE PROGR EVAL DUAL: CPT | Performed by: INTERNAL MEDICINE

## 2024-03-27 DIAGNOSIS — Z79.899 ENCOUNTER FOR MONITORING FLECAINIDE THERAPY: ICD-10-CM

## 2024-03-27 DIAGNOSIS — Z51.81 ENCOUNTER FOR MONITORING FLECAINIDE THERAPY: ICD-10-CM

## 2024-03-27 LAB
ALBUMIN SERPL-MCNC: 4.2 G/DL (ref 3.5–5.2)
ALP BLD-CCNC: 93 U/L (ref 35–104)
ALT SERPL-CCNC: 22 U/L (ref 0–32)
ANION GAP SERPL CALCULATED.3IONS-SCNC: 16 MMOL/L (ref 7–16)
AST SERPL-CCNC: 30 U/L (ref 0–31)
BILIRUB SERPL-MCNC: 0.3 MG/DL (ref 0–1.2)
BUN BLDV-MCNC: 23 MG/DL (ref 6–23)
CALCIUM SERPL-MCNC: 9.9 MG/DL (ref 8.6–10.2)
CHLORIDE BLD-SCNC: 103 MMOL/L (ref 98–107)
CO2: 21 MMOL/L (ref 22–29)
CREAT SERPL-MCNC: 1 MG/DL (ref 0.5–1)
GFR SERPL CREATININE-BSD FRML MDRD: 58 ML/MIN/1.73M2
GLUCOSE BLD-MCNC: 107 MG/DL (ref 74–99)
HCT VFR BLD CALC: 42.4 % (ref 34–48)
HEMOGLOBIN: 14.1 G/DL (ref 11.5–15.5)
MCH RBC QN AUTO: 32.6 PG (ref 26–35)
MCHC RBC AUTO-ENTMCNC: 33.3 G/DL (ref 32–34.5)
MCV RBC AUTO: 98.1 FL (ref 80–99.9)
PDW BLD-RTO: 13 % (ref 11.5–15)
PLATELET # BLD: 275 K/UL (ref 130–450)
PMV BLD AUTO: 11.9 FL (ref 7–12)
POTASSIUM SERPL-SCNC: 4.6 MMOL/L (ref 3.5–5)
RBC # BLD: 4.32 M/UL (ref 3.5–5.5)
SODIUM BLD-SCNC: 140 MMOL/L (ref 132–146)
TOTAL PROTEIN: 7.3 G/DL (ref 6.4–8.3)
WBC # BLD: 6.3 K/UL (ref 4.5–11.5)

## 2024-04-16 ENCOUNTER — TELEPHONE (OUTPATIENT)
Dept: NON INVASIVE DIAGNOSTICS | Age: 79
End: 2024-04-16

## 2024-04-16 NOTE — TELEPHONE ENCOUNTER
----- Message from Armando Connolly MD sent at 4/16/2024 12:29 PM EDT -----  Tachycardia: AF  1  · Stored EGMs are consistent with or suggestive of Atrial Fibrillation  · AT [place]: 0.7%  · Total number of events: 50  · Normal Device Function  · Implant Indication: SSS, hx AF- on Eliquis (DCCV 4/2023)  · Mode: AAIR<=>DDDR LRL 60 bpm  · Arrhythmias: None since 5/2025 clinic check  · Alerts or events: None  · Battery: OK, 12.17 yrs  · Lead trends are stable and within normal limits  · Presenting rhythm: AfVp @ 75 bpm  · HR Histograms show normal HR distribution  · RVp 11.1%  · AT/AF burden 0.7%  Additional Notes:  CareAlert remote transmission from [employer] network d/t : Cumulative RV Pacing greater than 40% for 7 days.      Please remote in 1 week and schedule DC-CV if remains in AF/AFL. Thanks.

## 2024-04-16 NOTE — TELEPHONE ENCOUNTER
Wireless remote scheduled for 4.23.2024.      Lesa ROBN,RN   [facility] Heart and Vascular [place]   Device Clinic

## 2024-04-24 ENCOUNTER — OFFICE VISIT (OUTPATIENT)
Dept: CARDIOLOGY CLINIC | Age: 79
End: 2024-04-24
Payer: MEDICARE

## 2024-04-24 VITALS
DIASTOLIC BLOOD PRESSURE: 62 MMHG | HEART RATE: 132 BPM | HEIGHT: 68 IN | SYSTOLIC BLOOD PRESSURE: 120 MMHG | RESPIRATION RATE: 16 BRPM | BODY MASS INDEX: 20.37 KG/M2 | WEIGHT: 134.4 LBS

## 2024-04-24 DIAGNOSIS — I42.9 CARDIOMYOPATHY, UNSPECIFIED TYPE (HCC): ICD-10-CM

## 2024-04-24 DIAGNOSIS — Z95.0 NORMALLY FUNCTIONING CARDIAC PACEMAKER PRESENT: ICD-10-CM

## 2024-04-24 DIAGNOSIS — I34.0 NONRHEUMATIC MITRAL VALVE REGURGITATION: ICD-10-CM

## 2024-04-24 DIAGNOSIS — Z79.01 ON APIXABAN THERAPY: ICD-10-CM

## 2024-04-24 DIAGNOSIS — I49.5 SINUS NODE DYSFUNCTION (HCC): ICD-10-CM

## 2024-04-24 DIAGNOSIS — I50.42 CHRONIC COMBINED SYSTOLIC AND DIASTOLIC CONGESTIVE HEART FAILURE (HCC): ICD-10-CM

## 2024-04-24 DIAGNOSIS — E78.5 HYPERLIPIDEMIA, UNSPECIFIED HYPERLIPIDEMIA TYPE: Primary | ICD-10-CM

## 2024-04-24 DIAGNOSIS — I48.0 PAF (PAROXYSMAL ATRIAL FIBRILLATION) (HCC): ICD-10-CM

## 2024-04-24 DIAGNOSIS — R07.9 CHEST PAIN, UNSPECIFIED TYPE: ICD-10-CM

## 2024-04-24 DIAGNOSIS — I34.1 MVP (MITRAL VALVE PROLAPSE): ICD-10-CM

## 2024-04-24 PROCEDURE — 99215 OFFICE O/P EST HI 40 MIN: CPT | Performed by: INTERNAL MEDICINE

## 2024-04-24 PROCEDURE — 1123F ACP DISCUSS/DSCN MKR DOCD: CPT | Performed by: INTERNAL MEDICINE

## 2024-04-24 PROCEDURE — 93000 ELECTROCARDIOGRAM COMPLETE: CPT | Performed by: INTERNAL MEDICINE

## 2024-04-24 NOTE — PROGRESS NOTES
OUTPATIENT CARDIOLOGY FOLLOW-UP    Name: Queta Queen    Age: 79 y.o.    Primary Care Physician: Destiny Cox MD    Date of Service: 4/24/2024    Chief Complaint:   Chief Complaint   Patient presents with    Hyperlipidemia    tightness    Dizziness        Interim History:   Here for follow-up regarding atrial fibrillation and cardiomyopathy.  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  Never had any neurologic deficits.  She was found to be in new onset of atrial fibrillation of unknown duration to which the stroke was attributed.  Echo showed EF of 35 to 40%.      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  Cardioversion was initially successful and she went back into atrial fibrillation after 5 minutes.  Subsequent cardiac MRI confirmed moderate MR and I referred her to EP.  Rhythm control was initially achieved with amiodarone, she underwent pacemaker implantation 3/2023 and was subsequently changed to flecainide required another cardioversion.    I have not seen her since 10/2022.  States intermittently dizzy.  Was noted to be in A-fib last week on remote interrogation with a 1.4% AF burden.  States dizziness is better now.  She does get a substernal chest pressure with exertion.  She remains active goes to the mall walking 3 miles a day.  Walking a little bit less than she was.  Does not get the chest pain all the time.  States recently was able to go to the mall and do all of her walking without any symptoms at all.    Scheduled to have colonoscopy tomorrow and needs a small mass excised from the left wrist.    Review of Systems:   Negative except as scribed above    Past Medical History:  Past Medical History:   Diagnosis Date    Arthritis     Atrial fibrillation (HCC) 04/25/2022

## 2024-04-25 NOTE — TELEPHONE ENCOUNTER
See murj:   Real time NSR.     Plan: Continue to monitor AF burden via carelink.   Office 9/2024.      Lesa MCGARRY,RN   [facility] Heart and Vascular [place]   Device Clinic

## 2024-05-21 ENCOUNTER — TELEPHONE (OUTPATIENT)
Dept: NON INVASIVE DIAGNOSTICS | Age: 79
End: 2024-05-21

## 2024-05-21 NOTE — TELEPHONE ENCOUNTER
Patient notified to increase flecainide to 100 MG BID and EKG is scheduled for one week.     Electronically signed by Rekha Giang MA on 5/21/2024 at 9:21 AM

## 2024-05-21 NOTE — TELEPHONE ENCOUNTER
----- Message from Armando Connolly MD sent at 5/20/2024  4:14 PM EDT -----  Normal Remote: With Events  1  · Normal Device Function  · Implant Indication: SSS, hx AF- on Eliquis (DCCV 4/2023)  · Mode: AAIR<=>DDDR LRL 60 bpm  · Arrhythmias: None  · Events or Alerts: 8  · Battery: OK, 3.25 yrs  · Lead trends are stable and within normal limits  · Presenting rhythm: AFL/ @ 60 bpm  · HR Histograms show normal HR distribution  · RVp 17.2%  · AT/AF burden 1.0%  Additional Notes:  CareLink pacemaker remote transmission to assess AT/AF burden.      Increase Flecainide to 100 mg BID. EKG  and check device in 1 week. Thanks.

## 2024-05-25 ENCOUNTER — APPOINTMENT (OUTPATIENT)
Dept: GENERAL RADIOLOGY | Age: 79
End: 2024-05-25
Payer: MEDICARE

## 2024-05-25 ENCOUNTER — HOSPITAL ENCOUNTER (INPATIENT)
Age: 79
LOS: 4 days | Discharge: HOME | DRG: 287 | End: 2024-05-29
Attending: INTERNAL MEDICINE | Admitting: INTERNAL MEDICINE
Payer: MEDICARE

## 2024-05-25 ENCOUNTER — HOSPITAL ENCOUNTER (EMERGENCY)
Age: 79
Discharge: ANOTHER HOSPITAL | End: 2024-05-25
Attending: EMERGENCY MEDICINE
Payer: MEDICARE

## 2024-05-25 VITALS
OXYGEN SATURATION: 98 % | TEMPERATURE: 97.3 F | HEART RATE: 60 BPM | DIASTOLIC BLOOD PRESSURE: 83 MMHG | WEIGHT: 130 LBS | RESPIRATION RATE: 4 BRPM | BODY MASS INDEX: 19.77 KG/M2 | SYSTOLIC BLOOD PRESSURE: 150 MMHG

## 2024-05-25 DIAGNOSIS — I21.4 NSTEMI (NON-ST ELEVATED MYOCARDIAL INFARCTION) (HCC): ICD-10-CM

## 2024-05-25 DIAGNOSIS — I48.20 CHRONIC ATRIAL FIBRILLATION (HCC): ICD-10-CM

## 2024-05-25 DIAGNOSIS — I48.19 PERSISTENT ATRIAL FIBRILLATION (HCC): Primary | ICD-10-CM

## 2024-05-25 DIAGNOSIS — R00.0 WIDE-COMPLEX TACHYCARDIA: Primary | ICD-10-CM

## 2024-05-25 DIAGNOSIS — R00.0 WIDE-COMPLEX TACHYCARDIA: ICD-10-CM

## 2024-05-25 DIAGNOSIS — Z79.899 HIGH RISK MEDICATION USE: ICD-10-CM

## 2024-05-25 LAB
ALBUMIN SERPL-MCNC: 3.9 G/DL (ref 3.5–5.2)
ALP SERPL-CCNC: 78 U/L (ref 35–104)
ALT SERPL-CCNC: 27 U/L (ref 0–32)
ANION GAP SERPL CALCULATED.3IONS-SCNC: 11 MMOL/L (ref 7–16)
AST SERPL-CCNC: 43 U/L (ref 0–31)
BASOPHILS # BLD: 0.04 K/UL (ref 0–0.2)
BASOPHILS NFR BLD: 1 % (ref 0–2)
BILIRUB SERPL-MCNC: 0.5 MG/DL (ref 0–1.2)
BUN SERPL-MCNC: 27 MG/DL (ref 6–23)
CALCIUM SERPL-MCNC: 8.9 MG/DL (ref 8.6–10.2)
CHLORIDE SERPL-SCNC: 102 MMOL/L (ref 98–107)
CO2 SERPL-SCNC: 26 MMOL/L (ref 22–29)
CREAT SERPL-MCNC: 1.2 MG/DL (ref 0.5–1)
EOSINOPHIL # BLD: 0.07 K/UL (ref 0.05–0.5)
EOSINOPHILS RELATIVE PERCENT: 1 % (ref 0–6)
ERYTHROCYTE [DISTWIDTH] IN BLOOD BY AUTOMATED COUNT: 12.4 % (ref 11.5–15)
GFR, ESTIMATED: 46 ML/MIN/1.73M2
GLUCOSE SERPL-MCNC: 115 MG/DL (ref 74–99)
HCT VFR BLD AUTO: 38 % (ref 34–48)
HGB BLD-MCNC: 12.9 G/DL (ref 11.5–15.5)
IMM GRANULOCYTES # BLD AUTO: 0.03 K/UL (ref 0–0.58)
IMM GRANULOCYTES NFR BLD: 0 % (ref 0–5)
LYMPHOCYTES NFR BLD: 1.51 K/UL (ref 1.5–4)
LYMPHOCYTES RELATIVE PERCENT: 22 % (ref 20–42)
MAGNESIUM SERPL-MCNC: 2.8 MG/DL (ref 1.6–2.6)
MCH RBC QN AUTO: 32.7 PG (ref 26–35)
MCHC RBC AUTO-ENTMCNC: 33.9 G/DL (ref 32–34.5)
MCV RBC AUTO: 96.2 FL (ref 80–99.9)
MONOCYTES NFR BLD: 0.48 K/UL (ref 0.1–0.95)
MONOCYTES NFR BLD: 7 % (ref 2–12)
NEUTROPHILS NFR BLD: 69 % (ref 43–80)
NEUTS SEG NFR BLD: 4.69 K/UL (ref 1.8–7.3)
PLATELET # BLD AUTO: 240 K/UL (ref 130–450)
PMV BLD AUTO: 10 FL (ref 7–12)
POTASSIUM SERPL-SCNC: 4.3 MMOL/L (ref 3.5–5)
POTASSIUM SERPL-SCNC: 6 MMOL/L (ref 3.5–5)
PROT SERPL-MCNC: 6.8 G/DL (ref 6.4–8.3)
RBC # BLD AUTO: 3.95 M/UL (ref 3.5–5.5)
SODIUM SERPL-SCNC: 139 MMOL/L (ref 132–146)
T4 FREE SERPL-MCNC: 1.6 NG/DL (ref 0.9–1.7)
TROPONIN I SERPL HS-MCNC: 9 NG/L (ref 0–9)
TROPONIN I SERPL HS-MCNC: NORMAL NG/L (ref 0–14)
TROPONIN INTERP: NORMAL
TROPONIN T SERPL-MCNC: NORMAL NG/ML
TSH SERPL DL<=0.05 MIU/L-ACNC: 2.48 UIU/ML (ref 0.27–4.2)
WBC OTHER # BLD: 6.8 K/UL (ref 4.5–11.5)

## 2024-05-25 PROCEDURE — 84132 ASSAY OF SERUM POTASSIUM: CPT

## 2024-05-25 PROCEDURE — 6360000002 HC RX W HCPCS

## 2024-05-25 PROCEDURE — 2500000003 HC RX 250 WO HCPCS

## 2024-05-25 PROCEDURE — 84443 ASSAY THYROID STIM HORMONE: CPT

## 2024-05-25 PROCEDURE — 83735 ASSAY OF MAGNESIUM: CPT

## 2024-05-25 PROCEDURE — 36415 COLL VENOUS BLD VENIPUNCTURE: CPT

## 2024-05-25 PROCEDURE — 6370000000 HC RX 637 (ALT 250 FOR IP)

## 2024-05-25 PROCEDURE — 84439 ASSAY OF FREE THYROXINE: CPT

## 2024-05-25 PROCEDURE — 96375 TX/PRO/DX INJ NEW DRUG ADDON: CPT

## 2024-05-25 PROCEDURE — 96376 TX/PRO/DX INJ SAME DRUG ADON: CPT

## 2024-05-25 PROCEDURE — 85025 COMPLETE CBC W/AUTO DIFF WBC: CPT

## 2024-05-25 PROCEDURE — 2500000003 HC RX 250 WO HCPCS: Performed by: EMERGENCY MEDICINE

## 2024-05-25 PROCEDURE — 96361 HYDRATE IV INFUSION ADD-ON: CPT

## 2024-05-25 PROCEDURE — 96368 THER/DIAG CONCURRENT INF: CPT

## 2024-05-25 PROCEDURE — 2580000003 HC RX 258: Performed by: EMERGENCY MEDICINE

## 2024-05-25 PROCEDURE — 2000000000 HC ICU R&B

## 2024-05-25 PROCEDURE — 96367 TX/PROPH/DG ADDL SEQ IV INF: CPT

## 2024-05-25 PROCEDURE — 99285 EMERGENCY DEPT VISIT HI MDM: CPT

## 2024-05-25 PROCEDURE — 93005 ELECTROCARDIOGRAM TRACING: CPT

## 2024-05-25 PROCEDURE — 96366 THER/PROPH/DIAG IV INF ADDON: CPT

## 2024-05-25 PROCEDURE — 80053 COMPREHEN METABOLIC PANEL: CPT

## 2024-05-25 PROCEDURE — 84484 ASSAY OF TROPONIN QUANT: CPT

## 2024-05-25 PROCEDURE — 96365 THER/PROPH/DIAG IV INF INIT: CPT

## 2024-05-25 PROCEDURE — 2580000003 HC RX 258

## 2024-05-25 PROCEDURE — 71045 X-RAY EXAM CHEST 1 VIEW: CPT

## 2024-05-25 RX ORDER — LISINOPRIL 5 MG/1
5 TABLET ORAL DAILY
Status: DISCONTINUED | OUTPATIENT
Start: 2024-05-26 | End: 2024-05-27

## 2024-05-25 RX ORDER — PANTOPRAZOLE SODIUM 40 MG/1
40 TABLET, DELAYED RELEASE ORAL
Status: DISCONTINUED | OUTPATIENT
Start: 2024-05-26 | End: 2024-05-29 | Stop reason: HOSPADM

## 2024-05-25 RX ORDER — SODIUM CHLORIDE 0.9 % (FLUSH) 0.9 %
5-40 SYRINGE (ML) INJECTION EVERY 12 HOURS SCHEDULED
Status: DISCONTINUED | OUTPATIENT
Start: 2024-05-25 | End: 2024-05-29 | Stop reason: HOSPADM

## 2024-05-25 RX ORDER — ONDANSETRON 4 MG/1
4 TABLET, ORALLY DISINTEGRATING ORAL EVERY 8 HOURS PRN
Status: DISCONTINUED | OUTPATIENT
Start: 2024-05-25 | End: 2024-05-29 | Stop reason: HOSPADM

## 2024-05-25 RX ORDER — LIDOCAINE HCL/PF 100 MG/5ML
60 SYRINGE (ML) INJECTION ONCE
Status: COMPLETED | OUTPATIENT
Start: 2024-05-25 | End: 2024-05-25

## 2024-05-25 RX ORDER — LIDOCAINE HYDROCHLORIDE 20 MG/ML
INJECTION, SOLUTION INTRAVENOUS
Status: COMPLETED
Start: 2024-05-25 | End: 2024-05-25

## 2024-05-25 RX ORDER — LEVOTHYROXINE SODIUM 0.1 MG/1
100 TABLET ORAL DAILY
Status: DISCONTINUED | OUTPATIENT
Start: 2024-05-26 | End: 2024-05-29 | Stop reason: HOSPADM

## 2024-05-25 RX ORDER — MAGNESIUM SULFATE IN WATER 40 MG/ML
2000 INJECTION, SOLUTION INTRAVENOUS ONCE
Status: COMPLETED | OUTPATIENT
Start: 2024-05-25 | End: 2024-05-25

## 2024-05-25 RX ORDER — SODIUM CHLORIDE 9 MG/ML
INJECTION, SOLUTION INTRAVENOUS PRN
Status: DISCONTINUED | OUTPATIENT
Start: 2024-05-25 | End: 2024-05-29 | Stop reason: HOSPADM

## 2024-05-25 RX ORDER — POLYETHYLENE GLYCOL 3350 17 G/17G
17 POWDER, FOR SOLUTION ORAL DAILY PRN
Status: DISCONTINUED | OUTPATIENT
Start: 2024-05-25 | End: 2024-05-27

## 2024-05-25 RX ORDER — FLECAINIDE ACETATE 100 MG/1
100 TABLET ORAL 2 TIMES DAILY
Status: DISCONTINUED | OUTPATIENT
Start: 2024-05-25 | End: 2024-05-26

## 2024-05-25 RX ORDER — ACETAMINOPHEN 650 MG/1
650 SUPPOSITORY RECTAL EVERY 6 HOURS PRN
Status: DISCONTINUED | OUTPATIENT
Start: 2024-05-25 | End: 2024-05-29 | Stop reason: HOSPADM

## 2024-05-25 RX ORDER — METOPROLOL SUCCINATE 25 MG/1
25 TABLET, EXTENDED RELEASE ORAL 2 TIMES DAILY
Status: DISCONTINUED | OUTPATIENT
Start: 2024-05-25 | End: 2024-05-29 | Stop reason: HOSPADM

## 2024-05-25 RX ORDER — DOCUSATE SODIUM 100 MG/1
100 CAPSULE, LIQUID FILLED ORAL 2 TIMES DAILY
Status: DISCONTINUED | OUTPATIENT
Start: 2024-05-25 | End: 2024-05-29 | Stop reason: HOSPADM

## 2024-05-25 RX ORDER — 0.9 % SODIUM CHLORIDE 0.9 %
500 INTRAVENOUS SOLUTION INTRAVENOUS ONCE
Status: COMPLETED | OUTPATIENT
Start: 2024-05-25 | End: 2024-05-25

## 2024-05-25 RX ORDER — SODIUM CHLORIDE 0.9 % (FLUSH) 0.9 %
5-40 SYRINGE (ML) INJECTION PRN
Status: DISCONTINUED | OUTPATIENT
Start: 2024-05-25 | End: 2024-05-29 | Stop reason: HOSPADM

## 2024-05-25 RX ORDER — PRAVASTATIN SODIUM 20 MG
20 TABLET ORAL DAILY
Status: DISCONTINUED | OUTPATIENT
Start: 2024-05-25 | End: 2024-05-29 | Stop reason: HOSPADM

## 2024-05-25 RX ORDER — ONDANSETRON 2 MG/ML
4 INJECTION INTRAMUSCULAR; INTRAVENOUS EVERY 6 HOURS PRN
Status: DISCONTINUED | OUTPATIENT
Start: 2024-05-25 | End: 2024-05-29 | Stop reason: HOSPADM

## 2024-05-25 RX ORDER — ACETAMINOPHEN 325 MG/1
650 TABLET ORAL EVERY 6 HOURS PRN
Status: DISCONTINUED | OUTPATIENT
Start: 2024-05-25 | End: 2024-05-28 | Stop reason: SDUPTHER

## 2024-05-25 RX ADMIN — SODIUM BICARBONATE 50 MEQ: 84 INJECTION, SOLUTION INTRAVENOUS at 15:02

## 2024-05-25 RX ADMIN — SODIUM CHLORIDE 500 ML: 9 INJECTION, SOLUTION INTRAVENOUS at 14:51

## 2024-05-25 RX ADMIN — DOCUSATE SODIUM 100 MG: 100 CAPSULE, LIQUID FILLED ORAL at 23:46

## 2024-05-25 RX ADMIN — METOPROLOL SUCCINATE 25 MG: 25 TABLET, EXTENDED RELEASE ORAL at 23:45

## 2024-05-25 RX ADMIN — APIXABAN 2.5 MG: 2.5 TABLET, FILM COATED ORAL at 23:46

## 2024-05-25 RX ADMIN — SODIUM BICARBONATE 50 MEQ: 84 INJECTION, SOLUTION INTRAVENOUS at 15:05

## 2024-05-25 RX ADMIN — ACETAMINOPHEN 650 MG: 325 TABLET ORAL at 23:45

## 2024-05-25 RX ADMIN — MAGNESIUM SULFATE IN WATER 2000 MG: 40 INJECTION, SOLUTION INTRAVENOUS at 14:45

## 2024-05-25 RX ADMIN — PRAVASTATIN SODIUM 20 MG: 20 TABLET ORAL at 23:55

## 2024-05-25 RX ADMIN — SODIUM BICARBONATE 50 MEQ: 84 INJECTION, SOLUTION INTRAVENOUS at 14:50

## 2024-05-25 RX ADMIN — SODIUM BICARBONATE: 84 INJECTION, SOLUTION INTRAVENOUS at 15:21

## 2024-05-25 RX ADMIN — LIDOCAINE HYDROCHLORIDE 60 MG: 20 INJECTION, SOLUTION INTRAVENOUS at 15:00

## 2024-05-25 RX ADMIN — Medication 60 MG: at 15:00

## 2024-05-25 ASSESSMENT — PAIN DESCRIPTION - LOCATION: LOCATION: GENERALIZED

## 2024-05-25 ASSESSMENT — PAIN SCALES - GENERAL: PAINLEVEL_OUTOF10: 6

## 2024-05-25 ASSESSMENT — ENCOUNTER SYMPTOMS
VOICE CHANGE: 0
WHEEZING: 0
ABDOMINAL PAIN: 0
DIARRHEA: 0
NAUSEA: 0
COUGH: 0
VOMITING: 0
SHORTNESS OF BREATH: 0
PHOTOPHOBIA: 0
BACK PAIN: 0
TROUBLE SWALLOWING: 0

## 2024-05-25 ASSESSMENT — PAIN DESCRIPTION - DESCRIPTORS: DESCRIPTORS: ACHING

## 2024-05-25 ASSESSMENT — PAIN - FUNCTIONAL ASSESSMENT: PAIN_FUNCTIONAL_ASSESSMENT: 0-10

## 2024-05-26 PROBLEM — I34.0 NONRHEUMATIC MITRAL VALVE REGURGITATION: Status: ACTIVE | Noted: 2024-05-26

## 2024-05-26 PROBLEM — R00.0 WIDE-COMPLEX TACHYCARDIA: Status: ACTIVE | Noted: 2024-05-26

## 2024-05-26 PROBLEM — I34.1 MITRAL VALVE PROLAPSE: Status: ACTIVE | Noted: 2024-05-26

## 2024-05-26 PROBLEM — T46.2X5A: Status: ACTIVE | Noted: 2024-05-26

## 2024-05-26 PROBLEM — I25.9 CHEST PAIN DUE TO MYOCARDIAL ISCHEMIA: Status: ACTIVE | Noted: 2024-05-26

## 2024-05-26 LAB
ANION GAP SERPL CALCULATED.3IONS-SCNC: 12 MMOL/L (ref 7–16)
ANION GAP SERPL CALCULATED.3IONS-SCNC: 9 MMOL/L (ref 7–16)
B PARAP IS1001 DNA NPH QL NAA+NON-PROBE: NOT DETECTED
B PERT DNA SPEC QL NAA+PROBE: NOT DETECTED
BACTERIA URNS QL MICRO: ABNORMAL
BASOPHILS # BLD: 0.04 K/UL (ref 0–0.2)
BASOPHILS # BLD: 0.04 K/UL (ref 0–0.2)
BASOPHILS NFR BLD: 1 % (ref 0–2)
BASOPHILS NFR BLD: 1 % (ref 0–2)
BILIRUB UR QL STRIP: NEGATIVE
BUN SERPL-MCNC: 23 MG/DL (ref 6–23)
BUN SERPL-MCNC: 23 MG/DL (ref 6–23)
C PNEUM DNA NPH QL NAA+NON-PROBE: NOT DETECTED
CA-I BLD-SCNC: 1.12 MMOL/L (ref 1.15–1.33)
CALCIUM SERPL-MCNC: 8.3 MG/DL (ref 8.6–10.2)
CALCIUM SERPL-MCNC: 8.5 MG/DL (ref 8.6–10.2)
CHLORIDE SERPL-SCNC: 98 MMOL/L (ref 98–107)
CHLORIDE SERPL-SCNC: 99 MMOL/L (ref 98–107)
CHOLEST SERPL-MCNC: 128 MG/DL
CLARITY UR: ABNORMAL
CO2 SERPL-SCNC: 28 MMOL/L (ref 22–29)
CO2 SERPL-SCNC: 31 MMOL/L (ref 22–29)
COLOR UR: YELLOW
CREAT SERPL-MCNC: 1.1 MG/DL (ref 0.5–1)
CREAT SERPL-MCNC: 1.1 MG/DL (ref 0.5–1)
EKG ATRIAL RATE: 122 BPM
EKG Q-T INTERVAL: 510 MS
EKG QRS DURATION: 238 MS
EKG QTC CALCULATION (BAZETT): 674 MS
EKG R AXIS: -50 DEGREES
EKG T AXIS: -9 DEGREES
EKG VENTRICULAR RATE: 105 BPM
EOSINOPHIL # BLD: 0.04 K/UL (ref 0.05–0.5)
EOSINOPHIL # BLD: 0.09 K/UL (ref 0.05–0.5)
EOSINOPHILS RELATIVE PERCENT: 1 % (ref 0–6)
EOSINOPHILS RELATIVE PERCENT: 1 % (ref 0–6)
ERYTHROCYTE [DISTWIDTH] IN BLOOD BY AUTOMATED COUNT: 12.7 % (ref 11.5–15)
ERYTHROCYTE [DISTWIDTH] IN BLOOD BY AUTOMATED COUNT: 12.8 % (ref 11.5–15)
FLUAV RNA NPH QL NAA+NON-PROBE: NOT DETECTED
FLUBV RNA NPH QL NAA+NON-PROBE: NOT DETECTED
GFR, ESTIMATED: 50 ML/MIN/1.73M2
GFR, ESTIMATED: 53 ML/MIN/1.73M2
GLUCOSE SERPL-MCNC: 131 MG/DL (ref 74–99)
GLUCOSE SERPL-MCNC: 91 MG/DL (ref 74–99)
GLUCOSE UR STRIP-MCNC: NEGATIVE MG/DL
HADV DNA NPH QL NAA+NON-PROBE: NOT DETECTED
HCOV 229E RNA NPH QL NAA+NON-PROBE: NOT DETECTED
HCOV HKU1 RNA NPH QL NAA+NON-PROBE: NOT DETECTED
HCOV NL63 RNA NPH QL NAA+NON-PROBE: NOT DETECTED
HCOV OC43 RNA NPH QL NAA+NON-PROBE: NOT DETECTED
HCT VFR BLD AUTO: 35 % (ref 34–48)
HCT VFR BLD AUTO: 35.4 % (ref 34–48)
HDLC SERPL-MCNC: 50 MG/DL
HGB BLD-MCNC: 11.7 G/DL (ref 11.5–15.5)
HGB BLD-MCNC: 11.8 G/DL (ref 11.5–15.5)
HGB UR QL STRIP.AUTO: ABNORMAL
HMPV RNA NPH QL NAA+NON-PROBE: NOT DETECTED
HPIV1 RNA NPH QL NAA+NON-PROBE: NOT DETECTED
HPIV2 RNA NPH QL NAA+NON-PROBE: NOT DETECTED
HPIV3 RNA NPH QL NAA+NON-PROBE: NOT DETECTED
HPIV4 RNA NPH QL NAA+NON-PROBE: NOT DETECTED
IMM GRANULOCYTES # BLD AUTO: <0.03 K/UL (ref 0–0.58)
IMM GRANULOCYTES # BLD AUTO: <0.03 K/UL (ref 0–0.58)
IMM GRANULOCYTES NFR BLD: 0 % (ref 0–5)
IMM GRANULOCYTES NFR BLD: 0 % (ref 0–5)
KETONES UR STRIP-MCNC: NEGATIVE MG/DL
LDLC SERPL CALC-MCNC: 59 MG/DL
LEUKOCYTE ESTERASE UR QL STRIP: ABNORMAL
LYMPHOCYTES NFR BLD: 2.05 K/UL (ref 1.5–4)
LYMPHOCYTES NFR BLD: 2.09 K/UL (ref 1.5–4)
LYMPHOCYTES RELATIVE PERCENT: 27 % (ref 20–42)
LYMPHOCYTES RELATIVE PERCENT: 27 % (ref 20–42)
M PNEUMO DNA NPH QL NAA+NON-PROBE: NOT DETECTED
MAGNESIUM SERPL-MCNC: 2.2 MG/DL (ref 1.6–2.6)
MAGNESIUM SERPL-MCNC: 2.4 MG/DL (ref 1.6–2.6)
MAGNESIUM SERPL-MCNC: 2.4 MG/DL (ref 1.6–2.6)
MCH RBC QN AUTO: 32.2 PG (ref 26–35)
MCH RBC QN AUTO: 32.8 PG (ref 26–35)
MCHC RBC AUTO-ENTMCNC: 33.1 G/DL (ref 32–34.5)
MCHC RBC AUTO-ENTMCNC: 33.7 G/DL (ref 32–34.5)
MCV RBC AUTO: 97.2 FL (ref 80–99.9)
MCV RBC AUTO: 97.5 FL (ref 80–99.9)
MONOCYTES NFR BLD: 0.6 K/UL (ref 0.1–0.95)
MONOCYTES NFR BLD: 0.66 K/UL (ref 0.1–0.95)
MONOCYTES NFR BLD: 8 % (ref 2–12)
MONOCYTES NFR BLD: 9 % (ref 2–12)
NEUTROPHILS NFR BLD: 63 % (ref 43–80)
NEUTROPHILS NFR BLD: 64 % (ref 43–80)
NEUTS SEG NFR BLD: 4.84 K/UL (ref 1.8–7.3)
NEUTS SEG NFR BLD: 4.84 K/UL (ref 1.8–7.3)
NITRITE UR QL STRIP: NEGATIVE
PH UR STRIP: 8.5 [PH] (ref 5–9)
PHOSPHATE SERPL-MCNC: 2.9 MG/DL (ref 2.5–4.5)
PHOSPHATE SERPL-MCNC: 3 MG/DL (ref 2.5–4.5)
PHOSPHATE SERPL-MCNC: 3.1 MG/DL (ref 2.5–4.5)
PLATELET # BLD AUTO: 193 K/UL (ref 130–450)
PLATELET # BLD AUTO: 200 K/UL (ref 130–450)
PMV BLD AUTO: 10.5 FL (ref 7–12)
PMV BLD AUTO: 9.9 FL (ref 7–12)
POTASSIUM SERPL-SCNC: 3.8 MMOL/L (ref 3.5–5)
POTASSIUM SERPL-SCNC: 4 MMOL/L (ref 3.5–5)
POTASSIUM SERPL-SCNC: 4.6 MMOL/L (ref 3.5–5)
PROT UR STRIP-MCNC: ABNORMAL MG/DL
RBC # BLD AUTO: 3.6 M/UL (ref 3.5–5.5)
RBC # BLD AUTO: 3.63 M/UL (ref 3.5–5.5)
RBC #/AREA URNS HPF: ABNORMAL /HPF
RSV RNA NPH QL NAA+NON-PROBE: NOT DETECTED
RV+EV RNA NPH QL NAA+NON-PROBE: NOT DETECTED
SARS-COV-2 RNA NPH QL NAA+NON-PROBE: NOT DETECTED
SODIUM SERPL-SCNC: 138 MMOL/L (ref 132–146)
SODIUM SERPL-SCNC: 139 MMOL/L (ref 132–146)
SP GR UR STRIP: 1.01 (ref 1–1.03)
SPECIMEN DESCRIPTION: NORMAL
TRIGL SERPL-MCNC: 96 MG/DL
UROBILINOGEN UR STRIP-ACNC: 0.2 EU/DL (ref 0–1)
VLDLC SERPL CALC-MCNC: 19 MG/DL
WBC #/AREA URNS HPF: ABNORMAL /HPF
WBC OTHER # BLD: 7.6 K/UL (ref 4.5–11.5)
WBC OTHER # BLD: 7.7 K/UL (ref 4.5–11.5)

## 2024-05-26 PROCEDURE — 85025 COMPLETE CBC W/AUTO DIFF WBC: CPT

## 2024-05-26 PROCEDURE — 6370000000 HC RX 637 (ALT 250 FOR IP)

## 2024-05-26 PROCEDURE — 84132 ASSAY OF SERUM POTASSIUM: CPT

## 2024-05-26 PROCEDURE — 0202U NFCT DS 22 TRGT SARS-COV-2: CPT

## 2024-05-26 PROCEDURE — 81001 URINALYSIS AUTO W/SCOPE: CPT

## 2024-05-26 PROCEDURE — 82330 ASSAY OF CALCIUM: CPT

## 2024-05-26 PROCEDURE — 99291 CRITICAL CARE FIRST HOUR: CPT | Performed by: INTERNAL MEDICINE

## 2024-05-26 PROCEDURE — 2000000000 HC ICU R&B

## 2024-05-26 PROCEDURE — 84100 ASSAY OF PHOSPHORUS: CPT

## 2024-05-26 PROCEDURE — 2500000003 HC RX 250 WO HCPCS

## 2024-05-26 PROCEDURE — 80048 BASIC METABOLIC PNL TOTAL CA: CPT

## 2024-05-26 PROCEDURE — 80061 LIPID PANEL: CPT

## 2024-05-26 PROCEDURE — 99223 1ST HOSP IP/OBS HIGH 75: CPT | Performed by: INTERNAL MEDICINE

## 2024-05-26 PROCEDURE — 83735 ASSAY OF MAGNESIUM: CPT

## 2024-05-26 PROCEDURE — 2580000003 HC RX 258

## 2024-05-26 RX ORDER — FLECAINIDE ACETATE 100 MG/1
100 TABLET ORAL 2 TIMES DAILY
Status: CANCELLED | OUTPATIENT
Start: 2024-05-26

## 2024-05-26 RX ORDER — POTASSIUM CHLORIDE 20 MEQ/1
20 TABLET, EXTENDED RELEASE ORAL ONCE
Status: COMPLETED | OUTPATIENT
Start: 2024-05-26 | End: 2024-05-26

## 2024-05-26 RX ORDER — CALCIUM GLUCONATE 10 MG/ML
1000 INJECTION, SOLUTION INTRAVENOUS ONCE
Status: COMPLETED | OUTPATIENT
Start: 2024-05-26 | End: 2024-05-26

## 2024-05-26 RX ADMIN — APIXABAN 2.5 MG: 2.5 TABLET, FILM COATED ORAL at 20:21

## 2024-05-26 RX ADMIN — SODIUM CHLORIDE, PRESERVATIVE FREE 10 ML: 5 INJECTION INTRAVENOUS at 20:24

## 2024-05-26 RX ADMIN — DOCUSATE SODIUM 100 MG: 100 CAPSULE, LIQUID FILLED ORAL at 08:45

## 2024-05-26 RX ADMIN — PRAVASTATIN SODIUM 20 MG: 20 TABLET ORAL at 20:21

## 2024-05-26 RX ADMIN — POTASSIUM CHLORIDE 20 MEQ: 1500 TABLET, EXTENDED RELEASE ORAL at 01:52

## 2024-05-26 RX ADMIN — DOCUSATE SODIUM 100 MG: 100 CAPSULE, LIQUID FILLED ORAL at 20:21

## 2024-05-26 RX ADMIN — PANTOPRAZOLE SODIUM 40 MG: 40 TABLET, DELAYED RELEASE ORAL at 06:19

## 2024-05-26 RX ADMIN — METOPROLOL SUCCINATE 25 MG: 25 TABLET, EXTENDED RELEASE ORAL at 20:21

## 2024-05-26 RX ADMIN — LEVOTHYROXINE SODIUM 100 MCG: 0.1 TABLET ORAL at 06:19

## 2024-05-26 RX ADMIN — CALCIUM GLUCONATE 1000 MG: 10 INJECTION, SOLUTION INTRAVENOUS at 06:20

## 2024-05-26 RX ADMIN — METOPROLOL SUCCINATE 25 MG: 25 TABLET, EXTENDED RELEASE ORAL at 08:45

## 2024-05-26 RX ADMIN — APIXABAN 2.5 MG: 2.5 TABLET, FILM COATED ORAL at 08:46

## 2024-05-26 ASSESSMENT — PAIN SCALES - GENERAL
PAINLEVEL_OUTOF10: 0

## 2024-05-26 ASSESSMENT — PAIN SCALES - WONG BAKER: WONGBAKER_NUMERICALRESPONSE: NO HURT

## 2024-05-26 NOTE — PLAN OF CARE
This resident and Dr. Freind spoke with patient about the code status, As per patient she is \" no to intubation, no to chest compression, no to shock, okay with resuscitated medication\"    Code status changed to LIMITED.    Limited Limited Code details:   Intubation/Re-intubation No;   Defibrillation/Cardioversion No;   Chest Compressions No;   Resuscitative Medications Yes;   Other No Comment     Electronically signed by Destiny Sherman MD on 5/25/2024 at 9:55 PM

## 2024-05-26 NOTE — ED PROVIDER NOTES
and prognosis.  Their questions are answered at this time and they are agreeable with the plan.  Staff have discussed the risks and benefits of transfer and they wish to proceed with the transfer.      --------------------------------- ADDITIONAL PROVIDER NOTES ---------------------------------  Consultations:  Spoke with Dr. Ibarra (Medicine).  Discussed case.  They will admit this patient.  Spoke with Dr. das (Critical care).  Discussed case.  They will admit this patient.    Reason for transfer: EP.    This patient's ED course included: a personal history and physicial examination, re-evaluation prior to disposition, multiple bedside re-evaluations, IV medications, cardiac monitoring, continuous pulse oximetry, and complex medical decision making and emergency management    This patient has been closely monitored during their ED course.    Please note that the withdrawal or failure to initiate urgent interventions for this patient would likely result in a life threatening deterioration or permanent disability.      Accordingly this patient received  45 minutes critical care time, excluding separately billable procedures.    Clinical Impression  1. Wide-complex tachycardia    2. Chronic atrial fibrillation (HCC)          Disposition  Patient's disposition: Transfer to [facility].  Transferred by: ambulance.  Patient's condition is serious.     Attending was present and available throughout encounter including all critical portions; See Attending Note/Attestation for Final Plan  (Please note that portions of this note were completed with a voice recognition program.  Efforts were made to edit the dictations but occasionally words are mis-transcribed.)     ATTENDING PROVIDER ATTESTATION:     IDr. Smith am the primary physician of record for this patient.    Queta Queen presented to the emergency department for evaluation of Atrial Fibrillation (Stated that she has been having issues with her a fib and

## 2024-05-26 NOTE — PLAN OF CARE
Problem: Chronic Conditions and Co-morbidities  Goal: Patient's chronic conditions and co-morbidity symptoms are monitored and maintained or improved  5/26/2024 1126 by Arthur Park RN  Outcome: Progressing     Problem: Discharge Planning  Goal: Discharge to home or other facility with appropriate resources  5/26/2024 1126 by Arthur Park RN  Outcome: Progressing     Problem: Safety - Adult  Goal: Free from fall injury  5/26/2024 1126 by Arthur Park, RN  Outcome: Progressing

## 2024-05-26 NOTE — H&P
no respiratory distress  Cardiovascular: normal rate, normal S1 and S2 and no carotid bruits  Abdomen: soft, non-tender, non-distended, normal bowel sounds, no masses or organomegaly  Extremities: no cyanosis, no clubbing and no edema  Neurologic: no cranial nerve deficit and speech normal        LABS:  Recent Labs     05/25/24  1458 05/25/24  1627 05/26/24  0023     --  138   K 6.0* 4.3 3.8     --  98   CO2 26  --  31*   BUN 27*  --  23   CREATININE 1.2*  --  1.1*   GLUCOSE 115*  --  131*   CALCIUM 8.9  --  8.3*       Recent Labs     05/25/24  1458 05/26/24  0023   WBC 6.8 7.6   RBC 3.95 3.60   HGB 12.9 11.8   HCT 38.0 35.0   MCV 96.2 97.2   MCH 32.7 32.8   MCHC 33.9 33.7   RDW 12.4 12.7    200   MPV 10.0 9.9       No results for input(s): \"POCGLU\" in the last 72 hours.        Radiology:   No orders to display       EKG:     ASSESSMENT:      Active Problems:    * No active hospital problems. *  Resolved Problems:    * No resolved hospital problems. *      PLAN:    1.  Ventricular tachycardia: Electrophysiology consult, telemetry monitoring in ICU.  2.  Atrial fibrillation: On apixaban, continue flecainide.  3.  Hypertension: Continue lisinopril 5 mg p.o. daily monitor blood pressure.  4.  Hypothyroidism: Continue levothyroxine.  5.  Hyperlipidemia: Continue pravastatin 20 mg daily    Code Status: Full  DVT prophylaxis: On apixaban      NOTE: This report was transcribed using voice recognition software. Every effort was made to ensure accuracy; however, inadvertent computerized transcription errors may be present.  Electronically signed by AMY APONTE MD on 5/26/2024 at 3:05 AM

## 2024-05-27 LAB
ALBUMIN SERPL-MCNC: 3.7 G/DL (ref 3.5–5.2)
ALP SERPL-CCNC: 74 U/L (ref 35–104)
ALT SERPL-CCNC: 19 U/L (ref 0–32)
ANION GAP SERPL CALCULATED.3IONS-SCNC: 13 MMOL/L (ref 7–16)
AST SERPL-CCNC: 22 U/L (ref 0–31)
BASOPHILS # BLD: 0.03 K/UL (ref 0–0.2)
BASOPHILS NFR BLD: 0 % (ref 0–2)
BILIRUB DIRECT SERPL-MCNC: <0.2 MG/DL (ref 0–0.3)
BILIRUB INDIRECT SERPL-MCNC: ABNORMAL MG/DL (ref 0–1)
BILIRUB SERPL-MCNC: 0.6 MG/DL (ref 0–1.2)
BUN SERPL-MCNC: 22 MG/DL (ref 6–23)
CALCIUM SERPL-MCNC: 8.6 MG/DL (ref 8.6–10.2)
CHLORIDE SERPL-SCNC: 106 MMOL/L (ref 98–107)
CO2 SERPL-SCNC: 23 MMOL/L (ref 22–29)
CREAT SERPL-MCNC: 1.2 MG/DL (ref 0.5–1)
EOSINOPHIL # BLD: 0.09 K/UL (ref 0.05–0.5)
EOSINOPHILS RELATIVE PERCENT: 1 % (ref 0–6)
ERYTHROCYTE [DISTWIDTH] IN BLOOD BY AUTOMATED COUNT: 12.6 % (ref 11.5–15)
GFR, ESTIMATED: 48 ML/MIN/1.73M2
GLUCOSE SERPL-MCNC: 100 MG/DL (ref 74–99)
HBA1C MFR BLD: 5.2 % (ref 4–5.6)
HCT VFR BLD AUTO: 35 % (ref 34–48)
HGB BLD-MCNC: 11.6 G/DL (ref 11.5–15.5)
IMM GRANULOCYTES # BLD AUTO: 0.03 K/UL (ref 0–0.58)
IMM GRANULOCYTES NFR BLD: 0 % (ref 0–5)
LYMPHOCYTES NFR BLD: 1.65 K/UL (ref 1.5–4)
LYMPHOCYTES RELATIVE PERCENT: 22 % (ref 20–42)
MAGNESIUM SERPL-MCNC: 2 MG/DL (ref 1.6–2.6)
MAGNESIUM SERPL-MCNC: 2 MG/DL (ref 1.6–2.6)
MCH RBC QN AUTO: 32.6 PG (ref 26–35)
MCHC RBC AUTO-ENTMCNC: 33.1 G/DL (ref 32–34.5)
MCV RBC AUTO: 98.3 FL (ref 80–99.9)
MONOCYTES NFR BLD: 0.63 K/UL (ref 0.1–0.95)
MONOCYTES NFR BLD: 9 % (ref 2–12)
NEUTROPHILS NFR BLD: 67 % (ref 43–80)
NEUTS SEG NFR BLD: 4.93 K/UL (ref 1.8–7.3)
PHOSPHATE SERPL-MCNC: 2.7 MG/DL (ref 2.5–4.5)
PHOSPHATE SERPL-MCNC: 3.2 MG/DL (ref 2.5–4.5)
PLATELET # BLD AUTO: 176 K/UL (ref 130–450)
PMV BLD AUTO: 10.5 FL (ref 7–12)
POTASSIUM SERPL-SCNC: 3.8 MMOL/L (ref 3.5–5)
POTASSIUM SERPL-SCNC: 4.1 MMOL/L (ref 3.5–5)
PROT SERPL-MCNC: 6 G/DL (ref 6.4–8.3)
RBC # BLD AUTO: 3.56 M/UL (ref 3.5–5.5)
SODIUM SERPL-SCNC: 142 MMOL/L (ref 132–146)
WBC OTHER # BLD: 7.4 K/UL (ref 4.5–11.5)

## 2024-05-27 PROCEDURE — 6370000000 HC RX 637 (ALT 250 FOR IP)

## 2024-05-27 PROCEDURE — 82248 BILIRUBIN DIRECT: CPT

## 2024-05-27 PROCEDURE — 84100 ASSAY OF PHOSPHORUS: CPT

## 2024-05-27 PROCEDURE — 99231 SBSQ HOSP IP/OBS SF/LOW 25: CPT | Performed by: INTERNAL MEDICINE

## 2024-05-27 PROCEDURE — 80053 COMPREHEN METABOLIC PANEL: CPT

## 2024-05-27 PROCEDURE — APPSS180 APP SPLIT SHARED TIME > 60 MINUTES: Performed by: NURSE PRACTITIONER

## 2024-05-27 PROCEDURE — 6370000000 HC RX 637 (ALT 250 FOR IP): Performed by: INTERNAL MEDICINE

## 2024-05-27 PROCEDURE — 83735 ASSAY OF MAGNESIUM: CPT

## 2024-05-27 PROCEDURE — 85025 COMPLETE CBC W/AUTO DIFF WBC: CPT

## 2024-05-27 PROCEDURE — 2580000003 HC RX 258

## 2024-05-27 PROCEDURE — 84132 ASSAY OF SERUM POTASSIUM: CPT

## 2024-05-27 PROCEDURE — 99291 CRITICAL CARE FIRST HOUR: CPT | Performed by: INTERNAL MEDICINE

## 2024-05-27 PROCEDURE — 83036 HEMOGLOBIN GLYCOSYLATED A1C: CPT

## 2024-05-27 PROCEDURE — 99233 SBSQ HOSP IP/OBS HIGH 50: CPT | Performed by: INTERNAL MEDICINE

## 2024-05-27 PROCEDURE — 2000000000 HC ICU R&B

## 2024-05-27 PROCEDURE — 99223 1ST HOSP IP/OBS HIGH 75: CPT | Performed by: INTERNAL MEDICINE

## 2024-05-27 RX ORDER — SENNOSIDES A AND B 8.6 MG/1
1 TABLET, FILM COATED ORAL NIGHTLY
Status: DISCONTINUED | OUTPATIENT
Start: 2024-05-27 | End: 2024-05-29 | Stop reason: HOSPADM

## 2024-05-27 RX ORDER — POTASSIUM CHLORIDE 20 MEQ/1
20 TABLET, EXTENDED RELEASE ORAL ONCE
Status: COMPLETED | OUTPATIENT
Start: 2024-05-27 | End: 2024-05-28

## 2024-05-27 RX ORDER — AMIODARONE HYDROCHLORIDE 200 MG/1
200 TABLET ORAL DAILY
Status: DISCONTINUED | OUTPATIENT
Start: 2024-05-27 | End: 2024-05-29

## 2024-05-27 RX ORDER — POLYETHYLENE GLYCOL 3350 17 G/17G
17 POWDER, FOR SOLUTION ORAL DAILY
Status: DISCONTINUED | OUTPATIENT
Start: 2024-05-27 | End: 2024-05-29 | Stop reason: HOSPADM

## 2024-05-27 RX ADMIN — LEVOTHYROXINE SODIUM 100 MCG: 0.1 TABLET ORAL at 06:33

## 2024-05-27 RX ADMIN — SENNOSIDES 8.6 MG: 8.6 TABLET, FILM COATED ORAL at 20:24

## 2024-05-27 RX ADMIN — METOPROLOL SUCCINATE 25 MG: 25 TABLET, EXTENDED RELEASE ORAL at 20:23

## 2024-05-27 RX ADMIN — DOCUSATE SODIUM 100 MG: 100 CAPSULE, LIQUID FILLED ORAL at 08:01

## 2024-05-27 RX ADMIN — PRAVASTATIN SODIUM 20 MG: 20 TABLET ORAL at 20:23

## 2024-05-27 RX ADMIN — ACETAMINOPHEN 650 MG: 325 TABLET ORAL at 08:00

## 2024-05-27 RX ADMIN — METOPROLOL SUCCINATE 25 MG: 25 TABLET, EXTENDED RELEASE ORAL at 08:00

## 2024-05-27 RX ADMIN — SODIUM CHLORIDE, PRESERVATIVE FREE 10 ML: 5 INJECTION INTRAVENOUS at 08:05

## 2024-05-27 RX ADMIN — DOCUSATE SODIUM 100 MG: 100 CAPSULE, LIQUID FILLED ORAL at 20:24

## 2024-05-27 RX ADMIN — PANTOPRAZOLE SODIUM 40 MG: 40 TABLET, DELAYED RELEASE ORAL at 06:33

## 2024-05-27 RX ADMIN — AMIODARONE HYDROCHLORIDE 200 MG: 200 TABLET ORAL at 13:55

## 2024-05-27 RX ADMIN — SACUBITRIL AND VALSARTAN 1 TABLET: 24; 26 TABLET, FILM COATED ORAL at 08:00

## 2024-05-27 RX ADMIN — SODIUM CHLORIDE, PRESERVATIVE FREE 10 ML: 5 INJECTION INTRAVENOUS at 08:06

## 2024-05-27 RX ADMIN — SODIUM CHLORIDE, PRESERVATIVE FREE 10 ML: 5 INJECTION INTRAVENOUS at 20:23

## 2024-05-27 ASSESSMENT — PAIN SCALES - GENERAL
PAINLEVEL_OUTOF10: 0
PAINLEVEL_OUTOF10: 3
PAINLEVEL_OUTOF10: 0
PAINLEVEL_OUTOF10: 7

## 2024-05-27 ASSESSMENT — PAIN DESCRIPTION - DESCRIPTORS: DESCRIPTORS: ACHING

## 2024-05-27 ASSESSMENT — PAIN DESCRIPTION - LOCATION: LOCATION: HEAD

## 2024-05-27 NOTE — CONSULTS
Inpatient Cardiology Consultation      Reason for Consult: EP request angiography before adjustment of medication for Atrial Fibrillation.  Patient was on Flecainide and washed out    Consulting Physician: Dr. Rubio     Requesting Physician:  Dr. Foster    Date of Consultation: 5/27/2024    HISTORY OF PRESENT ILLNESS:   Ms. Queen is an 79 year old  female who follows with Dr. Fonseca and Dr. Connolly. She was most recently seen in the office with Dr. Fonseca on 04/24/2024.  Her medical history as stated below.  Ms. Queen presented to [facility] ED on 05/25/2024 with complaints of dizziness, fatigue, and chest pain. She states that prior to presentation, she has been having midsternal chest \"pressure for some time\". She states that since her Flecainide was increased to 100 mg twice daily approximately a week ago she has been having increased fatigue and increased occurrences of midsternal chest pressure unrelated to exertion.  She states that she has also been having dyspnea and several episodes of dizziness and lightheadedness.  She states, \"I really never feel my heart flutter or race until I got in the car to come here, then it was fluttering really hard\".   Upon arrival to the ED her VS were oral temperature 97.3-126/-% RA.  EKG with WCT.  CXR with cardiomegaly.  WBC 6.8.  H&H 12.9/38.  .  K6.  BUN/SCR 27/1.2.  Her case was discussed with Electrophysiology with recommendation to transfer to [facility] and admit to the MICU.  She was transferred and admitted to the MICU on 05/25/2024.  She was evaluated by electrophysiology in consultation with recommendation for angiography before adjustment of medication for atrial fibrillation as she was on flecainide and this has been washed out.  Her lisinopril was also discontinued and she was placed on Entresto.  Eliquis is currently on hold in anticipation for coronary angiography.      Please note: past medical records were reviewed per 
[facility]  Internal Medicine Residency Program  CONSULT NOTE  MICU    Patient:  Queta Queen 79 y.o. female MRN: [id number]     Date of Service: 5/25/2024    Hospital Day: 1      Chief complaint: Lightheadedness and diaphoresis  History of Present Illness   Queta Queen 79 y.o. female with past medical history of Hypothyroidism, Atrial fibrillation, Ischemic stroke, Arthritis, Hypertension, Hyperlipidemia, ischemic cardiomyopathy presented to [facility] with complaints of lightheadedness, and diaphoresis, for few days, worsening symptoms today morning, she decided to went to [facility] Emergency department for further evaluation. She follows up with Cardiologist Dr. Fonseca and Electrophysiologist Dr. Connolly, she takes Eliquis 2.5 mg BID, On GDMT with Toprol XL BID, and Lisinopril, On Flecainide for AF prevetion, recently her dose was increased from 50 to 100 mg twice daily on 05/21. However, she was not feeling well for last few days, today morning she was feeling uncomfortable, as per patient, \"its hard to describe the feeling\", she mentioned, whatever she would do, felt restlessness, tightness in the middle of her chest, she mentioned, usually when she takes rest, this feeling goes away, but today morning she was having  that feeling persistently, despite taking rest. She quit smoking 30 years ago, denies alcohol or any other recreational drug use. She is retired, used to be teacher. At that moment, she was not having any chest pain, dizziness, lightheadedness, shortness of breath, nausea, vomiting or any other symptoms.    On arrival to the ED, she was noted to be in wide-complex tachycardia on initial EKG, on the monitor, with runs of wide complex tachycardia intermittently between atrial flutter/A-fib. Relevant labs were done, BMP showed hyperkalemia which was moderate hemolyzed, repeat was normal with K of 4.3, Elevated BUN  of 27, Cr 1.2, baseline 0.9, hyperglycemia with BG of 115, 
thresholds) to assess and select the most appropriate final programming for consistent delivery off the appropriate therapy and to verify function of the device.    I have independently reviewed all of the ECGs and rhythm strips per above     Assessment/Plan: This is a 79 y.o. female with     Sustained wide QRS tachycardia--symptomatic, patient near syncopal  VT related to structural heart disease and flecainide  Patient responded to IV lidocaine    2.  Chest pain--no prior history of coronary artery disease  Patient complains of ongoing exertional chest pressure for the last 3 weeks.  Occasionally associated with palpitations in the last week    3.  Paroxysmal atrial fibrillation which was persistent in 2022  - Diagnosed with new onset AF of unknown duration in April 2022.   - [id number] of 5  - Underwent XANDER and successful DC-CV on 6/28/22 with ERAF.   - On Eliquis for stroke risk reduction.  Subsequently placed on amiodarone and then underwent pacemaker implant for sinus node dysfunction on amiodarone    4.  Dual-chamber pacemaker in situ--placed March 2023  Appropriate function as tested today     5. Nonischemic cardiomyopathy and chronic HFmrEF  - Echocardiogram on 4/26/22 showed LV EF of 35-40%.  - Nuclear stress test 6/16/22 which showed low risk result and LV EF of 51%.  - Cardiac MRI 8/1/22 showed LV EF of 42%.   - Compensated and euvolemic.  - NYHA class II-III, ACC/AHA stage C.     6. Valvular heart disease  - Echocardiogram on 4/26/22 showed mild bileaflet mitral valve prolapse with mild MR.   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  - Cardiac MRI 8/1/22 showed moderate MR.     7. CVA  - Diagnosed with right MCA stroke in April 2022.  - On Eliquis.     8. Hyperlipidemia  - On Pravachol.     9. Hypothyroidism  - On Synthroid.    Recommendations:    Discontinue flecainide  Coronary evaluation--will discuss angiography with cardiology

## 2024-05-27 NOTE — PLAN OF CARE
Problem: Chronic Conditions and Co-morbidities  Goal: Patient's chronic conditions and co-morbidity symptoms are monitored and maintained or improved  5/26/2024 2116 by Toñito Saenz, RN  Outcome: Progressing     Problem: Discharge Planning  Goal: Discharge to home or other facility with appropriate resources  5/26/2024 2116 by Toñito Saenz, RN  Outcome: Progressing     Problem: Safety - Adult  Goal: Free from fall injury  5/26/2024 2116 by Toñito Saenz, RN  Outcome: Progressing  Flowsheets (Taken 5/26/2024 2116)  Free From Fall Injury:   Instruct family/caregiver on patient safety   Based on caregiver fall risk screen, instruct family/caregiver to ask for assistance with transferring infant if caregiver noted to have fall risk factors

## 2024-05-27 NOTE — PLAN OF CARE
Problem: Chronic Conditions and Co-morbidities  Goal: Patient's chronic conditions and co-morbidity symptoms are monitored and maintained or improved  5/27/2024 0832 by Arthur Park RN  Outcome: Progressing     Problem: Discharge Planning  Goal: Discharge to home or other facility with appropriate resources  5/27/2024 0832 by Arthur Park RN  Outcome: Progressing     Problem: Safety - Adult  Goal: Free from fall injury  5/27/2024 0832 by Arthur Park RN  Outcome: Progressing     Problem: Pain  Goal: Verbalizes/displays adequate comfort level or baseline comfort level  Outcome: Progressing

## 2024-05-28 ENCOUNTER — APPOINTMENT (OUTPATIENT)
Age: 79
DRG: 287 | End: 2024-05-28
Attending: INTERNAL MEDICINE
Payer: MEDICARE

## 2024-05-28 LAB
ABO + RH BLD: NORMAL
ANION GAP SERPL CALCULATED.3IONS-SCNC: 12 MMOL/L (ref 7–16)
ARM BAND NUMBER: NORMAL
BASOPHILS # BLD: 0.06 K/UL (ref 0–0.2)
BASOPHILS NFR BLD: 1 % (ref 0–2)
BLOOD BANK SAMPLE EXPIRATION: NORMAL
BLOOD GROUP ANTIBODIES SERPL: NEGATIVE
BUN SERPL-MCNC: 17 MG/DL (ref 6–23)
CALCIUM SERPL-MCNC: 9.1 MG/DL (ref 8.6–10.2)
CHLORIDE SERPL-SCNC: 105 MMOL/L (ref 98–107)
CO2 SERPL-SCNC: 23 MMOL/L (ref 22–29)
CREAT SERPL-MCNC: 1 MG/DL (ref 0.5–1)
ECHO AO ASC DIAM: 2.6 CM
ECHO AO ASCENDING AORTA INDEX: 1.48 CM/M2
ECHO AV AREA PEAK VELOCITY: 1.8 CM2
ECHO AV AREA VTI: 2 CM2
ECHO AV AREA/BSA PEAK VELOCITY: 1 CM2/M2
ECHO AV AREA/BSA VTI: 1.1 CM2/M2
ECHO AV CUSP MM: 1.7 CM
ECHO AV MEAN GRADIENT: 3 MMHG
ECHO AV MEAN VELOCITY: 0.9 M/S
ECHO AV PEAK GRADIENT: 7 MMHG
ECHO AV PEAK VELOCITY: 1.3 M/S
ECHO AV VELOCITY RATIO: 0.62
ECHO AV VTI: 27.6 CM
ECHO BSA: 1.72 M2
ECHO EST RA PRESSURE: 3 MMHG
ECHO LA DIAMETER INDEX: 2.1 CM/M2
ECHO LA DIAMETER: 3.7 CM
ECHO LA VOL A-L A2C: 97 ML (ref 22–52)
ECHO LA VOL A-L A4C: 142 ML (ref 22–52)
ECHO LA VOL BP: 115 ML (ref 22–52)
ECHO LA VOL MOD A2C: 93 ML (ref 22–52)
ECHO LA VOL MOD A4C: 128 ML (ref 22–52)
ECHO LA VOL/BSA BIPLANE: 65 ML/M2 (ref 16–34)
ECHO LA VOLUME AREA LENGTH: 124 ML
ECHO LA VOLUME INDEX A-L A2C: 55 ML/M2 (ref 16–34)
ECHO LA VOLUME INDEX A-L A4C: 81 ML/M2 (ref 16–34)
ECHO LA VOLUME INDEX AREA LENGTH: 70 ML/M2 (ref 16–34)
ECHO LA VOLUME INDEX MOD A2C: 53 ML/M2 (ref 16–34)
ECHO LA VOLUME INDEX MOD A4C: 73 ML/M2 (ref 16–34)
ECHO LV EDV A2C: 76 ML
ECHO LV EDV A4C: 87 ML
ECHO LV EDV BP: 83 ML (ref 56–104)
ECHO LV EDV INDEX A4C: 49 ML/M2
ECHO LV EDV INDEX BP: 47 ML/M2
ECHO LV EDV NDEX A2C: 43 ML/M2
ECHO LV EF PHYSICIAN: 60 %
ECHO LV EJECTION FRACTION A2C: 39 %
ECHO LV EJECTION FRACTION A4C: 39 %
ECHO LV EJECTION FRACTION BIPLANE: 39 % (ref 55–100)
ECHO LV ESV A2C: 47 ML
ECHO LV ESV A4C: 53 ML
ECHO LV ESV BP: 51 ML (ref 19–49)
ECHO LV ESV INDEX A2C: 27 ML/M2
ECHO LV ESV INDEX A4C: 30 ML/M2
ECHO LV ESV INDEX BP: 29 ML/M2
ECHO LV FRACTIONAL SHORTENING: 19 % (ref 28–44)
ECHO LV INTERNAL DIMENSION DIASTOLE INDEX: 2.44 CM/M2
ECHO LV INTERNAL DIMENSION DIASTOLIC: 4.3 CM (ref 3.9–5.3)
ECHO LV INTERNAL DIMENSION SYSTOLIC INDEX: 1.99 CM/M2
ECHO LV INTERNAL DIMENSION SYSTOLIC: 3.5 CM
ECHO LV IVSD: 0.9 CM (ref 0.6–0.9)
ECHO LV IVSS: 1 CM
ECHO LV MASS 2D: 152.6 G (ref 67–162)
ECHO LV MASS INDEX 2D: 86.7 G/M2 (ref 43–95)
ECHO LV POSTERIOR WALL DIASTOLIC: 1.2 CM (ref 0.6–0.9)
ECHO LV POSTERIOR WALL SYSTOLIC: 1.3 CM
ECHO LV RELATIVE WALL THICKNESS RATIO: 0.56
ECHO LVOT AREA: 3.1 CM2
ECHO LVOT AV VTI INDEX: 0.66
ECHO LVOT DIAM: 2 CM
ECHO LVOT MEAN GRADIENT: 1 MMHG
ECHO LVOT PEAK GRADIENT: 2 MMHG
ECHO LVOT PEAK VELOCITY: 0.8 M/S
ECHO LVOT STROKE VOLUME INDEX: 32.3 ML/M2
ECHO LVOT SV: 56.8 ML
ECHO LVOT VTI: 18.1 CM
ECHO MV "A" WAVE DURATION: 262.6 MSEC
ECHO MV A VELOCITY: 0.89 M/S
ECHO MV AREA PHT: 3.4 CM2
ECHO MV AREA VTI: 1.6 CM2
ECHO MV E DECELERATION TIME (DT): 163.5 MS
ECHO MV E VELOCITY: 1.47 M/S
ECHO MV E/A RATIO: 1.65
ECHO MV EROA PISA: 0.2 CM2
ECHO MV LVOT VTI INDEX: 1.98
ECHO MV MAX VELOCITY: 1.4 M/S
ECHO MV MEAN GRADIENT: 5 MMHG
ECHO MV MEAN VELOCITY: 1.1 M/S
ECHO MV PEAK GRADIENT: 8 MMHG
ECHO MV PRESSURE HALF TIME (PHT): 64.5 MS
ECHO MV REGURGITANT ALIASING (NYQUIST) VELOCITY: 36 CM/S
ECHO MV REGURGITANT RADIUS PISA: 0.69 CM
ECHO MV REGURGITANT VELOCITY PISA: 5.6 M/S
ECHO MV REGURGITANT VOLUME PISA: 36.14 ML
ECHO MV REGURGITANT VTIA: 188 CM
ECHO MV VTI: 35.8 CM
ECHO PV MAX VELOCITY: 0.5 M/S
ECHO PV MEAN GRADIENT: 1 MMHG
ECHO PV MEAN VELOCITY: 0.4 M/S
ECHO PV PEAK GRADIENT: 1 MMHG
ECHO PV VTI: 11.6 CM
ECHO PVEIN PEAK D VELOCITY: 0.6 M/S
ECHO PVEIN PEAK S VELOCITY: 0.4 M/S
ECHO PVEIN S/D RATIO: 0.7
ECHO RIGHT VENTRICULAR SYSTOLIC PRESSURE (RVSP): 48 MMHG
ECHO RV INTERNAL DIMENSION: 3.3 CM
ECHO TV REGURGITANT MAX VELOCITY: 3.35 M/S
ECHO TV REGURGITANT PEAK GRADIENT: 45 MMHG
EOSINOPHIL # BLD: 0.22 K/UL (ref 0.05–0.5)
EOSINOPHILS RELATIVE PERCENT: 3 % (ref 0–6)
ERYTHROCYTE [DISTWIDTH] IN BLOOD BY AUTOMATED COUNT: 12.4 % (ref 11.5–15)
GFR, ESTIMATED: 56 ML/MIN/1.73M2
GLUCOSE SERPL-MCNC: 90 MG/DL (ref 74–99)
HCT VFR BLD AUTO: 37.1 % (ref 34–48)
HGB BLD-MCNC: 12.5 G/DL (ref 11.5–15.5)
IMM GRANULOCYTES # BLD AUTO: <0.03 K/UL (ref 0–0.58)
IMM GRANULOCYTES NFR BLD: 0 % (ref 0–5)
LYMPHOCYTES NFR BLD: 1.58 K/UL (ref 1.5–4)
LYMPHOCYTES RELATIVE PERCENT: 23 % (ref 20–42)
MAGNESIUM SERPL-MCNC: 2 MG/DL (ref 1.6–2.6)
MCH RBC QN AUTO: 32 PG (ref 26–35)
MCHC RBC AUTO-ENTMCNC: 33.7 G/DL (ref 32–34.5)
MCV RBC AUTO: 94.9 FL (ref 80–99.9)
MONOCYTES NFR BLD: 0.66 K/UL (ref 0.1–0.95)
MONOCYTES NFR BLD: 10 % (ref 2–12)
NEUTROPHILS NFR BLD: 63 % (ref 43–80)
NEUTS SEG NFR BLD: 4.4 K/UL (ref 1.8–7.3)
PHOSPHATE SERPL-MCNC: 3 MG/DL (ref 2.5–4.5)
PLATELET # BLD AUTO: 195 K/UL (ref 130–450)
PMV BLD AUTO: 9.7 FL (ref 7–12)
POTASSIUM SERPL-SCNC: 4.1 MMOL/L (ref 3.5–5)
RBC # BLD AUTO: 3.91 M/UL (ref 3.5–5.5)
SODIUM SERPL-SCNC: 140 MMOL/L (ref 132–146)
WBC OTHER # BLD: 6.9 K/UL (ref 4.5–11.5)

## 2024-05-28 PROCEDURE — 80048 BASIC METABOLIC PNL TOTAL CA: CPT

## 2024-05-28 PROCEDURE — 4A023N7 MEASUREMENT OF CARDIAC SAMPLING AND PRESSURE, LEFT HEART, PERCUTANEOUS APPROACH: ICD-10-PCS | Performed by: INTERNAL MEDICINE

## 2024-05-28 PROCEDURE — 6370000000 HC RX 637 (ALT 250 FOR IP)

## 2024-05-28 PROCEDURE — 93454 CORONARY ARTERY ANGIO S&I: CPT | Performed by: INTERNAL MEDICINE

## 2024-05-28 PROCEDURE — 86850 RBC ANTIBODY SCREEN: CPT

## 2024-05-28 PROCEDURE — 2060000000 HC ICU INTERMEDIATE R&B

## 2024-05-28 PROCEDURE — 85025 COMPLETE CBC W/AUTO DIFF WBC: CPT

## 2024-05-28 PROCEDURE — 93306 TTE W/DOPPLER COMPLETE: CPT | Performed by: INTERNAL MEDICINE

## 2024-05-28 PROCEDURE — C8929 TTE W OR WO FOL WCON,DOPPLER: HCPCS

## 2024-05-28 PROCEDURE — 86900 BLOOD TYPING SEROLOGIC ABO: CPT

## 2024-05-28 PROCEDURE — C1894 INTRO/SHEATH, NON-LASER: HCPCS | Performed by: INTERNAL MEDICINE

## 2024-05-28 PROCEDURE — 2709999900 HC NON-CHARGEABLE SUPPLY: Performed by: INTERNAL MEDICINE

## 2024-05-28 PROCEDURE — 2580000003 HC RX 258

## 2024-05-28 PROCEDURE — B2111ZZ FLUOROSCOPY OF MULTIPLE CORONARY ARTERIES USING LOW OSMOLAR CONTRAST: ICD-10-PCS | Performed by: INTERNAL MEDICINE

## 2024-05-28 PROCEDURE — C1769 GUIDE WIRE: HCPCS | Performed by: INTERNAL MEDICINE

## 2024-05-28 PROCEDURE — 99232 SBSQ HOSP IP/OBS MODERATE 35: CPT | Performed by: INTERNAL MEDICINE

## 2024-05-28 PROCEDURE — 6370000000 HC RX 637 (ALT 250 FOR IP): Performed by: INTERNAL MEDICINE

## 2024-05-28 PROCEDURE — 2500000003 HC RX 250 WO HCPCS: Performed by: INTERNAL MEDICINE

## 2024-05-28 PROCEDURE — 86901 BLOOD TYPING SEROLOGIC RH(D): CPT

## 2024-05-28 PROCEDURE — 84100 ASSAY OF PHOSPHORUS: CPT

## 2024-05-28 PROCEDURE — 6360000004 HC RX CONTRAST MEDICATION: Performed by: INTERNAL MEDICINE

## 2024-05-28 PROCEDURE — 99291 CRITICAL CARE FIRST HOUR: CPT | Performed by: INTERNAL MEDICINE

## 2024-05-28 PROCEDURE — 83735 ASSAY OF MAGNESIUM: CPT

## 2024-05-28 PROCEDURE — 6360000002 HC RX W HCPCS: Performed by: INTERNAL MEDICINE

## 2024-05-28 RX ORDER — ASPIRIN 325 MG
325 TABLET ORAL ONCE
Status: COMPLETED | OUTPATIENT
Start: 2024-05-28 | End: 2024-05-28

## 2024-05-28 RX ORDER — HEPARIN SODIUM 10000 [USP'U]/ML
INJECTION, SOLUTION INTRAVENOUS; SUBCUTANEOUS PRN
Status: DISCONTINUED | OUTPATIENT
Start: 2024-05-28 | End: 2024-05-28 | Stop reason: HOSPADM

## 2024-05-28 RX ORDER — SODIUM CHLORIDE 9 MG/ML
INJECTION, SOLUTION INTRAVENOUS PRN
Status: DISCONTINUED | OUTPATIENT
Start: 2024-05-28 | End: 2024-05-29 | Stop reason: HOSPADM

## 2024-05-28 RX ORDER — ACETAMINOPHEN 325 MG/1
650 TABLET ORAL EVERY 4 HOURS PRN
Status: DISCONTINUED | OUTPATIENT
Start: 2024-05-28 | End: 2024-05-29 | Stop reason: HOSPADM

## 2024-05-28 RX ORDER — FENTANYL CITRATE 50 UG/ML
INJECTION, SOLUTION INTRAMUSCULAR; INTRAVENOUS PRN
Status: DISCONTINUED | OUTPATIENT
Start: 2024-05-28 | End: 2024-05-28 | Stop reason: HOSPADM

## 2024-05-28 RX ORDER — SODIUM CHLORIDE 0.9 % (FLUSH) 0.9 %
5-40 SYRINGE (ML) INJECTION PRN
Status: DISCONTINUED | OUTPATIENT
Start: 2024-05-28 | End: 2024-05-29 | Stop reason: HOSPADM

## 2024-05-28 RX ORDER — MIDAZOLAM HYDROCHLORIDE 1 MG/ML
INJECTION INTRAMUSCULAR; INTRAVENOUS PRN
Status: DISCONTINUED | OUTPATIENT
Start: 2024-05-28 | End: 2024-05-28 | Stop reason: HOSPADM

## 2024-05-28 RX ORDER — VERAPAMIL HYDROCHLORIDE 2.5 MG/ML
INJECTION, SOLUTION INTRAVENOUS PRN
Status: DISCONTINUED | OUTPATIENT
Start: 2024-05-28 | End: 2024-05-28 | Stop reason: HOSPADM

## 2024-05-28 RX ORDER — SODIUM CHLORIDE 0.9 % (FLUSH) 0.9 %
5-40 SYRINGE (ML) INJECTION EVERY 12 HOURS SCHEDULED
Status: DISCONTINUED | OUTPATIENT
Start: 2024-05-28 | End: 2024-05-29 | Stop reason: HOSPADM

## 2024-05-28 RX ADMIN — AMIODARONE HYDROCHLORIDE 200 MG: 200 TABLET ORAL at 08:01

## 2024-05-28 RX ADMIN — POTASSIUM CHLORIDE 20 MEQ: 1500 TABLET, EXTENDED RELEASE ORAL at 00:24

## 2024-05-28 RX ADMIN — SODIUM CHLORIDE, PRESERVATIVE FREE 10 ML: 5 INJECTION INTRAVENOUS at 21:39

## 2024-05-28 RX ADMIN — PANTOPRAZOLE SODIUM 40 MG: 40 TABLET, DELAYED RELEASE ORAL at 06:19

## 2024-05-28 RX ADMIN — DOCUSATE SODIUM 100 MG: 100 CAPSULE, LIQUID FILLED ORAL at 21:39

## 2024-05-28 RX ADMIN — LEVOTHYROXINE SODIUM 100 MCG: 0.1 TABLET ORAL at 06:20

## 2024-05-28 RX ADMIN — POLYETHYLENE GLYCOL 3350 17 G: 17 POWDER, FOR SOLUTION ORAL at 08:06

## 2024-05-28 RX ADMIN — SENNOSIDES 8.6 MG: 8.6 TABLET, FILM COATED ORAL at 21:39

## 2024-05-28 RX ADMIN — PRAVASTATIN SODIUM 20 MG: 20 TABLET ORAL at 21:39

## 2024-05-28 RX ADMIN — ASPIRIN 325 MG ORAL TABLET 325 MG: 325 PILL ORAL at 12:16

## 2024-05-28 RX ADMIN — METOPROLOL SUCCINATE 25 MG: 25 TABLET, EXTENDED RELEASE ORAL at 08:01

## 2024-05-28 RX ADMIN — SODIUM CHLORIDE, PRESERVATIVE FREE 10 ML: 5 INJECTION INTRAVENOUS at 08:07

## 2024-05-28 RX ADMIN — DOCUSATE SODIUM 100 MG: 100 CAPSULE, LIQUID FILLED ORAL at 08:01

## 2024-05-28 ASSESSMENT — ENCOUNTER SYMPTOMS
WHEEZING: 0
PHOTOPHOBIA: 0
ABDOMINAL PAIN: 0
VOMITING: 0
NAUSEA: 0
SHORTNESS OF BREATH: 0
TROUBLE SWALLOWING: 0
BACK PAIN: 0
DIARRHEA: 0
VOICE CHANGE: 0
COUGH: 0

## 2024-05-28 ASSESSMENT — PAIN SCALES - GENERAL
PAINLEVEL_OUTOF10: 0

## 2024-05-28 NOTE — ACP (ADVANCE CARE PLANNING)
Advance Care Planning   Healthcare Decision Maker:    Primary Decision Maker: MARISOL ROSAS - [facility] - [phone]    Click here to complete Healthcare Decision Makers including selection of the Healthcare Decision Maker Relationship (ie \"Primary\").

## 2024-05-28 NOTE — PLAN OF CARE
Problem: Safety - Adult  Goal: Free from fall injury  Outcome: Progressing  Flowsheets (Taken 5/28/2024 0225)  Free From Fall Injury: Instruct family/caregiver on patient safety     Problem: Pain  Goal: Verbalizes/displays adequate comfort level or baseline comfort level  Outcome: Progressing  Flowsheets (Taken 5/28/2024 0225)  Verbalizes/displays adequate comfort level or baseline comfort level:   Encourage patient to monitor pain and request assistance   Assess pain using appropriate pain scale   Administer analgesics based on type and severity of pain and evaluate response   Implement non-pharmacological measures as appropriate and evaluate response   Notify Licensed Independent Practitioner if interventions unsuccessful or patient reports new pain

## 2024-05-28 NOTE — CARE COORDINATION
Case Management Assessment  Initial Evaluation    Date/Time of Evaluation: 5/28/2024 11:09 AM  Assessment Completed by: Griselda Madsen RN    If patient is discharged prior to next notation, then this note serves as note for discharge by case management.    Patient Name: Queta Rosas                   YOB: 1945  Diagnosis: Wide-complex tachycardia [R00.0]                   Date / Time: 5/25/2024  9:15 PM    Patient Admission Status: Inpatient   Readmission Risk (Low < 19, Mod (19-27), High > 27): Readmission Risk Score: 8.3    Current PCP: Destiny Cox MD  PCP verified by CM? Yes    Chart Reviewed: Yes      History Provided by: Patient  Patient Orientation: Alert and Oriented    Patient Cognition: Alert    Hospitalization in the last 30 days (Readmission):  Yes    If yes, Readmission Assessment in CM Navigator will be completed.    Advance Directives:      Code Status: Limited   Patient's Primary Decision Maker is: Legal Next of Kin    Primary Decision Maker: MARISOL ROSAS - Spouse - [phone]    Discharge Planning:    Patient lives with: Spouse/Significant Other Type of Home: House  Primary Care Giver: Self  Patient Support Systems include: Spouse/Significant Other   Current Financial resources:    Current community resources:    Current services prior to admission: None            Current DME:              Type of Home Care services:  None    ADLS  Prior functional level: Independent in ADLs/IADLs  Current functional level: Independent in ADLs/IADLs    PT AM-PAC:   /24  OT AM-PAC:   /24    Family can provide assistance at DC: Yes  Would you like Case Management to discuss the discharge plan with any other family members/significant others, and if so, who? No  Plans to Return to Present Housing: Yes  Other Identified Issues/Barriers to RETURNING to current housing: unknown  Potential Assistance needed at discharge: N/A            Potential DME:    Patient expects to discharge to:

## 2024-05-28 NOTE — CARE COORDINATION
Care Coordination: LOS 3 day.  Transfer from [facility].  Near syncope, sustained wide QRS tachycardia, Vtach.  EP and cardiology consulted. Tentative plan for cardiac cath today. Met with pt at bedside to discuss transition of care. Lives with , independent pta. Follows with Dr Cox, no hx of hhc, Dme or neelam. Uses [facility]. Plan is home at discharge, does not anticipate any home going needs.    Electronically signed by Griselda Madsen RN on 5/28/2024 at 11:06 AM     The Plan for Transition of Care is related to the following treatment goals: dc    The Patientwas provided with a choice of provider and agrees   with the discharge plan. [x] Yes [] No    Freedom of choice list was provided with basic dialogue that supports the patient's individualized plan of care/goals, treatment preferences and shares the quality data associated with the providers. [x] Yes [] No

## 2024-05-29 VITALS
DIASTOLIC BLOOD PRESSURE: 55 MMHG | BODY MASS INDEX: 21.22 KG/M2 | OXYGEN SATURATION: 99 % | HEART RATE: 67 BPM | TEMPERATURE: 98.3 F | WEIGHT: 140 LBS | RESPIRATION RATE: 21 BRPM | SYSTOLIC BLOOD PRESSURE: 123 MMHG | HEIGHT: 68 IN

## 2024-05-29 LAB
ANION GAP SERPL CALCULATED.3IONS-SCNC: 11 MMOL/L (ref 7–16)
BASOPHILS # BLD: 0.06 K/UL (ref 0–0.2)
BASOPHILS NFR BLD: 1 % (ref 0–2)
BUN SERPL-MCNC: 18 MG/DL (ref 6–23)
CALCIUM SERPL-MCNC: 8.6 MG/DL (ref 8.6–10.2)
CHLORIDE SERPL-SCNC: 104 MMOL/L (ref 98–107)
CO2 SERPL-SCNC: 24 MMOL/L (ref 22–29)
CREAT SERPL-MCNC: 1 MG/DL (ref 0.5–1)
ECHO BSA: 1.72 M2
EKG ATRIAL RATE: 122 BPM
EKG ATRIAL RATE: 48 BPM
EKG Q-T INTERVAL: 484 MS
EKG Q-T INTERVAL: 510 MS
EKG QRS DURATION: 178 MS
EKG QRS DURATION: 238 MS
EKG QTC CALCULATION (BAZETT): 484 MS
EKG QTC CALCULATION (BAZETT): 674 MS
EKG R AXIS: -48 DEGREES
EKG R AXIS: -50 DEGREES
EKG T AXIS: -88 DEGREES
EKG T AXIS: -9 DEGREES
EKG VENTRICULAR RATE: 105 BPM
EKG VENTRICULAR RATE: 60 BPM
EOSINOPHIL # BLD: 0.2 K/UL (ref 0.05–0.5)
EOSINOPHILS RELATIVE PERCENT: 2 % (ref 0–6)
ERYTHROCYTE [DISTWIDTH] IN BLOOD BY AUTOMATED COUNT: 12.4 % (ref 11.5–15)
GFR, ESTIMATED: 57 ML/MIN/1.73M2
GLUCOSE SERPL-MCNC: 106 MG/DL (ref 74–99)
HCT VFR BLD AUTO: 37.2 % (ref 34–48)
HGB BLD-MCNC: 12.4 G/DL (ref 11.5–15.5)
IMM GRANULOCYTES # BLD AUTO: 0.03 K/UL (ref 0–0.58)
IMM GRANULOCYTES NFR BLD: 0 % (ref 0–5)
LYMPHOCYTES NFR BLD: 1.58 K/UL (ref 1.5–4)
LYMPHOCYTES RELATIVE PERCENT: 19 % (ref 20–42)
MAGNESIUM SERPL-MCNC: 2.1 MG/DL (ref 1.6–2.6)
MCH RBC QN AUTO: 31.6 PG (ref 26–35)
MCHC RBC AUTO-ENTMCNC: 33.3 G/DL (ref 32–34.5)
MCV RBC AUTO: 94.7 FL (ref 80–99.9)
MONOCYTES NFR BLD: 9 % (ref 2–12)
NEUTROPHILS NFR BLD: 69 % (ref 43–80)
NEUTS SEG NFR BLD: 5.72 K/UL (ref 1.8–7.3)
PHOSPHATE SERPL-MCNC: 3.2 MG/DL (ref 2.5–4.5)
PLATELET # BLD AUTO: 205 K/UL (ref 130–450)
PMV BLD AUTO: 9.7 FL (ref 7–12)
POTASSIUM SERPL-SCNC: 4.2 MMOL/L (ref 3.5–5)
RBC # BLD AUTO: 3.93 M/UL (ref 3.5–5.5)
SODIUM SERPL-SCNC: 139 MMOL/L (ref 132–146)
WBC OTHER # BLD: 8.4 K/UL (ref 4.5–11.5)

## 2024-05-29 PROCEDURE — 97165 OT EVAL LOW COMPLEX 30 MIN: CPT

## 2024-05-29 PROCEDURE — 99239 HOSP IP/OBS DSCHRG MGMT >30: CPT | Performed by: INTERNAL MEDICINE

## 2024-05-29 PROCEDURE — 85025 COMPLETE CBC W/AUTO DIFF WBC: CPT

## 2024-05-29 PROCEDURE — 97161 PT EVAL LOW COMPLEX 20 MIN: CPT

## 2024-05-29 PROCEDURE — 6370000000 HC RX 637 (ALT 250 FOR IP)

## 2024-05-29 PROCEDURE — 83735 ASSAY OF MAGNESIUM: CPT

## 2024-05-29 PROCEDURE — 2580000003 HC RX 258: Performed by: INTERNAL MEDICINE

## 2024-05-29 PROCEDURE — 84100 ASSAY OF PHOSPHORUS: CPT

## 2024-05-29 PROCEDURE — 99291 CRITICAL CARE FIRST HOUR: CPT | Performed by: INTERNAL MEDICINE

## 2024-05-29 PROCEDURE — 80048 BASIC METABOLIC PNL TOTAL CA: CPT

## 2024-05-29 PROCEDURE — 6370000000 HC RX 637 (ALT 250 FOR IP): Performed by: INTERNAL MEDICINE

## 2024-05-29 PROCEDURE — 97530 THERAPEUTIC ACTIVITIES: CPT

## 2024-05-29 RX ORDER — AMIODARONE HYDROCHLORIDE 200 MG/1
200 TABLET ORAL 2 TIMES DAILY
Qty: 42 TABLET | Refills: 0 | Status: SHIPPED | OUTPATIENT
Start: 2024-05-29 | End: 2024-06-19

## 2024-05-29 RX ORDER — AMIODARONE HYDROCHLORIDE 200 MG/1
200 TABLET ORAL DAILY
Status: DISCONTINUED | OUTPATIENT
Start: 2024-06-20 | End: 2024-05-29 | Stop reason: HOSPADM

## 2024-05-29 RX ORDER — AMIODARONE HYDROCHLORIDE 200 MG/1
200 TABLET ORAL 2 TIMES DAILY
Status: DISCONTINUED | OUTPATIENT
Start: 2024-05-29 | End: 2024-05-29 | Stop reason: HOSPADM

## 2024-05-29 RX ORDER — AMIODARONE HYDROCHLORIDE 200 MG/1
200 TABLET ORAL DAILY
Qty: 90 TABLET | Refills: 1 | Status: SHIPPED | OUTPATIENT
Start: 2024-06-20

## 2024-05-29 RX ADMIN — PANTOPRAZOLE SODIUM 40 MG: 40 TABLET, DELAYED RELEASE ORAL at 06:07

## 2024-05-29 RX ADMIN — METOPROLOL SUCCINATE 25 MG: 25 TABLET, EXTENDED RELEASE ORAL at 08:12

## 2024-05-29 RX ADMIN — SODIUM CHLORIDE, PRESERVATIVE FREE 10 ML: 5 INJECTION INTRAVENOUS at 08:13

## 2024-05-29 RX ADMIN — POLYETHYLENE GLYCOL 3350 17 G: 17 POWDER, FOR SOLUTION ORAL at 08:12

## 2024-05-29 RX ADMIN — LEVOTHYROXINE SODIUM 100 MCG: 0.1 TABLET ORAL at 06:07

## 2024-05-29 RX ADMIN — APIXABAN 2.5 MG: 2.5 TABLET, FILM COATED ORAL at 09:27

## 2024-05-29 RX ADMIN — AMIODARONE HYDROCHLORIDE 200 MG: 200 TABLET ORAL at 08:12

## 2024-05-29 RX ADMIN — DOCUSATE SODIUM 100 MG: 100 CAPSULE, LIQUID FILLED ORAL at 08:12

## 2024-05-29 NOTE — PLAN OF CARE
Problem: Chronic Conditions and Co-morbidities  Goal: Patient's chronic conditions and co-morbidity symptoms are monitored and maintained or improved  Outcome: Progressing  Flowsheets  Taken 5/28/2024 2000 by Gene Elliott RN  Care Plan - Patient's Chronic Conditions and Co-Morbidity Symptoms are Monitored and Maintained or Improved:   Monitor and assess patient's chronic conditions and comorbid symptoms for stability, deterioration, or improvement   Collaborate with multidisciplinary team to address chronic and comorbid conditions and prevent exacerbation or deterioration  Taken 5/28/2024 0800 by Arthur Park RN  Care Plan - Patient's Chronic Conditions and Co-Morbidity Symptoms are Monitored and Maintained or Improved:   Monitor and assess patient's chronic conditions and comorbid symptoms for stability, deterioration, or improvement   Collaborate with multidisciplinary team to address chronic and comorbid conditions and prevent exacerbation or deterioration     Problem: Discharge Planning  Goal: Discharge to home or other facility with appropriate resources  Outcome: Progressing  Flowsheets  Taken 5/28/2024 2000 by Gene Elliott RN  Discharge to home or other facility with appropriate resources:   Identify barriers to discharge with patient and caregiver   Arrange for needed discharge resources and transportation as appropriate   Identify discharge learning needs (meds, wound care, etc)  Taken 5/28/2024 0800 by Arthur Park RN  Discharge to home or other facility with appropriate resources:   Arrange for needed discharge resources and transportation as appropriate   Identify barriers to discharge with patient and caregiver     Problem: Safety - Adult  Goal: Free from fall injury  Outcome: Progressing  Flowsheets (Taken 5/28/2024 2000)  Free From Fall Injury: Instruct family/caregiver on patient safety     Problem: Pain  Goal: Verbalizes/displays adequate comfort level or baseline comfort

## 2024-05-29 NOTE — DISCHARGE SUMMARY
within the lungs. 3. The exam is slightly underexposed.       Patient Instructions:      Medication List        START taking these medications      * amiodarone 200 MG tablet  Commonly known as: CORDARONE  Take 1 tablet by mouth 2 times daily for 42 doses     * amiodarone 200 MG tablet  Commonly known as: CORDARONE  Take 1 tablet by mouth daily  Start taking on: June 20, 2024     sacubitril-valsartan 24-26 MG per tablet  Commonly known as: ENTRESTO  Take 1 tablet by mouth 2 times daily           * This list has 2 medication(s) that are the same as other medications prescribed for you. Read the directions carefully, and ask your doctor or other care provider to review them with you.                CONTINUE taking these medications      apixaban 2.5 MG Tabs tablet  Commonly known as: Eliquis  Take 1 tablet by mouth 2 times daily     COLACE PO     levothyroxine 100 MCG tablet  Commonly known as: SYNTHROID     metoprolol succinate 25 MG extended release tablet  Commonly known as: TOPROL XL  take 1 tablet by mouth twice a day     pravastatin 20 MG tablet  Commonly known as: PRAVACHOL     therapeutic multivitamin-minerals tablet            STOP taking these medications      flecainide 50 MG tablet  Commonly known as: TAMBOCOR     lisinopril 5 MG tablet  Commonly known as: PRINIVIL;ZESTRIL            ASK your doctor about these medications      triamcinolone 0.1 % cream  Commonly known as: KENALOG          Medication Instructions:         Sacubitril/Valsartan Oral Tablet (SACUBITRIL/VALSARTAN - ORAL)  For heart failure.  Brand Name(s): Entresto  Generic Name: Sacubitril/Valsartan  Instructions  This medicine may be taken with or without food.  This medicine will work best if you take it at about the same time every day.  Store at room temperature away from heat, light, and moisture. Do not keep in the bathroom.  Talk to your doctor before eating foods with large amounts of potassium. Potassium is often found in salt

## 2024-05-29 NOTE — DISCHARGE INSTRUCTIONS
Follow-up with primary care physician within 1 week of discharge from hospital  Please review changes to pre-hospital admission medications and prescriptions for new medications upon discharge from the hospital with PCP  Please review results of labs and imaging studies with PCP  Follow-up with consultants as directed by them   If recurrence or worsening of symptoms please call primary care physician or return to the ER immediately  Diet: No diet orders on   Activity as tolerated  Be compliant with your medications and take them as prescribed.    Special Instructions: as directed by the consultant     Other than any new prescriptions given to you today, the list of home going meds on this After Visit Summary are based on information provided to us from you. This information, including the list, dose, and frequency of medications is only as accurate as the information you provided. If you have any questions or concerns about your home  medications, please contact your Primary Care Physician for further clarification.    Information obtained by:   By signing below, I understand that if any problems occur once I leave the hospital I am to contact [email or web address]. I understand and acknowledge receipt of the instruction indicated above.

## 2024-05-29 NOTE — PLAN OF CARE
Discussed at bedside with patient. Prior to cardiac cath, patient was LIMITED CODE NO to Intubation, Cardioversion, Chest Compressions. YES to Resuscitative Medications.     Clarified again at bedside at this time. Patient states she wishes to change her code status back to above. Code status changed to LIMITED CODE NO to Intubation, Cardioversion, Chest Compressions. YES to Resuscitative Medications at this time    Electronically signed by Thang Rudd MD on 5/29/2024 at 9:47 AM

## 2024-05-29 NOTE — DISCHARGE INSTR - MEDS
information is subject to Terms of Use.  More information about AMIODARONE - ORAL      Copyright(c) 2023 [employer]  Selected from data included with permission and copyright by [employer]. This copyrighted material has been downloaded from a licensed data provider and is not for distribution, except as may be authorized by the applicable terms of use.  Conditions of Use: The information in this database is intended to supplement, not substitute for the expertise and judgment of healthcare professionals. The information is not intended to cover all possible uses, directions, precautions, drug interactions or adverse effects nor should it be construed to indicate that use of a particular drug is safe, appropriate or effective for you or anyone else. A healthcare professional should be consulted before taking any drug, changing any diet or commencing or discontinuing any course of treatment. The display and use of this drug information is subject to express Terms of Use.  Care instructions adapted under license by [employer]. If you have questions about a medical condition or this instruction, always ask your healthcare professional. [employer] disclaims any warranty or liability for your use of this information.

## 2024-05-29 NOTE — PROGRESS NOTES
HOSPITALIST PROGRESS NOTES     ADMITTING DATE AND TIME: 5/25/2024  9:15 PM  had no chief complaint listed for this encounter.    ADMIT DX: Wide-complex tachycardia [R00.0]    SYNOPSIS: 79-year-old female with past medical history of hypertension, hyperlipidemia, atrial fibrillation on oral anticoagulation, history of stroke and hypothyroidism transferred from [facility] for ventricular tachycardia.  She developed chest tightness and lightheadedness, with this complaint she went to [facility] and she was found to have wide-complex tachycardia.  Patient have pacemaker and she is following with electrophysiologist Dr. Connolly.  She is being transferred for evaluation by electrophysiologist here. Admitted to MICU     SUBJECTIVE:  Patient is being followed for Wide-complex tachycardia [R00.0]     Patient was seen examined and evaluated  Recent lab results, charts and pertinent diagnostic imaging reviewed   Ancillary service notes reviewed   Up in bed, no apparent distress  Heart rate in the 60s on telemetry    ROS: denies fever, chills, cp, sob, n/v, HA unless stated above.      apixaban  2.5 mg Oral BID    docusate sodium  100 mg Oral BID    flecainide  100 mg Oral BID    levothyroxine  100 mcg Oral Daily    [Held by provider] lisinopril  5 mg Oral Daily    metoprolol succinate  25 mg Oral BID    pravastatin  20 mg Oral Daily    sodium chloride flush  5-40 mL IntraVENous 2 times per day    pantoprazole  40 mg Oral QAM AC     sodium chloride flush, 5-40 mL, PRN  sodium chloride, , PRN  ondansetron, 4 mg, Q8H PRN   Or  ondansetron, 4 mg, Q6H PRN  polyethylene glycol, 17 g, Daily PRN  acetaminophen, 650 mg, Q6H PRN   Or  acetaminophen, 650 mg, Q6H PRN         OBJECTIVE:    /69   Pulse 62   Temp 97.5 °F (36.4 °C) (Temporal)   Resp 20   Ht 1.727 m (5' 
                                                                                                                                                HOSPITALIST PROGRESS NOTES     ADMITTING DATE AND TIME: 5/25/2024  9:15 PM  had no chief complaint listed for this encounter.    ADMIT DX: Wide-complex tachycardia [R00.0]    SYNOPSIS: 79-year-old female with past medical history of hypertension, hyperlipidemia, atrial fibrillation on oral anticoagulation, history of stroke and hypothyroidism transferred from [facility] for ventricular tachycardia.  She developed chest tightness and lightheadedness, with this complaint she went to [facility] and she was found to have wide-complex tachycardia.  Patient have pacemaker and she is following with electrophysiologist Dr. Connolly.  She is being transferred for evaluation by electrophysiologist here. Admitted to MICU     SUBJECTIVE:  Patient is being followed for Wide-complex tachycardia [R00.0]   Patient was seen examined and evaluated  Recent lab results, charts and pertinent diagnostic imaging reviewed   Ancillary service notes reviewed   Up in bed, no apparent distress  Heart rate in the 70s on telemetry    Discussed with MICU team     ROS: denies fever, chills, cp, sob, n/v, HA unless stated above.      sacubitril-valsartan  1 tablet Oral BID    [Held by provider] apixaban  2.5 mg Oral BID    docusate sodium  100 mg Oral BID    levothyroxine  100 mcg Oral Daily    metoprolol succinate  25 mg Oral BID    pravastatin  20 mg Oral Daily    sodium chloride flush  5-40 mL IntraVENous 2 times per day    pantoprazole  40 mg Oral QAM AC     sodium chloride flush, 5-40 mL, PRN  sodium chloride, , PRN  ondansetron, 4 mg, Q8H PRN   Or  ondansetron, 4 mg, Q6H PRN  polyethylene glycol, 17 g, Daily PRN  acetaminophen, 650 mg, Q6H PRN   Or  acetaminophen, 650 mg, Q6H PRN         OBJECTIVE:    /63   Pulse 70   Temp 98.6 °F (37 °C) (Temporal)   Resp 18   Ht 1.727 
                                                                                                                                                HOSPITALIST PROGRESS NOTES     ADMITTING DATE AND TIME: 5/25/2024  9:15 PM  had no chief complaint listed for this encounter.    ADMIT DX: Wide-complex tachycardia [R00.0]    SYNOPSIS: 79-year-old female with past medical history of hypertension, hyperlipidemia, atrial fibrillation on oral anticoagulation, history of stroke and hypothyroidism transferred from [facility] for ventricular tachycardia.  She developed chest tightness and lightheadedness, with this complaint she went to [facility] and she was found to have wide-complex tachycardia.  Patient have pacemaker and she is following with electrophysiologist Dr. Connolly.  She is being transferred for evaluation by electrophysiologist here. Admitted to MICU     SUBJECTIVE:  Patient is being followed for Wide-complex tachycardia [R00.0]   Patient was seen examined and evaluated  Recent lab results, charts and pertinent diagnostic imaging reviewed   Ancillary service notes reviewed   For cardiac cath today     Discussed with MICU team     ROS: denies fever, chills, cp, sob, n/v, HA unless stated above.      [Held by provider] sacubitril-valsartan  1 tablet Oral BID    polyethylene glycol  17 g Oral Daily    senna  1 tablet Oral Nightly    amiodarone  200 mg Oral Daily    [Held by provider] apixaban  2.5 mg Oral BID    docusate sodium  100 mg Oral BID    levothyroxine  100 mcg Oral Daily    metoprolol succinate  25 mg Oral BID    pravastatin  20 mg Oral Daily    sodium chloride flush  5-40 mL IntraVENous 2 times per day    pantoprazole  40 mg Oral QAM AC     perflutren lipid microspheres, 1.5 mL, ONCE PRN  sodium chloride flush, 5-40 mL, PRN  sodium chloride, , PRN  ondansetron, 4 mg, Q8H PRN   Or  ondansetron, 4 mg, Q6H PRN  acetaminophen, 650 mg, Q6H PRN   Or  acetaminophen, 650 mg, Q6H PRN     
  OT consult received and appreciated. Chart reviewed, discussed at AM rounds and consulted RN. Will hold session this date- pt off unit for heart cath. Will evaluate at a later time. Thank you. Henny Gupta, OTR/L # [id number]  
 Belongings  cell phone  Jacket , shirt, pants, bra, underwear,shoes    
..4 Eyes Skin Assessment     NAME:  Queta Queen  YOB: 1945  MEDICAL RECORD NUMBER:  [id number]    The patient is being assessed for  Admission    I agree that at least one RN has performed a thorough Head to Toe Skin Assessment on the patient. ALL assessment sites listed below have been assessed.      Areas assessed by both nurses:    Head, Face, Ears, Shoulders, Back, Chest, Arms, Elbows, Hands, Sacrum. Buttock, Coccyx, Ischium, Legs. Feet and Heels, and Under Medical Devices         Does the Patient have a Wound? No noted wound(s)       Flo Prevention initiated by RN: Yes  Wound Care Orders initiated by RN: No    Pressure Injury (Stage 3,4, Unstageable, DTI, NWPT, and Complex wounds) if present, place Wound referral order by RN under : No    New Ostomies, if present place, Ostomy referral order under : No     Nurse 1 eSignature: Electronically signed by Peace Upton RN on 5/25/24 at 11:18 PM EDT    **SHARE this note so that the co-signing nurse can place an eSignature**    Nurse 2 eSignature: Electronically signed by Toñito Saenz RN on 5/25/24 at 11:21 PM EDT   
[facility]   Department of Internal Medicine   Internal Medicine Residency  MICU Progress Note    Patient:  Queta Queen 79 y.o. female   MRN: [id number]       Date of Service: 5/29/2024    Allergy: Patient has no known allergies.    Subjective     Patient was seen and examined this morning at bedside in no acute distress.   Overnight, electrolytes replaced        Objective       I & O - 24hr:    Intake/Output Summary (Last 24 hours) at 5/29/2024 1325  Last data filed at 5/29/2024 0600  Gross per 24 hour   Intake 240 ml   Output --   Net 240 ml       Physical Exam  Vitals: BP (!) 121/57   Pulse 69   Temp 98.3 °F (36.8 °C) (Oral)   Resp 20   Ht 1.727 m (5' 8\")   Wt 63.5 kg (140 lb)   SpO2 99%   BMI 21.29 kg/m²     I & O - 24hr: No intake/output data recorded.   General Appearance: alert, appears stated age, and cooperative  HEENT:  Head: Normal, normocephalic, atraumatic.  Lung: clear to auscultation bilaterally  Heart: regular rate and rhythm  Abdomen: soft, non-tender; bowel sounds normal; no masses,  no organomegaly  Extremities:  extremities normal, atraumatic, no cyanosis or edema  Musculokeletal: No joint swelling, no muscle tenderness. ROM normal in all joints of extremities.   Neurologic: Mental status: Alert, oriented, thought content appropriate      Medications     Continuous Infusions:   sodium chloride      sodium chloride       Scheduled Meds:   amiodarone  200 mg Oral BID    [START ON 6/20/2024] amiodarone  200 mg Oral Daily    sodium chloride flush  5-40 mL IntraVENous 2 times per day    [Held by provider] sacubitril-valsartan  1 tablet Oral BID    polyethylene glycol  17 g Oral Daily    senna  1 tablet Oral Nightly    apixaban  2.5 mg Oral BID    docusate sodium  100 mg Oral BID    levothyroxine  100 mcg Oral Daily    metoprolol succinate  25 mg Oral BID    pravastatin  20 mg Oral Daily    sodium chloride flush  5-40 mL IntraVENous 2 times per day    pantoprazole  40 mg 
Discharge instructions given with all questions answered. IV removed intact with no complications.   
[facility]   Department of Internal Medicine   Internal Medicine Residency  MICU Progress Note    Patient:  Queta Queen 79 y.o. female   MRN: [id number]       Date of Service: 5/28/2024    Allergy: Patient has no known allergies.    Subjective     Patient was seen and examined this morning at bedside in no acute distress.     Overnight,   No acute events     Objective       I & O - 24hr:    Intake/Output Summary (Last 24 hours) at 5/28/2024 1614  Last data filed at 5/28/2024 1302  Gross per 24 hour   Intake 250 ml   Output 5 ml   Net 245 ml       Physical Exam  Vitals: /66   Pulse 75   Temp 99.1 °F (37.3 °C)   Resp 16   Ht 1.727 m (5' 8\")   Wt 64.2 kg (141 lb 8.6 oz)   SpO2 94%   BMI 21.52 kg/m²     I & O - 24hr: I/O this shift:  In: -   Out: 5 [Blood:5]   General Appearance: alert and cooperative  HEENT:  Head: Normal, normocephalic, atraumatic.  Neck: supple, symmetrical, trachea midline  Lung: clear to auscultation bilaterally  Heart: irregularly irregular rhythm  Abdomen: soft, non-tender; bowel sounds normal; no masses,  no organomegaly  Extremities:  extremities normal, atraumatic, no cyanosis or edema  Musculokeletal: No joint swelling, no muscle tenderness. ROM normal in all joints of extremities.   Neurologic: Mental status: Alert, oriented, thought content appropriate      Medications     Continuous Infusions:   sodium chloride       Scheduled Meds:   [Held by provider] sacubitril-valsartan  1 tablet Oral BID    polyethylene glycol  17 g Oral Daily    senna  1 tablet Oral Nightly    amiodarone  200 mg Oral Daily    [Held by provider] apixaban  2.5 mg Oral BID    docusate sodium  100 mg Oral BID    levothyroxine  100 mcg Oral Daily    metoprolol succinate  25 mg Oral BID    pravastatin  20 mg Oral Daily    sodium chloride flush  5-40 mL IntraVENous 2 times per day    pantoprazole  40 mg Oral QAM AC     PRN Meds: perflutren lipid microspheres, sodium chloride flush, sodium 
[facility]   Department of Internal Medicine   Internal Medicine Residency  MICU Progress Note    Patient:  Queta Queen 79 y.o. female   MRN: [id number]       Date of Service: 5/27/2024    Allergy: Patient has no known allergies.    Subjective     Patient was seen and examined this morning at bedside in no acute distress.     Overnight,   K 1800 is 4.6    Objective       I & O - 24hr:  No intake or output data in the 24 hours ending 05/27/24 1402    Physical Exam  Vitals: /63   Pulse 70   Temp 98.6 °F (37 °C) (Temporal)   Resp 18   Ht 1.727 m (5' 8\")   Wt 61.5 kg (135 lb 9.3 oz)   SpO2 98%   BMI 20.62 kg/m²     I & O - 24hr: No intake/output data recorded.   General Appearance: alert and cooperative  HEENT:  Head: Normal, normocephalic, atraumatic.  Neck: supple, symmetrical, trachea midline  Lung: clear to auscultation bilaterally  Heart: irregularly irregular rhythm  Abdomen: soft, non-tender; bowel sounds normal; no masses,  no organomegaly  Extremities:  extremities normal, atraumatic, no cyanosis or edema  Musculokeletal: No joint swelling, no muscle tenderness. ROM normal in all joints of extremities.   Neurologic: Mental status: Alert, oriented, thought content appropriate      Medications     Continuous Infusions:   sodium chloride       Scheduled Meds:   [Held by provider] sacubitril-valsartan  1 tablet Oral BID    polyethylene glycol  17 g Oral Daily    senna  1 tablet Oral Nightly    amiodarone  200 mg Oral Daily    [Held by provider] apixaban  2.5 mg Oral BID    docusate sodium  100 mg Oral BID    levothyroxine  100 mcg Oral Daily    metoprolol succinate  25 mg Oral BID    pravastatin  20 mg Oral Daily    sodium chloride flush  5-40 mL IntraVENous 2 times per day    pantoprazole  40 mg Oral QAM AC     PRN Meds: sodium chloride flush, sodium chloride, ondansetron **OR** ondansetron, acetaminophen **OR** acetaminophen      Labs and Imaging Studies     Labs    CBC:   Lab 
OCCUPATIONAL THERAPY INITIAL EVALUATION    [facility] [address] [place], [place]     Date:2024  Patient Name: Queta Queen  MRN: [id number]  : 1945  Room: [address] [place] [address]      Evaluating OT: Henny Gupta OTD, OTR/L, [id number]    Referring Provider:: Destiny Sherman MD      Specific Provider Orders/Date: OT eval and treat (24)    AM-PAC Daily Activity Raw Score:     Recommended Adaptive Equipment: None   Procedures: Left heart cath:     Diagnosis: Wide-complex tachycardia [R00.0]  Pertinent Medical History: has a past medical history of Arthritis, Atrial fibrillation (HCC), Cardiomyopathy (HCC), Hyperlipidemia, Hypertension, hypothyroidism, and Ischemic stroke (HCC).  Precautions: Falls     Assessment of current deficits   [] Functional mobility   []ADLs  [] Strength               []Cognition   [] Functional transfers   [] IADLs         [] Safety Awareness   []Endurance   [] Fine Coordination              [] Balance      [] Vision/perception   []Sensation    []Gross Motor Coordination  [] ROM  [] Delirium                   [] Motor Control     OT PLAN OF CARE   OT POC based on physician orders, patient diagnosis and results of clinical assessment    Frequency/Duration : NA  Specific OT Treatment to include: NA      Home Living: Pt lives with  in a 1 story home with basement, 1 flight down with 1 flight and 1 HR down, 1 step(s) to enter and no rail(s); bed/bath on main level.   Bathroom setup: walk-in shower  Equipment owned: Non  Prior Level of Function: Independent  with ADLs , Independent  with IADLs; using no AD for ambulation.   Driving: yes    Pain Level: 0/10 pain reported  Cognition: A&O: /4; Follows multi step directions   Memory:  good    Sequencing:  good    Problem solving:  good    Judgement/safety:  good      Functional Assessment:   Initial Eval Status  Date: 24   Feeding Independent      Grooming 
Physical Therapy    PT consult to evaluate/treat received and appreciated.  Pt chart reviewed and evaluation attempted.  Pt is currently off unit for cardiac cath.  Will check back as able.  Thank you.        Chidi Cochran, PT, DPT   [id number]       
Physical Therapy    Physical Therapy Initial Assessment     Name: Queta Queen  : 1945  MRN: [id number]      Date of Service: 2024    Evaluating PT:  Chidi Cochran, PT, DPT  [id number]     Room #:  4419/4419-A  Diagnosis:  Wide-complex tachycardia [R00.0]  PMHx/PSHx:   has a past medical history of Arthritis, Atrial fibrillation (HCC), Cardiomyopathy (HCC), Hyperlipidemia, Hypertension, hypothyroidism, and Ischemic stroke (HCC).   Procedure/Surgery:  Heart cath   Precautions:  Falls, Monitor HR  Equipment Needs:  NA    SUBJECTIVE:    Pt lives with her  in a 1 story home with 1 step and 0 rail to enter.  Bedroom and bathroom are on the 1st level with full flight and rail to basement.  Pt ambulated with no AD, independent PTA.    OBJECTIVE:   Initial Evaluation  Date: 24 Treatment Short Term/ Long Term   Goals   AM-PAC 6 Clicks      Was pt agreeable to Eval/treatment? Yes      Does pt have pain? No c/o pain     Bed Mobility  Rolling: Independent   Supine to sit: Independent   Sit to supine: NT  Scooting: Independent   Rolling: Independent   Supine to sit: Independent   Sit to supine: Independent   Scooting: Independent    Transfers Sit to stand: Independent   Stand to sit: Independent   Stand pivot: Independent   Sit to stand: Independent   Stand to sit: Independent   Stand pivot: Independent    Ambulation    400 feet with no AD SBA  >500 feet with no AD Independent    Stair negotiation: ascended and descended  NT  >8 steps with 1 rail Independent    ROM BUE:  Per OT eval   BLE:  WFL     Strength BUE:  Per OT eval   BLE:  WFL     Balance Sitting EOB:  Independent   Dynamic Standing:  SBA  Sitting EOB:  Independent   Dynamic Standing:  Independent      Pt is A & O x 4  RASS:  0  CAM-ICU:  --  Sensation:  Pt denies numbness and tingling to extremities  Edema:  Unremarkable    Vitals:  Heart Rate at rest 87 bpm  Heart Rate post session 86 bpm    SPO2 at rest 98% on RA SPO2 post 
Pt. Did not eat breakfast has been NPO since dinner last night   
Pt. Reports 2 heavy nosebleeds overnight, lasting 10-15 minutes, patient states she doesn't have nosebleeds at home.   
[facility]   Department of Internal Medicine   Internal Medicine Residency  MICU Progress Note    Patient:  Queta Queen 79 y.o. female   MRN: [id number]       Date of Service: 2024   Admission date: 2024  9:15 PM ; Hospital day: 1   ICU day: 10h    Allergy: Patient has no known allergies.    Subjective     Today:  Queta Queen was seen and examined at bedside. She is saturating normal in room air, denies any acute symptoms.     Objective   PHYSICAL EXAM  General Appearance: in no acute distress  HEENT: Normocephalic, atraumatic  Neck: midline trachea  Lung: clear to auscultation bilaterally  Heart: regular rate and rhythm, no murmur  Abdomen: soft, bowel sounds normal; no masses  Extremities: no cyanosis or edema  Musculokeletal: No joint swelling  Neurologic: Mental status: AAOx3, follows commands, reflexes normal and symmetric    CARDIOVASCULAR  Pulse rate range      : Pulse  Av.2  Min: 60  Max: 128  BP range                  : Systolic (24hrs), Av , Min:104 , Max:160   ; Diastolic (24hrs), Av, Min:49, Max:90    Arterial BP       Systolic BP/Diastolic BP & MAP    PULMONARY  Respiration range   : Resp  Av.1  Min: 3  Max: 36  Pulse Ox range : SpO2  Av.6 %  Min: 90 %  Max: 100 %  No results for input(s): \"PH\", \"PCO2\", \"PO2\", \"HCO3\", \"BE\", \"O2SAT\", \"THB\" in the last 72 hours.    Invalid input(s): \"PFRATIO\"        NEPHROLOGY (FLUIDS/ELECTROLYTES & NUTRITION)  Urine: 150 mL   I & O - 24hr:    Intake/Output Summary (Last 24 hours) at 2024 0758  Last data filed at 2024 0400  Gross per 24 hour   Intake 820 ml   Output 550 ml   Net 270 ml     Net IO Since Admission: 270 mL [24 0758]  Recent Labs     24  1458 24  0023 24  0353   CREATININE 1.2* 1.1* 1.1*     GASTROENTEROLOGY     Diet:   ADULT DIET; Regular     Labs and Imaging Studies     Labs  Recent Labs     24  1458 24  0023 24  0353   WBC 6.8 7.6 7.7   RBC 
laterally displaced PMI, normal S1 and S2 with left sternal border and apex, no murmurs noted.  Lungs: clear to auscultation bilaterally, normal respiratory effort without used of accessory muscles  Abdomen: soft, non-tender, nondistended  Musculoskeletal: no digital clubbing, no edema   Skin: warm, no rashes     I have personally reviewed the laboratory, cardiac diagnostic and radiographic testing as outlined below:    Data:    Recent Labs     24  0023 24  0353 24  0444   WBC 7.6 7.7 7.4   HGB 11.8 11.7 11.6   HCT 35.0 35.4 35.0    193 176     Recent Labs     24  0023 24  0353 24  1800 24  0444    139  --  142   K 3.8 4.0 4.6 4.1   CL 98 99  --  106   CO2 31* 28  --  23   BUN 23 23  --  22   CREATININE 1.1* 1.1*  --  1.2*   CALCIUM 8.3* 8.5*  --  8.6      Lab Results   Component Value Date/Time    MG 2.0 2024 04:44 AM     Recent Labs     24  1458   TSH 2.48     No results for input(s): \"INR\" in the last 72 hours.    CXR: 2024  FINDINGS:  There are findings of cardiomegaly. No acute process identified within the  lungs. No evidence of pneumothorax. Mediastinal structures demonstrate no  acute abnormality. Osseous structures are within normal limits. No pleural  fluid detected.  Unchanged left dual lead pacemaker device.  The exam is  mildly underexposed.     IMPRESSION:  1. Cardiomegaly.  2. No acute process identified within the lungs.  3. The exam is slightly underexposed.              Specimen Collected: 24 16:07 EDT             Telemetry: Atrial paced rhythm, frequent atrial ectopy    EK24: Wide QRS tachycardia, QRS with more than 400 ms  24: Atrial paced, ventricular sensed rhythm    Echocardiogram:       Summary   Limited echo to assess LVEF.      LV systolic function is low normal.   Ejection fraction is visually estimated at 50-55%.   Bileaflet mitral valve prolapse.   Moderate mitral regurgitation is present 
\"TSH\" in the last 72 hours.    No results for input(s): \"INR\" in the last 72 hours.    CXR: 2024  FINDINGS:  There are findings of cardiomegaly. No acute process identified within the  lungs. No evidence of pneumothorax. Mediastinal structures demonstrate no  acute abnormality. Osseous structures are within normal limits. No pleural  fluid detected.  Unchanged left dual lead pacemaker device.  The exam is  mildly underexposed.     IMPRESSION:  1. Cardiomegaly.  2. No acute process identified within the lungs.  3. The exam is slightly underexposed.              Specimen Collected: 24 16:07 EDT             Telemetry: Atrial paced rhythm.     EK24: Wide QRS tachycardia, QRS with more than 400 ms  24: Atrial paced, ventricular sensed rhythm      TTE 2024:     Left Ventricle: Normal left ventricular systolic function with a visually estimated EF of 60 - 65%. Left ventricle size is normal. Mild posterior thickening. Normal wall motion. Grade II diastolic dysfunction with increased LAP.    Aortic Valve: Trileaflet valve.    Mitral Valve: Findings consistent with myxomatous degeneration. Moderate annular calcification of the mitral valve. Mild leaflets prolapse noted. Moderate regurgitation.    Tricuspid Valve: Mild to moderate regurgitation. Mildly elevated RVSP, consistent with mild pulmonary hypertension.    Pericardium: Trivial pericardial effusion present.    Image quality is adequate. Contrast used: Definity.    Echocardiogram:       Summary   Limited echo to assess LVEF.      LV systolic function is low normal.   Ejection fraction is visually estimated at 50-55%.   Bileaflet mitral valve prolapse.   Moderate mitral regurgitation is present directed posteriorly.   Mild to moderate tricuspid regurgitation.      Signature      ----------------------------------------------------------------   Electronically signed by Graham Fonseca MD(Interpreting   physician) on 2023 08:26 AM   
ventricular ejection fraction was calculated to be 51%, with normal myocardial thickening and wall motion.     Impression:    Electrocardiographically normal regadenoson infusion with a clinically  non-ischemic response when compared to the baseline ECG  Myocardial perfusion imaging was normal with attenuation artifact.    Overall left ventricular systolic function was normal without regional wall motion abnormalities.  4.   Low risk general pharmacologic stress test.     Thank you for sending your patient to this [facility] Accredited Facility.      Electronically signed by Nohemi Martinez MD on 6/16/2022 at 12:27 PM      Cardiac MRI  8/22    CONCLUSIONS:     Moderate increased mitral valve thickening with bileaflet prolapse.   Moderate MR with eccentric jet posteriorly directed. RF: 22%.   Singal void in the posterior mitral annulus in SSFP sequences, which   likely represents severe calcification.     The left ventricle is normal in size (LV EDVi = 82 ml/m²).   The left ventricular systolic function is mildly decreased (LV EF = 42   %). Delayed-enhancement imaging are limited due to suceptibility artifact   at the base of the ventricle most likely due to calcium.   Overall, there is no prior ischemic myocardial damage or convincing   enhancement to suggest myocardial fibrosis/infiltrative process.     The right ventricle is normal in size (RV EDVi = 71 ml/m²). The right   ventricular systolic function is mildly decreased (RV EF = 43 %).     Small pericardial effusion     Electronically signed by Paul Schoenhagen MD on 8/1/2022 at 5:39:27 PM         Outpatient Monitor:   1/23-2/5/23    Predominant underlying rhythm was Sinus Rhythm. 5 Supraventricular  Tachycardia runs occurred, the run with the longest lasting 11  beats. 1022 Pauses occurred, the longest lasting 6.9 secs (9 bpm).  Junctional Rhythm was present. Isolated SVEs were rare (<1.0%), SVE  Couplets were rare (<1.0%), and SVE Triplets were rare

## 2024-05-29 NOTE — PLAN OF CARE
Problem: Chronic Conditions and Co-morbidities  Goal: Patient's chronic conditions and co-morbidity symptoms are monitored and maintained or improved  5/29/2024 0857 by Trice Claros RN  Outcome: Progressing  Flowsheets (Taken 5/29/2024 0800)  Care Plan - Patient's Chronic Conditions and Co-Morbidity Symptoms are Monitored and Maintained or Improved: Monitor and assess patient's chronic conditions and comorbid symptoms for stability, deterioration, or improvement     Problem: Discharge Planning  Goal: Discharge to home or other facility with appropriate resources  5/29/2024 0857 by Trice Claros RN  Outcome: Progressing  Flowsheets (Taken 5/29/2024 0800)  Discharge to home or other facility with appropriate resources: Identify barriers to discharge with patient and caregiver     Problem: Safety - Adult  Goal: Free from fall injury  5/29/2024 0857 by Trice Claros RN  Outcome: Progressing     Problem: Pain  Goal: Verbalizes/displays adequate comfort level or baseline comfort level  5/29/2024 0857 by Trice Claros RN  Outcome: Progressing

## 2024-06-05 ENCOUNTER — NURSE ONLY (OUTPATIENT)
Dept: NON INVASIVE DIAGNOSTICS | Age: 79
End: 2024-06-05
Payer: MEDICARE

## 2024-06-05 DIAGNOSIS — I48.19 PERSISTENT ATRIAL FIBRILLATION (HCC): Primary | ICD-10-CM

## 2024-06-05 PROCEDURE — 93000 ELECTROCARDIOGRAM COMPLETE: CPT | Performed by: INTERNAL MEDICINE

## 2024-06-05 NOTE — PROGRESS NOTES
EKG preformed today as per Dr Armando Connolly, for S/P starting Amiodarone.     BP:  120/60  right upper arm Sitting  P:  71  SP O2: 99%    Pt states she gets light headed and no energy.    EKG done and pt advised it will be sent to the doctor to review and we will call with any further recommendations.     Electronically signed by Queta Raymundo MA on 6/5/2024 at 1:50 PM

## 2024-06-06 ENCOUNTER — TELEPHONE (OUTPATIENT)
Dept: NON INVASIVE DIAGNOSTICS | Age: 79
End: 2024-06-06

## 2024-06-06 NOTE — TELEPHONE ENCOUNTER
Patient states that  she has been taking amiodarone  for about six days and has been having dizziness, nausea, fatigue. Patient denies chest pain, SOB, or palpitations. Patient feels symptoms are worsening daily Please advise

## 2024-06-07 NOTE — TELEPHONE ENCOUNTER
Patient informed of recommendation per Dr. Connolly. The patient will call next week to let us know how she feels with dosage change.       Electronically signed by Rekha Giang MA on 6/7/2024 at 3:37 PM

## 2024-06-11 ENCOUNTER — TELEPHONE (OUTPATIENT)
Dept: CARDIOLOGY CLINIC | Age: 79
End: 2024-06-11

## 2024-06-11 ENCOUNTER — NURSE ONLY (OUTPATIENT)
Dept: CARDIOLOGY CLINIC | Age: 79
End: 2024-06-11

## 2024-06-11 NOTE — TELEPHONE ENCOUNTER
Patient notified BP is still low and to D/C Entresto and to decrease Metoprolol to 25 mg daily per Dr. Fonseca's recommendations. Patient notified to stay well hydrated and to go to ER if worsen's and follow up as scheduled Chart reflects changes.

## 2024-06-11 NOTE — TELEPHONE ENCOUNTER
BP is low.  Stop sacubitril/valsartan (started by EP in the hospital, changed from lisinopril).  Decrease metoprolol to 25 mg once daily (previously increased by EP after PPM)  Stay well-hydrated.  ER if symptoms worsen.  Follow-up as scheduled.

## 2024-06-11 NOTE — TELEPHONE ENCOUNTER
Patient in today for a orthostatic BP check. Results are as follows:     Lying BP 88/60 HR 71    Sitting BP 94/62 HR 71    Standing BP 80/52 HR 69

## 2024-06-11 NOTE — TELEPHONE ENCOUNTER
Patient was told by Dr. Connolly's office to contact  Dr. Fonseca's office to see if symptoms of  orthostatic hypotension due to Toprol XL and Entresto.  Patient is still experiencing for over a week dizziness and fatigue with exertion. Patient denies chest pain , SOB, swelling or palpitations. Please Advise

## 2024-08-01 ENCOUNTER — OFFICE VISIT (OUTPATIENT)
Dept: CARDIOLOGY CLINIC | Age: 79
End: 2024-08-01
Payer: MEDICARE

## 2024-08-01 VITALS
BODY MASS INDEX: 19.35 KG/M2 | HEART RATE: 18 BPM | SYSTOLIC BLOOD PRESSURE: 98 MMHG | HEIGHT: 68 IN | RESPIRATION RATE: 18 BRPM | WEIGHT: 127.7 LBS | DIASTOLIC BLOOD PRESSURE: 60 MMHG

## 2024-08-01 DIAGNOSIS — I50.42 CHRONIC COMBINED SYSTOLIC AND DIASTOLIC HEART FAILURE (HCC): ICD-10-CM

## 2024-08-01 DIAGNOSIS — E78.5 HYPERLIPIDEMIA, UNSPECIFIED HYPERLIPIDEMIA TYPE: ICD-10-CM

## 2024-08-01 DIAGNOSIS — I49.5 SINUS NODE DYSFUNCTION (HCC): ICD-10-CM

## 2024-08-01 DIAGNOSIS — I34.1 MITRAL VALVE PROLAPSE: ICD-10-CM

## 2024-08-01 DIAGNOSIS — I42.9 CARDIOMYOPATHY, UNSPECIFIED TYPE (HCC): ICD-10-CM

## 2024-08-01 DIAGNOSIS — I34.0 NONRHEUMATIC MITRAL VALVE REGURGITATION: Primary | ICD-10-CM

## 2024-08-01 PROCEDURE — 1123F ACP DISCUSS/DSCN MKR DOCD: CPT | Performed by: INTERNAL MEDICINE

## 2024-08-01 PROCEDURE — 99214 OFFICE O/P EST MOD 30 MIN: CPT | Performed by: INTERNAL MEDICINE

## 2024-08-01 PROCEDURE — 93000 ELECTROCARDIOGRAM COMPLETE: CPT | Performed by: INTERNAL MEDICINE

## 2024-08-01 NOTE — PROGRESS NOTES
OUTPATIENT CARDIOLOGY FOLLOW-UP    Name: Queta Queen    Age: 79 y.o.    Primary Care Physician: Destiny Cox MD    Date of Service: 8/1/2024    Chief Complaint:   Chief Complaint   Patient presents with    3 Month Follow-Up        Interim History:   Here for follow-up regarding atrial fibrillation and cardiomyopathy.  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  Never had any neurologic deficits.  She was found to be in new onset of atrial fibrillation of unknown duration to which the stroke was attributed.  Echo showed EF of 35 to 40%.      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  Cardioversion was initially successful and she went back into atrial fibrillation after 5 minutes.  Subsequent cardiac MRI confirmed moderate MR and I referred her to EP.  Rhythm control was initially achieved with amiodarone, she underwent pacemaker implantation 3/2023 and was subsequently changed to flecainide required another cardioversion.    Last saw her 4/2024.  Following month she was having increased AF burden and flecainide was increased.  She then presented in May with wide-complex tachycardia suspected VT treated with IV lidocaine.  Underwent heart cath showed mild CAD.  Echo showed normal EF and moderate MR.    Stomach there was quite a bit of confusion after that hospitalization, for some reason electrophysiology changes lisinopril to sacubitril/valsartan though EF has been normal.  But it sounds like she was taking both after discharge, she was then hypotensive and dizzy call the office.  I told her to stop taking sacubitril/valsartan and she no longer is.  She is not taking lisinopril either.  She is not sure what dose of amiodarone she is taking.  She now feels great.    Review of Systems:   Negative except

## 2024-08-02 ENCOUNTER — TELEPHONE (OUTPATIENT)
Dept: CARDIOLOGY CLINIC | Age: 79
End: 2024-08-02

## 2024-08-02 NOTE — TELEPHONE ENCOUNTER
Patient states she is taking Amiodarone 100 mg daily. Patient would like to know  why her Eliquis was stopped. Please Advise

## 2024-09-23 ENCOUNTER — OFFICE VISIT (OUTPATIENT)
Dept: NON INVASIVE DIAGNOSTICS | Age: 79
End: 2024-09-23
Payer: MEDICARE

## 2024-09-23 VITALS
DIASTOLIC BLOOD PRESSURE: 84 MMHG | SYSTOLIC BLOOD PRESSURE: 132 MMHG | HEIGHT: 66 IN | BODY MASS INDEX: 20.96 KG/M2 | RESPIRATION RATE: 16 BRPM | WEIGHT: 130.4 LBS | HEART RATE: 66 BPM

## 2024-09-23 DIAGNOSIS — Z95.0 CARDIAC PACEMAKER IN SITU: ICD-10-CM

## 2024-09-23 DIAGNOSIS — Z79.01 ANTICOAGULATED BY ANTICOAGULATION TREATMENT: ICD-10-CM

## 2024-09-23 DIAGNOSIS — I48.19 PERSISTENT ATRIAL FIBRILLATION (HCC): Primary | ICD-10-CM

## 2024-09-23 DIAGNOSIS — Z79.899 ON AMIODARONE THERAPY: ICD-10-CM

## 2024-09-23 DIAGNOSIS — I49.5 SINUS NODE DYSFUNCTION (HCC): ICD-10-CM

## 2024-09-23 PROCEDURE — 1123F ACP DISCUSS/DSCN MKR DOCD: CPT | Performed by: NURSE PRACTITIONER

## 2024-09-23 PROCEDURE — 99214 OFFICE O/P EST MOD 30 MIN: CPT | Performed by: NURSE PRACTITIONER

## 2024-09-23 RX ORDER — FLUOCINONIDE 0.5 MG/G
CREAM TOPICAL
COMMUNITY
Start: 2024-09-18

## 2024-10-08 ENCOUNTER — TELEPHONE (OUTPATIENT)
Dept: CARDIOLOGY CLINIC | Age: 79
End: 2024-10-08

## 2024-10-08 NOTE — TELEPHONE ENCOUNTER
Patient called in states her EGFR has dropped from 48 on  10/5/24 to 40 on 10/7/24 in a few days. Patient is asymptomatic labs scanned in chart. Please Advise

## 2024-12-05 PROCEDURE — 93294 REM INTERROG EVL PM/LDLS PM: CPT | Performed by: INTERNAL MEDICINE

## 2024-12-05 PROCEDURE — 93296 REM INTERROG EVL PM/IDS: CPT | Performed by: INTERNAL MEDICINE

## 2024-12-23 RX ORDER — AMIODARONE HYDROCHLORIDE 100 MG/1
100 TABLET ORAL DAILY
Qty: 90 TABLET | Refills: 1 | Status: SHIPPED | OUTPATIENT
Start: 2024-12-23

## 2024-12-23 RX ORDER — AMIODARONE HYDROCHLORIDE 100 MG/1
100 TABLET ORAL DAILY
COMMUNITY
End: 2024-12-23 | Stop reason: SDUPTHER

## 2024-12-23 RX ORDER — AMIODARONE HYDROCHLORIDE 200 MG/1
200 TABLET ORAL DAILY
Qty: 90 TABLET | OUTPATIENT
Start: 2024-12-23

## 2024-12-23 NOTE — TELEPHONE ENCOUNTER
Error, being filled by another physician.     Electronically signed by Rekha Giang MA on 12/23/2024 at 10:34 AM

## 2024-12-30 ENCOUNTER — TELEPHONE (OUTPATIENT)
Dept: NON INVASIVE DIAGNOSTICS | Age: 79
End: 2024-12-30

## 2024-12-30 NOTE — TELEPHONE ENCOUNTER
The patient called in again with questions about the dosage of amiodarone. We discussed this in detail last week because she was taking a 200 MG tablet and cutting it in half. She asked if she could get 100 MG tablets so she did not have to cut them. I changed the script in her chart and submitted a refill request for the 100 MG table to be taken QD. The patient said she should be taking 100 MG BID. I advised if that is the case, she would be taking a total of 200 MG a day (what she was on before). There seemed to be confusion on that. I advised the patient, per notes on 9/23/24 of [place]ia Meier, \"amiodarone resumed, she did not tolerate 200 mg daily and changed to 100 mg daily and tolerating this dose.\" The patient said she will stay on the lower dose for now and see what happens.     Electronically signed by Rekha Giang MA on 12/30/2024 at 1:36 PM

## 2025-01-27 ENCOUNTER — TELEPHONE (OUTPATIENT)
Dept: NON INVASIVE DIAGNOSTICS | Age: 80
End: 2025-01-27

## 2025-01-27 NOTE — TELEPHONE ENCOUNTER
----- Message from [place]ia CA sent at 1/27/2025 11:14 AM EST -----  Patient has been in atrial fibrillation. On amiodarone. From previous note, she is on 100 mg daily.   Can you please discuss with her increasing this to 200 mg daily again and see if she tolerates it.   Can you see if she can schedule a cardioversion to convert her back to normal rhythm if she is interested.  [place]ia Meier, APRN - CNP

## 2025-01-27 NOTE — TELEPHONE ENCOUNTER
Spoke with patient and they verbalized understanding.  Patient will increase Amiodarone and see if she converts by herself.  Patient stated she may not do the DCCV now and may wait until she is seen by Dr Connolly in March 2025.  Patient will call office if she becomes more symptomatic and wishes to pursue DCCV.

## 2025-02-24 DIAGNOSIS — I48.19 PERSISTENT ATRIAL FIBRILLATION (HCC): Primary | ICD-10-CM

## 2025-02-24 DIAGNOSIS — Z79.899 ON AMIODARONE THERAPY: ICD-10-CM

## 2025-02-24 RX ORDER — AMIODARONE HYDROCHLORIDE 200 MG/1
200 TABLET ORAL DAILY
COMMUNITY
End: 2025-02-24 | Stop reason: SDUPTHER

## 2025-02-24 RX ORDER — AMIODARONE HYDROCHLORIDE 200 MG/1
200 TABLET ORAL DAILY
Qty: 90 TABLET | Refills: 1 | Status: SHIPPED | OUTPATIENT
Start: 2025-02-24

## 2025-02-24 NOTE — TELEPHONE ENCOUNTER
The patient requested a refill as she only has one tablet left. The patient was still taking the medication incorrectly. The patient was taking a 100 mg tablet of amiodarone BID. She is supposed to take 200 mg QD. I changed the medication in her chart to avoid future confusion.     Electronically signed by Rekha Giang MA on 2/24/2025 at 11:19 AM

## 2025-02-25 RX ORDER — METOPROLOL SUCCINATE 25 MG/1
25 TABLET, EXTENDED RELEASE ORAL DAILY
Qty: 90 TABLET | Refills: 3 | Status: SHIPPED | OUTPATIENT
Start: 2025-02-25

## 2025-02-28 DIAGNOSIS — I48.19 PERSISTENT ATRIAL FIBRILLATION (HCC): ICD-10-CM

## 2025-02-28 DIAGNOSIS — Z79.899 ON AMIODARONE THERAPY: ICD-10-CM

## 2025-02-28 LAB
ALBUMIN: 4.1 G/DL (ref 3.5–5.2)
ALP BLD-CCNC: 94 U/L (ref 35–104)
ALT SERPL-CCNC: 32 U/L (ref 0–32)
AST SERPL-CCNC: 40 U/L (ref 0–31)
BILIRUB SERPL-MCNC: 0.6 MG/DL (ref 0–1.2)
BILIRUBIN DIRECT: <0.2 MG/DL (ref 0–0.3)
BILIRUBIN, INDIRECT: ABNORMAL MG/DL (ref 0–1)
T3 FREE: 1.62 PG/ML (ref 2–4.4)
T4 FREE: 2.2 NG/DL (ref 0.9–1.7)
TOTAL PROTEIN: 7.2 G/DL (ref 6.4–8.3)
TSH SERPL DL<=0.05 MIU/L-ACNC: 1.83 UIU/ML (ref 0.27–4.2)

## 2025-03-17 ENCOUNTER — OFFICE VISIT (OUTPATIENT)
Dept: NON INVASIVE DIAGNOSTICS | Age: 80
End: 2025-03-17

## 2025-03-17 ENCOUNTER — OFFICE VISIT (OUTPATIENT)
Dept: CARDIOLOGY CLINIC | Age: 80
End: 2025-03-17
Payer: MEDICARE

## 2025-03-17 VITALS
HEART RATE: 88 BPM | SYSTOLIC BLOOD PRESSURE: 122 MMHG | DIASTOLIC BLOOD PRESSURE: 74 MMHG | RESPIRATION RATE: 18 BRPM | OXYGEN SATURATION: 98 % | TEMPERATURE: 97.7 F | BODY MASS INDEX: 20.16 KG/M2 | WEIGHT: 133 LBS | HEIGHT: 68 IN

## 2025-03-17 VITALS
HEIGHT: 66 IN | RESPIRATION RATE: 16 BRPM | WEIGHT: 133.4 LBS | HEART RATE: 65 BPM | SYSTOLIC BLOOD PRESSURE: 120 MMHG | DIASTOLIC BLOOD PRESSURE: 60 MMHG | BODY MASS INDEX: 21.44 KG/M2

## 2025-03-17 DIAGNOSIS — Z95.0 S/P PLACEMENT OF CARDIAC PACEMAKER: ICD-10-CM

## 2025-03-17 DIAGNOSIS — I34.0 NONRHEUMATIC MITRAL VALVE REGURGITATION: ICD-10-CM

## 2025-03-17 DIAGNOSIS — I48.19 PERSISTENT ATRIAL FIBRILLATION (HCC): ICD-10-CM

## 2025-03-17 DIAGNOSIS — I34.1 MITRAL VALVE PROLAPSE: ICD-10-CM

## 2025-03-17 DIAGNOSIS — I50.42 CHRONIC COMBINED SYSTOLIC AND DIASTOLIC CONGESTIVE HEART FAILURE (HCC): ICD-10-CM

## 2025-03-17 DIAGNOSIS — I42.9 CARDIOMYOPATHY, UNSPECIFIED TYPE (HCC): Primary | ICD-10-CM

## 2025-03-17 DIAGNOSIS — I48.19 PERSISTENT ATRIAL FIBRILLATION (HCC): Primary | ICD-10-CM

## 2025-03-17 PROCEDURE — 99214 OFFICE O/P EST MOD 30 MIN: CPT | Performed by: INTERNAL MEDICINE

## 2025-03-17 PROCEDURE — 1159F MED LIST DOCD IN RCRD: CPT | Performed by: INTERNAL MEDICINE

## 2025-03-17 PROCEDURE — 1123F ACP DISCUSS/DSCN MKR DOCD: CPT | Performed by: INTERNAL MEDICINE

## 2025-03-17 RX ORDER — MULTIVITAMIN
1 CAPSULE ORAL DAILY
COMMUNITY

## 2025-03-17 NOTE — PROGRESS NOTES
ms  Episodes: AF burden: 33.2%? undersensing - AF episode since 1/2025 w/ controlled Vrates  Overall device function is normal    All device programmable settings were evaluated per above and in the scanned document, along with iterative adjustments (capture thresholds) to assess and select the most appropriate final programming to provide for consistent delivery of the appropriate therapy and to verify function of the device.      Assessment and plan:    1. Persistent atrial fibrillation on amiodarone  - Diagnosed with new onset AF of unknown duration in April 2022.   - [id number] of 5  - Underwent XANDER and successful DC-CV on 6/28/22 with ERAF.   - On Toprol XL for rate control.  - On Eliquis for stroke risk reduction.  - Amiodarone started 10/2022 and Successful DCCV 11/8/22 Amiodarone discontinued after pacemaker implantation on 3/3/23.   - Cardioverted again on 4/19/2023-was on flecainide 50 mg twice daily.    - Presented to the ED in May 2024 with noted AF, had  WCT with AFL and pacing.  Flecainide was stopped and amiodarone resumed, she did not tolerate 200 mg daily and changed to 100 mg daily and tolerating this dose.    - She was noted to be in AF in January 2025 and Amiodarone dose was increased back to 200 mg daily. She deferred DC-cardioversion at that time.  - Presents in AF with CVR. Denies missing Eliquis dose for the past 3 to 4 weeks.  - I explained the risks & benefits of a CV to the patient. These include but are not limited to sedation, allergy, aspiration, respiratory distress/arrest, burning of skin, bradycardia required pacemaker, stroke and death. The patient verbalizes understanding and agrees to proceed with the procedure.   - Re-education on importance of well controlled HTN (goal BP < 130/80), adequate weight control (goal BMI of < 27), physical activity consisting of moderate cardiopulmonary exercise up to a goal of 250 min/wk, daily compliance with CPAP in treating sleep apnea, smoking

## 2025-03-17 NOTE — PROGRESS NOTES
OUTPATIENT CARDIOLOGY FOLLOW-UP    Name: Queta Queen    Age: 80 y.o.    Primary Care Physician: Destiny Cox MD    Date of Service: 3/17/2025    Chief Complaint:   Chief Complaint   Patient presents with    Cardiomyopathy    Atrial Fibrillation        Interim History:   Here for follow-up regarding atrial fibrillation and cardiomyopathy.  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  Never had any neurologic deficits.  She was found to be in new onset of atrial fibrillation of unknown duration to which the stroke was attributed.  Echo showed EF of 35 to 40%.      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  Cardioversion was initially successful and she went back into atrial fibrillation after 5 minutes.  Subsequent cardiac MRI confirmed moderate MR and I referred her to EP.  Rhythm control was initially achieved with amiodarone, she underwent pacemaker implantation 3/2023 and was subsequently changed to flecainide required another cardioversion.    2024 she was having increased AF burden and flecainide was increased.  She then presented in May 2024 with wide-complex tachycardia suspected VT treated with IV lidocaine.  Underwent heart cath showed mild CAD.  Echo showed normal EF and moderate MR.    Noted to have increased AF burden since 1/2025 now on higher dose of amiodarone and plans for cardioversion.  Saw EP today.  Scheduled for cardioversion.  Feels more fatigued.  Gets a little more short of breath exertion but stays active walking and exercising.  Denies chest pain or edema.    Review of Systems:   Negative except as scribed above    Past Medical History:  Past Medical History:   Diagnosis Date    Arthritis     Atrial fibrillation (HCC) 04/25/2022    Cardiomyopathy (HCC)     new onset    Hyperlipidemia

## 2025-03-19 ENCOUNTER — TELEPHONE (OUTPATIENT)
Dept: CARDIOLOGY | Age: 80
End: 2025-03-19

## 2025-03-19 ENCOUNTER — TELEPHONE (OUTPATIENT)
Dept: NON INVASIVE DIAGNOSTICS | Age: 80
End: 2025-03-19

## 2025-03-19 ENCOUNTER — PREP FOR PROCEDURE (OUTPATIENT)
Dept: NON INVASIVE DIAGNOSTICS | Age: 80
End: 2025-03-19

## 2025-03-19 RX ORDER — SODIUM CHLORIDE 9 MG/ML
INJECTION, SOLUTION INTRAVENOUS PRN
Status: CANCELLED | OUTPATIENT
Start: 2025-03-19

## 2025-03-19 RX ORDER — SODIUM CHLORIDE 0.9 % (FLUSH) 0.9 %
5-40 SYRINGE (ML) INJECTION PRN
Status: CANCELLED | OUTPATIENT
Start: 2025-03-19

## 2025-03-19 RX ORDER — SODIUM CHLORIDE 0.9 % (FLUSH) 0.9 %
5-40 SYRINGE (ML) INJECTION EVERY 12 HOURS SCHEDULED
Status: CANCELLED | OUTPATIENT
Start: 2025-03-19

## 2025-03-19 NOTE — TELEPHONE ENCOUNTER
The patient needs scheduled for a one week remote check s/p Dc CV scheduled on 3/27/2025. Message sent to device clinic.     Electronically signed by Rekha Giang MA on 8/28/2024 at 11:13 AM

## 2025-03-19 NOTE — TELEPHONE ENCOUNTER
Called pt and left message to schedule echo.    Electronically signed by Smita Ellis on 3/19/2025 at 9:44 AM

## 2025-03-21 ENCOUNTER — TELEPHONE (OUTPATIENT)
Dept: NON INVASIVE DIAGNOSTICS | Age: 80
End: 2025-03-21

## 2025-03-26 ENCOUNTER — ANESTHESIA EVENT (OUTPATIENT)
Age: 80
End: 2025-03-26
Payer: MEDICARE

## 2025-03-26 ENCOUNTER — TELEPHONE (OUTPATIENT)
Age: 80
End: 2025-03-26

## 2025-03-27 ENCOUNTER — HOSPITAL ENCOUNTER (OUTPATIENT)
Age: 80
Discharge: HOME | End: 2025-03-29
Attending: INTERNAL MEDICINE
Payer: MEDICARE

## 2025-03-27 ENCOUNTER — ANESTHESIA (OUTPATIENT)
Age: 80
End: 2025-03-27
Payer: MEDICARE

## 2025-03-27 VITALS
RESPIRATION RATE: 16 BRPM | HEART RATE: 70 BPM | TEMPERATURE: 97.8 F | DIASTOLIC BLOOD PRESSURE: 76 MMHG | HEIGHT: 68 IN | WEIGHT: 129 LBS | BODY MASS INDEX: 19.55 KG/M2 | OXYGEN SATURATION: 96 % | SYSTOLIC BLOOD PRESSURE: 129 MMHG

## 2025-03-27 DIAGNOSIS — I48.0 PAROXYSMAL ATRIAL FIBRILLATION (HCC): ICD-10-CM

## 2025-03-27 LAB
ALBUMIN SERPL-MCNC: 4.2 G/DL (ref 3.5–5.2)
ALP SERPL-CCNC: 96 U/L (ref 35–104)
ALT SERPL-CCNC: 29 U/L (ref 0–32)
ANION GAP SERPL CALCULATED.3IONS-SCNC: 12 MMOL/L (ref 7–16)
AST SERPL-CCNC: 31 U/L (ref 0–31)
BILIRUB SERPL-MCNC: 0.5 MG/DL (ref 0–1.2)
BUN SERPL-MCNC: 21 MG/DL (ref 6–23)
CALCIUM SERPL-MCNC: 9.5 MG/DL (ref 8.6–10.2)
CHLORIDE SERPL-SCNC: 102 MMOL/L (ref 98–107)
CO2 SERPL-SCNC: 26 MMOL/L (ref 22–29)
CREAT SERPL-MCNC: 1.1 MG/DL (ref 0.5–1)
ECHO BSA: 1.68 M2
ERYTHROCYTE [DISTWIDTH] IN BLOOD BY AUTOMATED COUNT: 13.2 % (ref 11.5–15)
GFR, ESTIMATED: 48 ML/MIN/1.73M2
GLUCOSE SERPL-MCNC: 92 MG/DL (ref 74–99)
HCT VFR BLD AUTO: 46.7 % (ref 34–48)
HGB BLD-MCNC: 15.4 G/DL (ref 11.5–15.5)
MAGNESIUM SERPL-MCNC: 2.3 MG/DL (ref 1.6–2.6)
MCH RBC QN AUTO: 32.4 PG (ref 26–35)
MCHC RBC AUTO-ENTMCNC: 33 G/DL (ref 32–34.5)
MCV RBC AUTO: 98.3 FL (ref 80–99.9)
PLATELET # BLD AUTO: 260 K/UL (ref 130–450)
PMV BLD AUTO: 10.1 FL (ref 7–12)
POTASSIUM SERPL-SCNC: 4.5 MMOL/L (ref 3.5–5)
PROT SERPL-MCNC: 7.5 G/DL (ref 6.4–8.3)
RBC # BLD AUTO: 4.75 M/UL (ref 3.5–5.5)
SODIUM SERPL-SCNC: 140 MMOL/L (ref 132–146)
WBC OTHER # BLD: 5.8 K/UL (ref 4.5–11.5)

## 2025-03-27 PROCEDURE — 6360000002 HC RX W HCPCS

## 2025-03-27 PROCEDURE — 93005 ELECTROCARDIOGRAM TRACING: CPT | Performed by: INTERNAL MEDICINE

## 2025-03-27 PROCEDURE — 92960 CARDIOVERSION ELECTRIC EXT: CPT

## 2025-03-27 PROCEDURE — 3700000000 HC ANESTHESIA ATTENDED CARE

## 2025-03-27 PROCEDURE — 85027 COMPLETE CBC AUTOMATED: CPT

## 2025-03-27 PROCEDURE — 7100000010 HC PHASE II RECOVERY - FIRST 15 MIN

## 2025-03-27 PROCEDURE — 2580000003 HC RX 258: Performed by: INTERNAL MEDICINE

## 2025-03-27 PROCEDURE — 3700000001 HC ADD 15 MINUTES (ANESTHESIA)

## 2025-03-27 PROCEDURE — 80053 COMPREHEN METABOLIC PANEL: CPT

## 2025-03-27 PROCEDURE — 7100000011 HC PHASE II RECOVERY - ADDTL 15 MIN

## 2025-03-27 PROCEDURE — 83735 ASSAY OF MAGNESIUM: CPT

## 2025-03-27 RX ORDER — SODIUM CHLORIDE 0.9 % (FLUSH) 0.9 %
5-40 SYRINGE (ML) INJECTION EVERY 12 HOURS SCHEDULED
Status: CANCELLED | OUTPATIENT
Start: 2025-03-27

## 2025-03-27 RX ORDER — SODIUM CHLORIDE 9 MG/ML
INJECTION, SOLUTION INTRAVENOUS PRN
Status: DISCONTINUED | OUTPATIENT
Start: 2025-03-27 | End: 2025-03-30 | Stop reason: HOSPADM

## 2025-03-27 RX ORDER — SODIUM CHLORIDE 9 MG/ML
INJECTION, SOLUTION INTRAVENOUS PRN
Status: CANCELLED | OUTPATIENT
Start: 2025-03-27

## 2025-03-27 RX ORDER — SODIUM CHLORIDE 0.9 % (FLUSH) 0.9 %
5-40 SYRINGE (ML) INJECTION EVERY 12 HOURS SCHEDULED
Status: DISCONTINUED | OUTPATIENT
Start: 2025-03-27 | End: 2025-03-30 | Stop reason: HOSPADM

## 2025-03-27 RX ORDER — SODIUM CHLORIDE 0.9 % (FLUSH) 0.9 %
5-40 SYRINGE (ML) INJECTION PRN
Status: DISCONTINUED | OUTPATIENT
Start: 2025-03-27 | End: 2025-03-30 | Stop reason: HOSPADM

## 2025-03-27 RX ORDER — PROPOFOL 10 MG/ML
INJECTION, EMULSION INTRAVENOUS
Status: DISCONTINUED | OUTPATIENT
Start: 2025-03-27 | End: 2025-03-27 | Stop reason: SDUPTHER

## 2025-03-27 RX ORDER — SODIUM CHLORIDE 0.9 % (FLUSH) 0.9 %
5-40 SYRINGE (ML) INJECTION PRN
Status: CANCELLED | OUTPATIENT
Start: 2025-03-27

## 2025-03-27 RX ADMIN — PROPOFOL 50 MG: 10 INJECTION, EMULSION INTRAVENOUS at 11:53

## 2025-03-27 RX ADMIN — SODIUM CHLORIDE: 9 INJECTION, SOLUTION INTRAVENOUS at 11:46

## 2025-03-27 ASSESSMENT — LIFESTYLE VARIABLES: SMOKING_STATUS: 0

## 2025-03-27 NOTE — H&P
[facility]  Outpatient Progress Note  Queta Queen  1945  Date of Service: 3/27/2025  PCP: Destiny Cox MD  Electrophysiologist: Armando Connolly MD    Subjective: Queta Queen is seen for follow-up and management of pacemaker in situ and atrial fibrillation. The patient was last seen in September 2024. The patient has significant past medical history of persistent atrial fibrillation and nonischemic cardiomyopathy. In April of 2022, she was found to have right MCA stroke and new onset AF of unknown duration. Echocardiogram on 4/26/22 showed LV EF of 35-40%, mild bileaflet mitral valve prolapse with mild MR and severe LAE. She had NM stress test 6/16/22 was negative and LV EF of 51%. She underwent XANDER and DC-CV on 6/28/22. 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 DCCV but developed ERAF. Cardiac MRI showed moderate MR and LV EF of 42%. She was seen on 10/21/22 and was placed on Amiodarone. She had repeat DC-CV on 11/18/22 with Dr. Zhong. She had bradycardia in the 40's, decreased Toprol to 12.5 mg daily. Follow up EKG on 11/29/22 showed sinus bradycardia. Echocardiogram on 2/1/23 showed LV EF 50-55%. She underwent pacemaker on 3/3/23.  She then underwent cardioversion on 4/19/23 and was started on Flecainide 100 mg twice daily.  She did not tolerate this high dose and felt palpitations, was decreased to 50 mg twice daily. She felt good and was tolerating this well. In late May [id number], she presented to the ED with WCT in and flecainide was stopped. LHC done and no CAD. She was back on Amiodarone, was on 200 mg daily but had some side effects and decreased to 100 mg daily. She was last seen in September 2024 with COBY Gibson. Since last office visit she was noted to be in AF in January 2025 and Amiodarone dose was increased back to 200 mg daily. She deferred

## 2025-03-27 NOTE — ANESTHESIA PRE PROCEDURE
Department of Anesthesiology  Preprocedure Note       Name:  Queta Queen   Age:  80 y.o.  :  1945                                          MRN:  [id number]         Date:  3/27/2025      Surgeon: SASHA    Procedure: EXTERNAL CARDIOVERSION    Medications prior to admission:   Prior to Admission medications    Medication Sig Start Date End Date Taking? Authorizing Provider   Multiple Vitamin (MULTIVITAMIN) capsule Take 1 capsule by mouth daily   Yes Ijeoma Wills MD   metoprolol succinate (TOPROL XL) 25 MG extended release tablet Take 1 tablet by mouth daily 25  Yes Albania Meier APRN - CNP   amiodarone (CORDARONE) 200 MG tablet Take 1 tablet by mouth daily 25  Yes [place] Meier APRN - CNP   apixaban (ELIQUIS) 2.5 MG TABS tablet Take by mouth 2 times daily   Yes Ijeoma Wills MD   levothyroxine (SYNTHROID) 100 MCG tablet take 1 tablet by mouth once daily 10/26/22  Yes Ijeoma Wills MD   pravastatin (PRAVACHOL) 20 MG tablet take 1 tablet by mouth once daily 10/4/22  Yes Ijeoma Wills MD   fluocinonide (LIDEX) 0.05 % cream Apply topically prn 24   Ijeoma Wills MD   Docusate Sodium (COLACE PO) Take by mouth as needed    Ijeoma Wills MD   Multiple Vitamins-Minerals (THERAPEUTIC MULTIVITAMIN-MINERALS) tablet Take 1 tablet by mouth daily    Ijeoma Wills MD       Current medications:    Current Outpatient Medications   Medication Sig Dispense Refill    Multiple Vitamin (MULTIVITAMIN) capsule Take 1 capsule by mouth daily      metoprolol succinate (TOPROL XL) 25 MG extended release tablet Take 1 tablet by mouth daily 90 tablet 3    amiodarone (CORDARONE) 200 MG tablet Take 1 tablet by mouth daily 90 tablet 1    apixaban (ELIQUIS) 2.5 MG TABS tablet Take by mouth 2 times daily      levothyroxine (SYNTHROID) 100 MCG tablet take 1 tablet by mouth once daily      pravastatin (PRAVACHOL) 20 MG tablet take 1 tablet by mouth once daily

## 2025-03-27 NOTE — ANESTHESIA POSTPROCEDURE EVALUATION
[facility]  [facility]    Patient: Queta Queen  MRN: [id number]  YOB: 1945  Date of evaluation: 3/27/2025    Procedure Summary       Date: 03/27/25 Room / Location: [facility] Cardiac Cath Lab    Anesthesia Start: 1047 Anesthesia Stop: 1158    Procedure: CARDIOVERSION EXTERNAL Diagnosis: Paroxysmal atrial fibrillation (HCC)    Scheduled Providers: Edgar Dow DO Responsible Provider: Edgar Dow DO    Anesthesia Type: MAC ASA Status: 3            Anesthesia Type: MAC    Home Phase I:      Home Phase II:      Anesthesia Post Evaluation    Patient location during evaluation: PACU  Patient participation: complete - patient participated  Level of consciousness: awake and awake and alert  Pain score: 0  Airway patency: patent  Nausea & Vomiting: no nausea and no vomiting  Cardiovascular status: blood pressure returned to baseline and hemodynamically stable  Respiratory status: acceptable, nonlabored ventilation and spontaneous ventilation  Hydration status: euvolemic  Pain management: adequate and satisfactory to patient        No notable events documented.

## 2025-03-27 NOTE — DISCHARGE INSTRUCTIONS
[facility]Electrophysiology  Post DC-Cardioversion Patient Discharge Instructions    1. No driving the day of the procedure.    2. Please resume your medications as instructed.    3. Please call the office at [phone] to schedule a follow-up appointment in 1 week for remote interrogation and in 1 month with provider.

## 2025-03-28 LAB
EKG ATRIAL RATE: 70 BPM
EKG ATRIAL RATE: 72 BPM
EKG P-R INTERVAL: 294 MS
EKG Q-T INTERVAL: 452 MS
EKG Q-T INTERVAL: 466 MS
EKG QRS DURATION: 142 MS
EKG QRS DURATION: 142 MS
EKG QTC CALCULATION (BAZETT): 488 MS
EKG QTC CALCULATION (BAZETT): 506 MS
EKG R AXIS: -43 DEGREES
EKG R AXIS: -46 DEGREES
EKG T AXIS: -161 DEGREES
EKG T AXIS: 38 DEGREES
EKG VENTRICULAR RATE: 70 BPM
EKG VENTRICULAR RATE: 71 BPM

## 2025-04-03 ENCOUNTER — LAB (OUTPATIENT)
Dept: NON INVASIVE DIAGNOSTICS | Age: 80
End: 2025-04-03
Payer: MEDICARE

## 2025-04-03 VITALS
HEART RATE: 70 BPM | SYSTOLIC BLOOD PRESSURE: 128 MMHG | RESPIRATION RATE: 16 BRPM | TEMPERATURE: 97.5 F | DIASTOLIC BLOOD PRESSURE: 62 MMHG | OXYGEN SATURATION: 93 %

## 2025-04-03 DIAGNOSIS — I48.19 PERSISTENT ATRIAL FIBRILLATION (HCC): Primary | ICD-10-CM

## 2025-04-03 PROCEDURE — 93000 ELECTROCARDIOGRAM COMPLETE: CPT | Performed by: INTERNAL MEDICINE

## 2025-04-07 ENCOUNTER — OFFICE VISIT (OUTPATIENT)
Dept: NON INVASIVE DIAGNOSTICS | Age: 80
End: 2025-04-07
Payer: MEDICARE

## 2025-04-07 VITALS
BODY MASS INDEX: 20.12 KG/M2 | DIASTOLIC BLOOD PRESSURE: 80 MMHG | RESPIRATION RATE: 16 BRPM | OXYGEN SATURATION: 91 % | HEART RATE: 72 BPM | TEMPERATURE: 97.3 F | HEIGHT: 67 IN | WEIGHT: 128.2 LBS | SYSTOLIC BLOOD PRESSURE: 148 MMHG

## 2025-04-07 DIAGNOSIS — I48.19 PERSISTENT ATRIAL FIBRILLATION (HCC): Primary | ICD-10-CM

## 2025-04-07 DIAGNOSIS — Z79.899 ON AMIODARONE THERAPY: ICD-10-CM

## 2025-04-07 DIAGNOSIS — Z79.01 ANTICOAGULATED BY ANTICOAGULATION TREATMENT: ICD-10-CM

## 2025-04-07 DIAGNOSIS — Z95.0 CARDIAC PACEMAKER IN SITU: ICD-10-CM

## 2025-04-07 PROCEDURE — 1159F MED LIST DOCD IN RCRD: CPT | Performed by: NURSE PRACTITIONER

## 2025-04-07 PROCEDURE — 1123F ACP DISCUSS/DSCN MKR DOCD: CPT | Performed by: NURSE PRACTITIONER

## 2025-04-07 PROCEDURE — 99215 OFFICE O/P EST HI 40 MIN: CPT | Performed by: NURSE PRACTITIONER

## 2025-04-07 PROCEDURE — 93000 ELECTROCARDIOGRAM COMPLETE: CPT | Performed by: INTERNAL MEDICINE

## 2025-04-07 NOTE — PROGRESS NOTES
of care with the other providers.      Thank you for allowing me to participate in your patient's care.  Please call me if there are any questions or concerns.        Al[place]ia Meier, APRN - CNP  Cardiac Electrophysiology  [facility]  [facility] [place] [place] [facility]  12:58 PM  4/7/2025

## 2025-05-07 ENCOUNTER — TELEPHONE (OUTPATIENT)
Dept: NON INVASIVE DIAGNOSTICS | Age: 80
End: 2025-05-07

## 2025-05-07 NOTE — TELEPHONE ENCOUNTER
I returned the patient's VM about her lab results and amiodarone. I left her a message with the below information per [place].     \"[place] Meier, APRN - Rekha Tobias MA  She should continue amiodarone.  Her liver enzymes are fine.  Her kidney number is mildly elevated, she can discuss with her PCP about this but amiodarone does not affect the kidneys.\"    Electronically signed by Rekha Giang MA on 5/7/2025 at 2:50 PM

## 2025-06-17 ENCOUNTER — HOSPITAL ENCOUNTER (OUTPATIENT)
Dept: CARDIOLOGY | Age: 80
Discharge: HOME | End: 2025-06-19
Attending: INTERNAL MEDICINE
Payer: MEDICARE

## 2025-06-17 VITALS
SYSTOLIC BLOOD PRESSURE: 122 MMHG | HEIGHT: 67 IN | WEIGHT: 124 LBS | BODY MASS INDEX: 19.46 KG/M2 | DIASTOLIC BLOOD PRESSURE: 74 MMHG

## 2025-06-17 DIAGNOSIS — I42.9 CARDIOMYOPATHY, UNSPECIFIED TYPE (HCC): ICD-10-CM

## 2025-06-17 LAB
ECHO AO ASC DIAM: 3 CM
ECHO AO ASCENDING AORTA INDEX: 1.82 CM/M2
ECHO AR MAX VEL PISA: 3.3 M/S
ECHO AV AREA PEAK VELOCITY: 2.9 CM2
ECHO AV AREA VTI: 3.1 CM2
ECHO AV AREA/BSA PEAK VELOCITY: 1.8 CM2/M2
ECHO AV AREA/BSA VTI: 1.9 CM2/M2
ECHO AV CUSP MM: 1.6 CM
ECHO AV MEAN GRADIENT: 2 MMHG
ECHO AV MEAN VELOCITY: 0.6 M/S
ECHO AV PEAK GRADIENT: 5 MMHG
ECHO AV PEAK VELOCITY: 1.2 M/S
ECHO AV REGURGITANT PHT: 564 MS
ECHO AV VELOCITY RATIO: 0.75
ECHO AV VTI: 19 CM
ECHO BSA: 1.63 M2
ECHO EST RA PRESSURE: 3 MMHG
ECHO LA DIAMETER INDEX: 2.3 CM/M2
ECHO LA DIAMETER: 3.8 CM
ECHO LA VOL A-L A2C: 118 ML (ref 22–52)
ECHO LA VOL A-L A4C: 104 ML (ref 22–52)
ECHO LA VOL MOD A2C: 115 ML (ref 22–52)
ECHO LA VOL MOD A4C: 95 ML (ref 22–52)
ECHO LA VOLUME AREA LENGTH: 116 ML
ECHO LA VOLUME INDEX A-L A2C: 72 ML/M2 (ref 16–34)
ECHO LA VOLUME INDEX A-L A4C: 63 ML/M2 (ref 16–34)
ECHO LA VOLUME INDEX AREA LENGTH: 70 ML/M2 (ref 16–34)
ECHO LA VOLUME INDEX MOD A2C: 70 ML/M2 (ref 16–34)
ECHO LA VOLUME INDEX MOD A4C: 58 ML/M2 (ref 16–34)
ECHO LV EDV A2C: 72 ML
ECHO LV EDV A4C: 52 ML
ECHO LV EDV BP: 64 ML (ref 56–104)
ECHO LV EDV INDEX A4C: 32 ML/M2
ECHO LV EDV INDEX BP: 39 ML/M2
ECHO LV EDV NDEX A2C: 44 ML/M2
ECHO LV EJECTION FRACTION A2C: 38 %
ECHO LV EJECTION FRACTION A4C: 44 %
ECHO LV EJECTION FRACTION BIPLANE: 39 % (ref 55–100)
ECHO LV ESV A2C: 45 ML
ECHO LV ESV A4C: 29 ML
ECHO LV ESV BP: 39 ML (ref 19–49)
ECHO LV ESV INDEX A2C: 27 ML/M2
ECHO LV ESV INDEX A4C: 18 ML/M2
ECHO LV ESV INDEX BP: 24 ML/M2
ECHO LV FRACTIONAL SHORTENING: 22 % (ref 28–44)
ECHO LV INTERNAL DIMENSION DIASTOLE INDEX: 2.97 CM/M2
ECHO LV INTERNAL DIMENSION DIASTOLIC: 4.9 CM (ref 3.9–5.3)
ECHO LV INTERNAL DIMENSION SYSTOLIC INDEX: 2.3 CM/M2
ECHO LV INTERNAL DIMENSION SYSTOLIC: 3.8 CM
ECHO LV IVSD: 1 CM (ref 0.6–0.9)
ECHO LV MASS 2D: 176 G (ref 67–162)
ECHO LV MASS INDEX 2D: 106.7 G/M2 (ref 43–95)
ECHO LV POSTERIOR WALL DIASTOLIC: 1 CM (ref 0.6–0.9)
ECHO LV RELATIVE WALL THICKNESS RATIO: 0.41
ECHO LVOT AREA: 3.8 CM2
ECHO LVOT AV VTI INDEX: 0.82
ECHO LVOT DIAM: 2.2 CM
ECHO LVOT MEAN GRADIENT: 1 MMHG
ECHO LVOT PEAK GRADIENT: 3 MMHG
ECHO LVOT PEAK VELOCITY: 0.9 M/S
ECHO LVOT STROKE VOLUME INDEX: 35.9 ML/M2
ECHO LVOT SV: 59.3 ML
ECHO LVOT VTI: 15.6 CM
ECHO MV A VELOCITY: 0.51 M/S
ECHO MV AREA VTI: 2 CM2
ECHO MV E DECELERATION TIME (DT): 142.1 MS
ECHO MV E VELOCITY: 1.54 M/S
ECHO MV E/A RATIO: 3.02
ECHO MV EROA PISA: 0.3 CM2
ECHO MV LVOT VTI INDEX: 1.89
ECHO MV MAX VELOCITY: 1.6 M/S
ECHO MV MEAN GRADIENT: 3 MMHG
ECHO MV MEAN VELOCITY: 0.7 M/S
ECHO MV PEAK GRADIENT: 10 MMHG
ECHO MV REGURGITANT ALIASING (NYQUIST) VELOCITY: 25 CM/S
ECHO MV REGURGITANT RADIUS PISA: 0.96 CM
ECHO MV REGURGITANT VELOCITY PISA: 5.5 M/S
ECHO MV REGURGITANT VOLUME PISA: 52.48 ML
ECHO MV REGURGITANT VTIA: 199.5 CM
ECHO MV VTI: 29.5 CM
ECHO RIGHT VENTRICULAR SYSTOLIC PRESSURE (RVSP): 35 MMHG
ECHO RV INTERNAL DIMENSION: 2 CM
ECHO TV REGURGITANT MAX VELOCITY: 2.83 M/S
ECHO TV REGURGITANT PEAK GRADIENT: 32 MMHG

## 2025-06-17 PROCEDURE — 93306 TTE W/DOPPLER COMPLETE: CPT

## 2025-07-01 LAB
ECHO AO ASC DIAM: 3 CM
ECHO AO ASCENDING AORTA INDEX: 1.82 CM/M2
ECHO AR MAX VEL PISA: 3.3 M/S
ECHO AV AREA PEAK VELOCITY: 2.9 CM2
ECHO AV AREA VTI: 3.1 CM2
ECHO AV AREA/BSA PEAK VELOCITY: 1.8 CM2/M2
ECHO AV AREA/BSA VTI: 1.9 CM2/M2
ECHO AV CUSP MM: 1.6 CM
ECHO AV MEAN GRADIENT: 2 MMHG
ECHO AV MEAN VELOCITY: 0.6 M/S
ECHO AV PEAK GRADIENT: 5 MMHG
ECHO AV PEAK VELOCITY: 1.2 M/S
ECHO AV REGURGITANT PHT: 564 MS
ECHO AV VELOCITY RATIO: 0.75
ECHO AV VTI: 19 CM
ECHO BSA: 1.63 M2
ECHO EST RA PRESSURE: 3 MMHG
ECHO LA DIAMETER INDEX: 2.3 CM/M2
ECHO LA DIAMETER: 3.8 CM
ECHO LA VOL A-L A2C: 118 ML (ref 22–52)
ECHO LA VOL A-L A4C: 104 ML (ref 22–52)
ECHO LA VOL MOD A2C: 115 ML (ref 22–52)
ECHO LA VOL MOD A4C: 95 ML (ref 22–52)
ECHO LA VOLUME AREA LENGTH: 116 ML
ECHO LA VOLUME INDEX A-L A2C: 72 ML/M2 (ref 16–34)
ECHO LA VOLUME INDEX A-L A4C: 63 ML/M2 (ref 16–34)
ECHO LA VOLUME INDEX AREA LENGTH: 70 ML/M2 (ref 16–34)
ECHO LA VOLUME INDEX MOD A2C: 70 ML/M2 (ref 16–34)
ECHO LA VOLUME INDEX MOD A4C: 58 ML/M2 (ref 16–34)
ECHO LV EDV A2C: 72 ML
ECHO LV EDV A4C: 52 ML
ECHO LV EDV BP: 64 ML (ref 56–104)
ECHO LV EDV INDEX A4C: 32 ML/M2
ECHO LV EDV INDEX BP: 39 ML/M2
ECHO LV EDV NDEX A2C: 44 ML/M2
ECHO LV EF PHYSICIAN: 40 %
ECHO LV EJECTION FRACTION A2C: 38 %
ECHO LV EJECTION FRACTION A4C: 44 %
ECHO LV EJECTION FRACTION BIPLANE: 39 % (ref 55–100)
ECHO LV ESV A2C: 45 ML
ECHO LV ESV A4C: 29 ML
ECHO LV ESV BP: 39 ML (ref 19–49)
ECHO LV ESV INDEX A2C: 27 ML/M2
ECHO LV ESV INDEX A4C: 18 ML/M2
ECHO LV ESV INDEX BP: 24 ML/M2
ECHO LV FRACTIONAL SHORTENING: 22 % (ref 28–44)
ECHO LV INTERNAL DIMENSION DIASTOLE INDEX: 2.97 CM/M2
ECHO LV INTERNAL DIMENSION DIASTOLIC: 4.9 CM (ref 3.9–5.3)
ECHO LV INTERNAL DIMENSION SYSTOLIC INDEX: 2.3 CM/M2
ECHO LV INTERNAL DIMENSION SYSTOLIC: 3.8 CM
ECHO LV IVSD: 1 CM (ref 0.6–0.9)
ECHO LV MASS 2D: 176 G (ref 67–162)
ECHO LV MASS INDEX 2D: 106.7 G/M2 (ref 43–95)
ECHO LV POSTERIOR WALL DIASTOLIC: 1 CM (ref 0.6–0.9)
ECHO LV RELATIVE WALL THICKNESS RATIO: 0.41
ECHO LVOT AREA: 3.8 CM2
ECHO LVOT AV VTI INDEX: 0.82
ECHO LVOT DIAM: 2.2 CM
ECHO LVOT MEAN GRADIENT: 1 MMHG
ECHO LVOT PEAK GRADIENT: 3 MMHG
ECHO LVOT PEAK VELOCITY: 0.9 M/S
ECHO LVOT STROKE VOLUME INDEX: 35.9 ML/M2
ECHO LVOT SV: 59.3 ML
ECHO LVOT VTI: 15.6 CM
ECHO MV A VELOCITY: 0.51 M/S
ECHO MV AREA VTI: 2 CM2
ECHO MV E DECELERATION TIME (DT): 142.1 MS
ECHO MV E VELOCITY: 1.54 M/S
ECHO MV E/A RATIO: 3.02
ECHO MV EROA PISA: 0.3 CM2
ECHO MV LVOT VTI INDEX: 1.89
ECHO MV MAX VELOCITY: 1.6 M/S
ECHO MV MEAN GRADIENT: 3 MMHG
ECHO MV MEAN VELOCITY: 0.7 M/S
ECHO MV PEAK GRADIENT: 10 MMHG
ECHO MV REGURGITANT ALIASING (NYQUIST) VELOCITY: 25 CM/S
ECHO MV REGURGITANT RADIUS PISA: 0.96 CM
ECHO MV REGURGITANT VELOCITY PISA: 5.5 M/S
ECHO MV REGURGITANT VOLUME PISA: 52.48 ML
ECHO MV REGURGITANT VTIA: 199.5 CM
ECHO MV VTI: 29.5 CM
ECHO RIGHT VENTRICULAR SYSTOLIC PRESSURE (RVSP): 35 MMHG
ECHO RV INTERNAL DIMENSION: 2 CM
ECHO RV TAPSE: 1.9 CM (ref 1.7–?)
ECHO TV REGURGITANT MAX VELOCITY: 2.83 M/S
ECHO TV REGURGITANT PEAK GRADIENT: 32 MMHG

## 2025-07-28 RX ORDER — AMIODARONE HYDROCHLORIDE 200 MG/1
200 TABLET ORAL DAILY
Qty: 90 TABLET | Refills: 1 | Status: SHIPPED | OUTPATIENT
Start: 2025-07-28

## (undated) DEVICE — TUBING PRSS MON L12IN PVC RIG NONEXPANDING M TO FEM CONN

## (undated) DEVICE — SURGICAL PROCEDURE KIT 3 W

## (undated) DEVICE — GUIDEWIRE VASC L260CM 0.035IN J TIP L3MM PTFE FIX COR NAMIC

## (undated) DEVICE — GLIDESHEATH NITINOL HYDROPHILIC COATED INTRODUCER SHEATH: Brand: GLIDESHEATH

## (undated) DEVICE — PAD, DEFIB, ADULT, RADIOTRAN, PHYSIO, LO: Brand: MEDLINE

## (undated) DEVICE — CANNULA NSL CANN NSL L25FT TBNG AD O2 SUP SFT UC

## (undated) DEVICE — PACK SURG CARDIAC CATH

## (undated) DEVICE — BAND COMPR L21CM SHT CLR PLAS HEMSTAT EXT HK AND LOOP RETEN

## (undated) DEVICE — CATHETER COR DIAG JUDKINS L 3.5 CRV 6FR 100CM 0 SIDE H